# Patient Record
Sex: FEMALE | Race: WHITE | NOT HISPANIC OR LATINO | Employment: OTHER | URBAN - NONMETROPOLITAN AREA
[De-identification: names, ages, dates, MRNs, and addresses within clinical notes are randomized per-mention and may not be internally consistent; named-entity substitution may affect disease eponyms.]

---

## 2019-09-12 ENCOUNTER — CONVERSION ENCOUNTER (OUTPATIENT)
Dept: FAMILY MEDICINE CLINIC | Facility: CLINIC | Age: 64
End: 2019-09-12

## 2019-09-12 ENCOUNTER — OFFICE VISIT CONVERTED (OUTPATIENT)
Dept: FAMILY MEDICINE CLINIC | Facility: CLINIC | Age: 64
End: 2019-09-12
Attending: FAMILY MEDICINE

## 2019-09-24 ENCOUNTER — HOSPITAL ENCOUNTER (OUTPATIENT)
Dept: GENERAL RADIOLOGY | Facility: HOSPITAL | Age: 64
Discharge: HOME | End: 2019-09-24
Attending: FAMILY MEDICINE

## 2019-10-03 ENCOUNTER — CONVERSION ENCOUNTER (OUTPATIENT)
Dept: FAMILY MEDICINE CLINIC | Facility: CLINIC | Age: 64
End: 2019-10-03

## 2019-10-03 ENCOUNTER — HOSPITAL ENCOUNTER (OUTPATIENT)
Dept: GENERAL RADIOLOGY | Facility: HOSPITAL | Age: 64
Discharge: HOME | End: 2019-10-03
Attending: FAMILY MEDICINE

## 2019-10-03 ENCOUNTER — OFFICE VISIT CONVERTED (OUTPATIENT)
Dept: FAMILY MEDICINE CLINIC | Facility: CLINIC | Age: 64
End: 2019-10-03
Attending: FAMILY MEDICINE

## 2019-11-08 ENCOUNTER — OFFICE VISIT CONVERTED (OUTPATIENT)
Dept: FAMILY MEDICINE CLINIC | Facility: CLINIC | Age: 64
End: 2019-11-08
Attending: FAMILY MEDICINE

## 2019-11-11 ENCOUNTER — HOSPITAL ENCOUNTER (OUTPATIENT)
Dept: GENERAL RADIOLOGY | Facility: HOSPITAL | Age: 64
Discharge: HOME | End: 2019-11-11
Attending: FAMILY MEDICINE

## 2020-01-03 ENCOUNTER — OFFICE VISIT CONVERTED (OUTPATIENT)
Dept: FAMILY MEDICINE CLINIC | Facility: CLINIC | Age: 65
End: 2020-01-03
Attending: FAMILY MEDICINE

## 2020-01-03 ENCOUNTER — CONVERSION ENCOUNTER (OUTPATIENT)
Dept: FAMILY MEDICINE CLINIC | Facility: CLINIC | Age: 65
End: 2020-01-03

## 2020-04-27 ENCOUNTER — HOSPITAL ENCOUNTER (OUTPATIENT)
Dept: GENERAL RADIOLOGY | Facility: HOSPITAL | Age: 65
Discharge: HOME | End: 2020-04-27
Attending: FAMILY MEDICINE

## 2020-04-27 LAB
25(OH)D3 SERPL-MCNC: 13.5 NG/ML (ref 30–100)
ALBUMIN SERPL-MCNC: 4.2 G/DL (ref 3.5–5)
ALBUMIN/GLOB SERPL: 1.3 {RATIO} (ref 1.4–2.6)
ALP SERPL-CCNC: 72 U/L (ref 43–160)
ALT SERPL-CCNC: 17 U/L (ref 10–40)
ANION GAP SERPL CALC-SCNC: 22 MMOL/L (ref 8–19)
AST SERPL-CCNC: 25 U/L (ref 15–50)
BASOPHILS # BLD AUTO: 0.04 10*3/UL (ref 0–0.2)
BASOPHILS NFR BLD AUTO: 0.6 % (ref 0–3)
BILIRUB SERPL-MCNC: 0.95 MG/DL (ref 0.2–1.3)
BUN SERPL-MCNC: 19 MG/DL (ref 5–25)
BUN/CREAT SERPL: 22 {RATIO} (ref 6–20)
CALCIUM SERPL-MCNC: 9.3 MG/DL (ref 8.7–10.4)
CHLORIDE SERPL-SCNC: 104 MMOL/L (ref 99–111)
CHOLEST SERPL-MCNC: 170 MG/DL (ref 107–200)
CHOLEST/HDLC SERPL: 2.6 {RATIO} (ref 3–6)
CONV ABS IMM GRAN: 0.02 10*3/UL (ref 0–0.2)
CONV CO2: 20 MMOL/L (ref 22–32)
CONV IMMATURE GRAN: 0.3 % (ref 0–1.8)
CONV TOTAL PROTEIN: 7.4 G/DL (ref 6.3–8.2)
CREAT UR-MCNC: 0.88 MG/DL (ref 0.5–0.9)
DEPRECATED RDW RBC AUTO: 42.7 FL (ref 36.4–46.3)
EOSINOPHIL # BLD AUTO: 0.1 10*3/UL (ref 0–0.7)
EOSINOPHIL # BLD AUTO: 1.4 % (ref 0–7)
ERYTHROCYTE [DISTWIDTH] IN BLOOD BY AUTOMATED COUNT: 12.5 % (ref 11.7–14.4)
EST. AVERAGE GLUCOSE BLD GHB EST-MCNC: 114 MG/DL
GFR SERPLBLD BASED ON 1.73 SQ M-ARVRAT: >60 ML/MIN/{1.73_M2}
GLOBULIN UR ELPH-MCNC: 3.2 G/DL (ref 2–3.5)
GLUCOSE SERPL-MCNC: 104 MG/DL (ref 65–99)
HBA1C MFR BLD: 5.6 % (ref 3.5–5.7)
HCT VFR BLD AUTO: 47 % (ref 37–47)
HDLC SERPL-MCNC: 66 MG/DL (ref 40–60)
HGB BLD-MCNC: 15.5 G/DL (ref 12–16)
LDLC SERPL CALC-MCNC: 83 MG/DL (ref 70–100)
LYMPHOCYTES # BLD AUTO: 1.53 10*3/UL (ref 1–5)
LYMPHOCYTES NFR BLD AUTO: 21.9 % (ref 20–45)
MCH RBC QN AUTO: 30.3 PG (ref 27–31)
MCHC RBC AUTO-ENTMCNC: 33 G/DL (ref 33–37)
MCV RBC AUTO: 92 FL (ref 81–99)
MONOCYTES # BLD AUTO: 0.36 10*3/UL (ref 0.2–1.2)
MONOCYTES NFR BLD AUTO: 5.2 % (ref 3–10)
NEUTROPHILS # BLD AUTO: 4.93 10*3/UL (ref 2–8)
NEUTROPHILS NFR BLD AUTO: 70.6 % (ref 30–85)
NRBC CBCN: 0 % (ref 0–0.7)
OSMOLALITY SERPL CALC.SUM OF ELEC: 295 MOSM/KG (ref 273–304)
PLATELET # BLD AUTO: 201 10*3/UL (ref 130–400)
PMV BLD AUTO: 11.2 FL (ref 9.4–12.3)
POTASSIUM SERPL-SCNC: 4.6 MMOL/L (ref 3.5–5.3)
RBC # BLD AUTO: 5.11 10*6/UL (ref 4.2–5.4)
SODIUM SERPL-SCNC: 141 MMOL/L (ref 135–147)
TRIGL SERPL-MCNC: 104 MG/DL (ref 40–150)
TSH SERPL-ACNC: 1.85 M[IU]/L (ref 0.27–4.2)
VLDLC SERPL-MCNC: 21 MG/DL (ref 5–37)
WBC # BLD AUTO: 6.98 10*3/UL (ref 4.8–10.8)

## 2020-04-29 LAB
CCP IGA+IGG SERPL IA-ACNC: 10 UNITS (ref 0–19)
DSDNA AB SER-ACNC: NEGATIVE [IU]/ML
ENA AB SER IA-ACNC: NEGATIVE {RATIO}

## 2020-04-30 LAB — ENA JO1 IGG SER-ACNC: 0 AU/ML (ref 0–40)

## 2020-05-05 ENCOUNTER — TELEMEDICINE CONVERTED (OUTPATIENT)
Dept: FAMILY MEDICINE CLINIC | Facility: CLINIC | Age: 65
End: 2020-05-05
Attending: FAMILY MEDICINE

## 2021-05-13 NOTE — PROGRESS NOTES
Quick Note      Patient Name: Suyapa Barrera   Patient ID: [id number]   Sex: Female   YOB: 1955    Primary Care Provider: Robin Cavazos DO   Referring Provider: Robin Cavazos DO    Visit Date: May 5, 2020    Provider: Robin Cavazos DO   Location: [facility]   Location Address: [address] [place] [address] [address] [place] [address]  [id number]   Location Phone: [phone]          History Of Present Illness  TELEHEALTH TELEPHONE VISIT  Chief Complaint: FOLLOW UP   Suyapa Barrera is a 65 year old /White female who is presenting for evaluation via telehealth telephone visit. Verbal consent obtained before beginning visit.   Provider spent 12 minutes with the patient during telehealth visit.   The following staff were present during this visit: Robin Cavazos DO   Past Medical History/Overview of Patient Symptoms       Patient with PMH significant for anxiety depression HTN and prediabetes.  She is on aspirin Effexor lisinopril metformin simvastatin last labs were 4/2028 was 13.4 A1c 5.6 CMP cholesterol within normal limits and she had some rheumatology labs are were negative.  Otherwise doing well no complaints today needs refills    Complaining of some mild elbow pain sounds like arthritis or bursitis she stateS    **Attempted video several times           Assessment  · Osteoarthritis     715.90/M19.90  · Anxiety and depression       Anxiety disorder, unspecified     300.00/F41.9  Major depressive disorder, single episode, unspecified     300.00/F32.9  · HLD (hyperlipidemia)     272.4/E78.5  · HTN (hypertension)     401.9/I10  · Prediabetes     790.29/R73.03  · Obesity (BMI 30-39.9)     278.00/E66.9           Depression  *Controlled on meds continue with Effexor    Prediabetes  *Controlled  A1c recently 5.6  Continue with diet exercise    HTN HLD  *Continue lisinopril and simvastatin    Recheck labs annually follow-up 6 to 12 months   RX voltaren gel for elbow pain        Plan  · Medications  o Voltaren 1 % topical gel   SIG: apply 2 grams to the affected area(s) by topical route 4 times per day for 14 days   DISP: (1) 100 gm tube with 5 refills  Prescribed on 05/05/2020     o Medications have been Reconciled  · Instructions  o Plan Of Care:   o Chronic conditions reviewed and taken into consideration for today's treatment plan.  o Patient instructed to seek medical attention urgently for new or worsening symptoms.  o Call the office with any concerns or questions.  o Risks, benefits, and alternatives were discussed with the patient. The patient is aware of risks associated with:  · Disposition  o Call or Return if symptoms worsen or persist.  o Appointment Requested ([id number]) and Recall created ([id number])  Careprovider : Robin Cavazos DO ([id number])  Location : [facility]  Appointment : Follow-up / Office Visit  Date : 6 months +/- 2 days  Override : No  Comments/Instructions : f/u chronic conditions            Electronically Signed by: Robin Cavazos DO -Author on May 5, 2020 09:29:21 AM

## 2021-05-15 VITALS
HEART RATE: 85 BPM | BODY MASS INDEX: 34.85 KG/M2 | DIASTOLIC BLOOD PRESSURE: 74 MMHG | TEMPERATURE: 97.8 F | RESPIRATION RATE: 20 BRPM | WEIGHT: 204.12 LBS | SYSTOLIC BLOOD PRESSURE: 136 MMHG | OXYGEN SATURATION: 98 % | HEIGHT: 64 IN

## 2021-05-15 VITALS
BODY MASS INDEX: 34.55 KG/M2 | OXYGEN SATURATION: 99 % | DIASTOLIC BLOOD PRESSURE: 75 MMHG | WEIGHT: 202.37 LBS | HEIGHT: 64 IN | HEART RATE: 72 BPM | RESPIRATION RATE: 20 BRPM | TEMPERATURE: 97.3 F | SYSTOLIC BLOOD PRESSURE: 149 MMHG

## 2021-05-15 VITALS
BODY MASS INDEX: 34.21 KG/M2 | WEIGHT: 200.37 LBS | SYSTOLIC BLOOD PRESSURE: 140 MMHG | OXYGEN SATURATION: 96 % | DIASTOLIC BLOOD PRESSURE: 75 MMHG | HEART RATE: 91 BPM | HEIGHT: 64 IN | RESPIRATION RATE: 18 BRPM

## 2021-05-15 VITALS
SYSTOLIC BLOOD PRESSURE: 136 MMHG | HEART RATE: 71 BPM | TEMPERATURE: 97.7 F | DIASTOLIC BLOOD PRESSURE: 70 MMHG | BODY MASS INDEX: 34.51 KG/M2 | HEIGHT: 64 IN | OXYGEN SATURATION: 98 % | WEIGHT: 202.12 LBS | RESPIRATION RATE: 20 BRPM

## 2022-08-03 ENCOUNTER — TELEPHONE (OUTPATIENT)
Dept: FAMILY MEDICINE CLINIC | Facility: CLINIC | Age: 67
End: 2022-08-03

## 2022-08-03 DIAGNOSIS — U07.1 COVID-19 VIRUS DETECTED: Primary | ICD-10-CM

## 2022-08-03 PROBLEM — R73.02 IMPAIRED GLUCOSE TOLERANCE: Status: ACTIVE | Noted: 2022-08-03

## 2022-08-03 PROBLEM — G89.29 CHRONIC PAIN: Status: ACTIVE | Noted: 2022-08-03

## 2022-08-03 PROBLEM — G47.33 OSA (OBSTRUCTIVE SLEEP APNEA): Status: ACTIVE | Noted: 2022-08-03

## 2022-08-03 PROBLEM — E55.9 VITAMIN D DEFICIENCY: Status: ACTIVE | Noted: 2022-08-03

## 2022-08-03 PROBLEM — E78.5 HLD (HYPERLIPIDEMIA): Status: ACTIVE | Noted: 2022-08-03

## 2022-08-03 PROBLEM — E66.9 OBESITY (BMI 30-39.9): Status: ACTIVE | Noted: 2022-08-03

## 2022-08-03 PROBLEM — F32.A ANXIETY AND DEPRESSION: Status: ACTIVE | Noted: 2022-08-03

## 2022-08-03 PROBLEM — F41.0 PANIC ATTACKS: Status: ACTIVE | Noted: 2022-08-03

## 2022-08-03 PROBLEM — M51.369 DEGENERATIVE DISC DISEASE, LUMBAR: Status: ACTIVE | Noted: 2022-08-03

## 2022-08-03 PROBLEM — M51.36 DEGENERATIVE DISC DISEASE, LUMBAR: Status: ACTIVE | Noted: 2022-08-03

## 2022-08-03 PROBLEM — K21.9 ESOPHAGEAL REFLUX: Status: ACTIVE | Noted: 2022-08-03

## 2022-08-03 PROBLEM — I10 HTN (HYPERTENSION): Status: ACTIVE | Noted: 2022-08-03

## 2022-08-03 PROBLEM — F41.9 ANXIETY AND DEPRESSION: Status: ACTIVE | Noted: 2022-08-03

## 2022-08-03 RX ORDER — OMEPRAZOLE 40 MG/1
40 CAPSULE, DELAYED RELEASE ORAL DAILY
COMMUNITY
Start: 2022-06-09

## 2022-08-03 RX ORDER — LISINOPRIL 20 MG/1
TABLET ORAL
COMMUNITY
Start: 2022-06-09 | End: 2022-08-04

## 2022-08-03 RX ORDER — VENLAFAXINE HYDROCHLORIDE 75 MG/1
75 CAPSULE, EXTENDED RELEASE ORAL DAILY
COMMUNITY
Start: 2022-06-09

## 2022-08-03 RX ORDER — ASPIRIN 81 MG/1
TABLET, CHEWABLE ORAL
COMMUNITY
End: 2022-08-04

## 2022-08-03 RX ORDER — SIMVASTATIN 40 MG
40 TABLET ORAL NIGHTLY
COMMUNITY
Start: 2022-06-25

## 2022-08-03 NOTE — TELEPHONE ENCOUNTER
Caller: Suyapa Barrera    Relationship: Self    Best call back number: [phone]    What medication are you requesting: PAXLOVID     What are your current symptoms: COVID POSITIVE HOME TEST, EYES BURN, DRY COUGH, VOICE HOARSE    How long have you been experiencing symptoms: 08/02/2022    Have you had these symptoms before:    [] Yes  [x] No    Have you been treated for these symptoms before:   [] Yes  [x] No    If a prescription is needed, what is your preferred pharmacy and phone number: [id number] [address] AT [facility] [address]  [phone] [facility] [phone] FX

## 2022-08-03 NOTE — TELEPHONE ENCOUNTER
Talked to patient, she has not been seen in office since 2020. I advised to do a telehealth since she tested positive for covid at home and she does not want to do that and she does not want to go to urgent care or er as well. She was just asking for medication.

## 2022-08-03 NOTE — TELEPHONE ENCOUNTER
Ok I sent in med to j[facility] and messaged her on [employer] and [employer] that I had sent it.     Will see her tomorrow

## 2022-08-04 ENCOUNTER — OFFICE VISIT (OUTPATIENT)
Dept: FAMILY MEDICINE CLINIC | Facility: CLINIC | Age: 67
End: 2022-08-04

## 2022-08-04 VITALS
DIASTOLIC BLOOD PRESSURE: 73 MMHG | HEIGHT: 64 IN | SYSTOLIC BLOOD PRESSURE: 134 MMHG | OXYGEN SATURATION: 97 % | RESPIRATION RATE: 18 BRPM | TEMPERATURE: 97.8 F | WEIGHT: 198.5 LBS | HEART RATE: 85 BPM | BODY MASS INDEX: 33.89 KG/M2

## 2022-08-04 DIAGNOSIS — S93.609A COMPLETE TEAR OF LIGAMENT OF FOOT: ICD-10-CM

## 2022-08-04 DIAGNOSIS — G89.29 OTHER CHRONIC PAIN: ICD-10-CM

## 2022-08-04 DIAGNOSIS — F41.9 ANXIETY AND DEPRESSION: ICD-10-CM

## 2022-08-04 DIAGNOSIS — Z78.0 POSTMENOPAUSE: ICD-10-CM

## 2022-08-04 DIAGNOSIS — Z12.11 ENCOUNTER FOR SCREENING FOR MALIGNANT NEOPLASM OF COLON: ICD-10-CM

## 2022-08-04 DIAGNOSIS — F32.A ANXIETY AND DEPRESSION: ICD-10-CM

## 2022-08-04 DIAGNOSIS — Z12.31 ENCOUNTER FOR SCREENING MAMMOGRAM FOR MALIGNANT NEOPLASM OF BREAST: ICD-10-CM

## 2022-08-04 DIAGNOSIS — U07.1 COVID-19 VIRUS DETECTED: Primary | ICD-10-CM

## 2022-08-04 PROBLEM — M25.519 SHOULDER JOINT PAIN: Status: ACTIVE | Noted: 2022-08-04

## 2022-08-04 PROBLEM — M25.619 STIFFNESS OF SHOULDER JOINT: Status: ACTIVE | Noted: 2022-08-04

## 2022-08-04 PROBLEM — M75.20 BICIPITAL TENOSYNOVITIS: Status: ACTIVE | Noted: 2022-08-04

## 2022-08-04 PROBLEM — M71.9 DISORDER OF BURSAE OF SHOULDER REGION: Status: ACTIVE | Noted: 2022-08-04

## 2022-08-04 PROBLEM — M75.120 FULL THICKNESS ROTATOR CUFF TEAR: Status: ACTIVE | Noted: 2022-08-04

## 2022-08-04 PROCEDURE — 99213 OFFICE O/P EST LOW 20 MIN: CPT | Performed by: FAMILY MEDICINE

## 2022-08-04 RX ORDER — DICLOFENAC SODIUM 75 MG/1
TABLET, DELAYED RELEASE ORAL
COMMUNITY
End: 2022-08-04

## 2022-08-04 RX ORDER — ERGOCALCIFEROL 1.25 MG/1
CAPSULE ORAL
COMMUNITY
End: 2022-08-04

## 2022-08-04 RX ORDER — HYDROCODONE BITARTRATE AND ACETAMINOPHEN 5; 325 MG/1; MG/1
TABLET ORAL
COMMUNITY
End: 2022-08-04

## 2022-08-04 RX ORDER — TRAMADOL HYDROCHLORIDE 50 MG/1
TABLET ORAL
COMMUNITY
End: 2022-08-04

## 2022-08-04 RX ORDER — LISINOPRIL 30 MG/1
30 TABLET ORAL DAILY
COMMUNITY

## 2022-08-04 NOTE — PROGRESS NOTES
"Chief Complaint  Generalized Body Aches (Symptoms started Tuesday), Cough (At home Covid test positive), Sore Throat, and Fever    Subjective        Suyapa Barrera presents to [facility]  History of Present Illness    Patient presents with body aches sore throat fever symptoms no improvement with over-the-counter medicines    Objective   Vital Signs:  /73 (BP Location: Left arm, Patient Position: Sitting)   Pulse 85   Temp 97.8 °F (36.6 °C) (Infrared)   Resp 18   Ht 162.6 cm (64\")   Wt 90 kg (198 lb 8 oz)   SpO2 97%   BMI 34.07 kg/m²   Estimated body mass index is 34.07 kg/m² as calculated from the following:    Height as of this encounter: 162.6 cm (64\").    Weight as of this encounter: 90 kg (198 lb 8 oz).          Physical Exam  Vitals reviewed.   Constitutional:       Appearance: Normal appearance. She is well-developed.   HENT:      Head: Normocephalic and atraumatic.      Right Ear: External ear normal.      Left Ear: External ear normal.      Nose: Congestion and rhinorrhea present.   Eyes:      Conjunctiva/sclera: Conjunctivae normal.      Pupils: Pupils are equal, round, and reactive to light.   Cardiovascular:      Rate and Rhythm: Normal rate.   Pulmonary:      Effort: Pulmonary effort is normal.      Breath sounds: Normal breath sounds.   Abdominal:      General: There is no distension.   Skin:     General: Skin is warm and dry.   Neurological:      General: No focal deficit present.      Mental Status: She is alert and oriented to person, place, and time.   Psychiatric:         Mood and Affect: Mood and affect normal.         Behavior: Behavior normal.         Thought Content: Thought content normal.         Judgment: Judgment normal.        Result Review :  The following data was reviewed by: Robin Cavazos DO on 08/04/2022:                Assessment and Plan   Diagnoses and all orders for this visit:    1. COVID-19 virus detected (Primary)  -     " Discontinue: Nirmatrelvir & Ritonavir (PAXLOVID) 20 x 150 MG & 10 x 100MG tablet therapy pack tablet; Take 3 tablets by mouth 2 (Two) Times a Day for 5 days.  Dispense: 30 tablet; Refill: 0  -     Nirmatrelvir & Ritonavir (PAXLOVID) 20 x 150 MG & 10 x 100MG tablet therapy pack tablet; Take 3 tablets by mouth 2 (Two) Times a Day for 5 days.  Dispense: 30 tablet; Refill: 0  -     Lipid panel; Future  -     CBC (No Diff); Future  -     Comprehensive metabolic panel; Future    2. Postmenopause  -     DEXA Bone Density Axial; Future  -     Lipid panel; Future  -     CBC (No Diff); Future  -     Comprehensive metabolic panel; Future    3. Encounter for screening mammogram for malignant neoplasm of breast  -     Mammo Screening Digital Tomosynthesis Bilateral With CAD; Future  -     Lipid panel; Future  -     CBC (No Diff); Future  -     Comprehensive metabolic panel; Future    4. Encounter for screening for malignant neoplasm of colon  -     Ambulatory Referral For Screening Colonoscopy  -     Lipid panel; Future  -     CBC (No Diff); Future  -     Comprehensive metabolic panel; Future    5. Anxiety and depression  -     Lipid panel; Future  -     CBC (No Diff); Future  -     Comprehensive metabolic panel; Future    6. Other chronic pain  -     Lipid panel; Future  -     CBC (No Diff); Future  -     Comprehensive metabolic panel; Future    7. Complete tear of ligament of foot  -     Ambulatory Referral to Podiatry    With podiatry for foot pain labs will be done prior to next appointment medicines otherwise for chronic    Prescribe medicine to treat       Follow Up   Return if symptoms worsen or fail to improve, for Next scheduled follow up, Physical, Labs before.  Patient was given instructions and counseling regarding her condition or for health maintenance advice. Please see specific information pulled into the AVS if appropriate.

## 2022-08-23 ENCOUNTER — TRANSCRIBE ORDERS (OUTPATIENT)
Dept: ADMINISTRATIVE | Facility: HOSPITAL | Age: 67
End: 2022-08-23

## 2022-08-23 DIAGNOSIS — S93.401A SPRAIN OF RIGHT ANKLE, INITIAL ENCOUNTER: Primary | ICD-10-CM

## 2022-08-30 ENCOUNTER — HOSPITAL ENCOUNTER (OUTPATIENT)
Dept: BONE DENSITY | Facility: HOSPITAL | Age: 67
Discharge: HOME | End: 2022-08-30

## 2022-08-30 ENCOUNTER — HOSPITAL ENCOUNTER (OUTPATIENT)
Dept: MAMMOGRAPHY | Facility: HOSPITAL | Age: 67
Discharge: HOME | End: 2022-08-30

## 2022-08-30 DIAGNOSIS — Z78.0 POSTMENOPAUSE: ICD-10-CM

## 2022-08-30 DIAGNOSIS — Z12.31 ENCOUNTER FOR SCREENING MAMMOGRAM FOR MALIGNANT NEOPLASM OF BREAST: ICD-10-CM

## 2022-08-30 PROCEDURE — 77080 DXA BONE DENSITY AXIAL: CPT

## 2022-09-02 ENCOUNTER — LAB (OUTPATIENT)
Dept: LAB | Facility: HOSPITAL | Age: 67
End: 2022-09-02

## 2022-09-02 ENCOUNTER — TRANSCRIBE ORDERS (OUTPATIENT)
Dept: ADMINISTRATIVE | Facility: HOSPITAL | Age: 67
End: 2022-09-02

## 2022-09-02 DIAGNOSIS — U07.1 COVID-19 VIRUS DETECTED: ICD-10-CM

## 2022-09-02 DIAGNOSIS — R92.8 ABNORMAL MAMMOGRAM: Primary | ICD-10-CM

## 2022-09-02 DIAGNOSIS — Z12.31 ENCOUNTER FOR SCREENING MAMMOGRAM FOR MALIGNANT NEOPLASM OF BREAST: ICD-10-CM

## 2022-09-02 DIAGNOSIS — G89.29 OTHER CHRONIC PAIN: ICD-10-CM

## 2022-09-02 DIAGNOSIS — Z12.11 ENCOUNTER FOR SCREENING FOR MALIGNANT NEOPLASM OF COLON: ICD-10-CM

## 2022-09-02 DIAGNOSIS — F32.A ANXIETY AND DEPRESSION: ICD-10-CM

## 2022-09-02 DIAGNOSIS — Z78.0 POSTMENOPAUSE: ICD-10-CM

## 2022-09-02 DIAGNOSIS — F41.9 ANXIETY AND DEPRESSION: ICD-10-CM

## 2022-09-02 LAB
ALBUMIN SERPL-MCNC: 4 G/DL (ref 3.5–5.2)
ALBUMIN/GLOB SERPL: 1.6 G/DL
ALP SERPL-CCNC: 84 U/L (ref 39–117)
ALT SERPL W P-5'-P-CCNC: 12 U/L (ref 1–33)
ANION GAP SERPL CALCULATED.3IONS-SCNC: 9 MMOL/L (ref 5–15)
AST SERPL-CCNC: 21 U/L (ref 1–32)
BILIRUB SERPL-MCNC: 0.8 MG/DL (ref 0–1.2)
BUN SERPL-MCNC: 11 MG/DL (ref 8–23)
BUN/CREAT SERPL: 11.8 (ref 7–25)
CALCIUM SPEC-SCNC: 9.1 MG/DL (ref 8.6–10.5)
CHLORIDE SERPL-SCNC: 105 MMOL/L (ref 98–107)
CHOLEST SERPL-MCNC: 172 MG/DL (ref 0–200)
CO2 SERPL-SCNC: 25 MMOL/L (ref 22–29)
CREAT SERPL-MCNC: 0.93 MG/DL (ref 0.57–1)
DEPRECATED RDW RBC AUTO: 40 FL (ref 37–54)
EGFRCR SERPLBLD CKD-EPI 2021: 68.3 ML/MIN/1.73
ERYTHROCYTE [DISTWIDTH] IN BLOOD BY AUTOMATED COUNT: 12.5 % (ref 12.3–15.4)
GLOBULIN UR ELPH-MCNC: 2.5 GM/DL
GLUCOSE SERPL-MCNC: 97 MG/DL (ref 65–99)
HCT VFR BLD AUTO: 43.9 % (ref 34–46.6)
HDLC SERPL-MCNC: 54 MG/DL (ref 40–60)
HGB BLD-MCNC: 15.1 G/DL (ref 12–15.9)
LDLC SERPL CALC-MCNC: 102 MG/DL (ref 0–100)
LDLC/HDLC SERPL: 1.87 {RATIO}
MCH RBC QN AUTO: 30.6 PG (ref 26.6–33)
MCHC RBC AUTO-ENTMCNC: 34.4 G/DL (ref 31.5–35.7)
MCV RBC AUTO: 88.9 FL (ref 79–97)
PLATELET # BLD AUTO: 222 10*3/MM3 (ref 140–450)
PMV BLD AUTO: 10.3 FL (ref 6–12)
POTASSIUM SERPL-SCNC: 4.2 MMOL/L (ref 3.5–5.2)
PROT SERPL-MCNC: 6.5 G/DL (ref 6–8.5)
RBC # BLD AUTO: 4.94 10*6/MM3 (ref 3.77–5.28)
SODIUM SERPL-SCNC: 139 MMOL/L (ref 136–145)
TRIGL SERPL-MCNC: 85 MG/DL (ref 0–150)
VLDLC SERPL-MCNC: 16 MG/DL (ref 5–40)
WBC NRBC COR # BLD: 6.05 10*3/MM3 (ref 3.4–10.8)

## 2022-09-02 PROCEDURE — 85027 COMPLETE CBC AUTOMATED: CPT

## 2022-09-02 PROCEDURE — 80053 COMPREHEN METABOLIC PANEL: CPT

## 2022-09-02 PROCEDURE — 36415 COLL VENOUS BLD VENIPUNCTURE: CPT

## 2022-09-02 PROCEDURE — 80061 LIPID PANEL: CPT

## 2022-09-14 ENCOUNTER — HOSPITAL ENCOUNTER (OUTPATIENT)
Dept: MRI IMAGING | Facility: HOSPITAL | Age: 67
Discharge: HOME | End: 2022-09-14
Admitting: PODIATRIST

## 2022-09-14 DIAGNOSIS — S93.401A SPRAIN OF RIGHT ANKLE, INITIAL ENCOUNTER: ICD-10-CM

## 2022-09-14 PROCEDURE — 73721 MRI JNT OF LWR EXTRE W/O DYE: CPT

## 2022-09-15 ENCOUNTER — TELEPHONE (OUTPATIENT)
Dept: FAMILY MEDICINE CLINIC | Facility: CLINIC | Age: 67
End: 2022-09-15

## 2022-09-15 DIAGNOSIS — R92.8 ABNORMAL MAMMOGRAM: Primary | ICD-10-CM

## 2022-09-15 NOTE — TELEPHONE ENCOUNTER
SCHEDULING CALLED AND THEY NEEDED BOTH OF THERE ORDERS PUT IN AND SIGNED BY YOU PATIENT HAS AN APPOINTMENT FOR THEM TODAY PENDED ORDERS TO YOU

## 2022-09-20 ENCOUNTER — HOSPITAL ENCOUNTER (OUTPATIENT)
Dept: MAMMOGRAPHY | Facility: HOSPITAL | Age: 67
Discharge: HOME | End: 2022-09-20

## 2022-09-20 ENCOUNTER — HOSPITAL ENCOUNTER (OUTPATIENT)
Dept: ULTRASOUND IMAGING | Facility: HOSPITAL | Age: 67
Discharge: HOME | End: 2022-09-20

## 2022-09-20 DIAGNOSIS — R92.8 ABNORMAL MAMMOGRAM: ICD-10-CM

## 2022-09-20 PROCEDURE — 77065 DX MAMMO INCL CAD UNI: CPT

## 2022-09-20 PROCEDURE — 76642 ULTRASOUND BREAST LIMITED: CPT

## 2022-09-20 PROCEDURE — G0279 TOMOSYNTHESIS, MAMMO: HCPCS

## 2022-11-01 ENCOUNTER — OFFICE VISIT (OUTPATIENT)
Dept: FAMILY MEDICINE CLINIC | Facility: CLINIC | Age: 67
End: 2022-11-01

## 2022-11-01 VITALS
WEIGHT: 203.3 LBS | HEIGHT: 64 IN | RESPIRATION RATE: 16 BRPM | TEMPERATURE: 98.1 F | OXYGEN SATURATION: 100 % | BODY MASS INDEX: 34.71 KG/M2 | HEART RATE: 76 BPM | SYSTOLIC BLOOD PRESSURE: 117 MMHG | DIASTOLIC BLOOD PRESSURE: 71 MMHG

## 2022-11-01 DIAGNOSIS — E66.09 CLASS 1 OBESITY DUE TO EXCESS CALORIES WITHOUT SERIOUS COMORBIDITY WITH BODY MASS INDEX (BMI) OF 34.0 TO 34.9 IN ADULT: Chronic | ICD-10-CM

## 2022-11-01 DIAGNOSIS — E78.2 MIXED HYPERLIPIDEMIA: Chronic | ICD-10-CM

## 2022-11-01 DIAGNOSIS — I10 PRIMARY HYPERTENSION: Chronic | ICD-10-CM

## 2022-11-01 DIAGNOSIS — Z00.00 ENCOUNTER FOR SUBSEQUENT ANNUAL WELLNESS VISIT (AWV) IN MEDICARE PATIENT: Primary | ICD-10-CM

## 2022-11-01 DIAGNOSIS — G56.02 CARPAL TUNNEL SYNDROME OF LEFT WRIST: ICD-10-CM

## 2022-11-01 DIAGNOSIS — F41.9 ANXIETY AND DEPRESSION: Chronic | ICD-10-CM

## 2022-11-01 DIAGNOSIS — F32.A ANXIETY AND DEPRESSION: Chronic | ICD-10-CM

## 2022-11-01 DIAGNOSIS — G47.33 OSA (OBSTRUCTIVE SLEEP APNEA): Chronic | ICD-10-CM

## 2022-11-01 PROBLEM — E78.5 HLD (HYPERLIPIDEMIA): Chronic | Status: ACTIVE | Noted: 2022-08-03

## 2022-11-01 PROCEDURE — 1170F FXNL STATUS ASSESSED: CPT | Performed by: FAMILY MEDICINE

## 2022-11-01 PROCEDURE — 1125F AMNT PAIN NOTED PAIN PRSNT: CPT | Performed by: FAMILY MEDICINE

## 2022-11-01 PROCEDURE — 99214 OFFICE O/P EST MOD 30 MIN: CPT | Performed by: FAMILY MEDICINE

## 2022-11-01 PROCEDURE — G0402 INITIAL PREVENTIVE EXAM: HCPCS | Performed by: FAMILY MEDICINE

## 2022-11-01 PROCEDURE — 1159F MED LIST DOCD IN RCRD: CPT | Performed by: FAMILY MEDICINE

## 2022-11-01 RX ORDER — PRAZOSIN HYDROCHLORIDE 1 MG/1
1 CAPSULE ORAL NIGHTLY
Qty: 30 CAPSULE | Refills: 2 | Status: SHIPPED | OUTPATIENT
Start: 2022-11-01 | End: 2022-12-08 | Stop reason: SDUPTHER

## 2022-11-01 NOTE — PROGRESS NOTES
The ABCs of the Annual Wellness Visit  Subsequent Medicare Wellness Visit    Chief Complaint   Patient presents with   • Wrist Pain     Left wrist. Think its CTS    • Medicare Wellness-subsequent      Subjective    History of Present Illness:  Suyapa Barrera is a 67 y.o. female who presents for a Subsequent Medicare Wellness Visit.    The patient is a 67-year-old female who presents to the clinic today for left wrist pain. A sleep study is recommended as she has a history of snoring and apnea. A sleep study is likely to be ordered today. She will follow up after results and consider a sleep medicine referral if indicated. Her blood pressure is 117/71 mmHg. Her weight is 203 pounds with a BMI of 34.9 and stable. The patient takes metformin for prediabetes, lisinopril for blood pressure, simvastatin for hyperlipidemia, and Effexor for chronic anxiety and depression which is stable and controlled.    The patient reports being compliant with her recommended left wrist treatment, but her symptoms are not improving. She is wearing a wrist splint with a thumb at night and during the day. She states she is experiencing a sensation of numbness and burning along with weakness when attempting to grab an item, even a coffee cup. The patient states the area was mildly swollen at her last visit. She denies any previous injury or x-ray.    The patient requests a sleep study as she states she has sleep apnea and uses a CPAP machine, but it is not working. She notes she takes the CPAP mask off at night while sleeping. She reports she falls asleep during the day and is irritable. The patient states she has developed night terrors within the last year resulting in screaming in her sleep. Although, she is not aware this is happening unless other staying with her relay this to her. She denies feeling paralyzed while sleeping. The patient notes she would like an updated sleep study so that she may inquire about the Inspire implant. She  "denies wanting a home study. The patient states her sleep apnea is interfering with her life.      The patient states she does not want Paxlovid if she contracts COVID-19 again as it resulted in an extremely dry mouth for 5 days and it did not provide relief.     The patient states doctor Michael ordered an MRI scan which revealed no injury. He believed physical therapy would resolve her symptoms. Therefore, she recently completed 6 weeks of physical therapy for her foot, but this did not provide relief aside from mild strengthening of her ankle. She states she was told she has significant arthritis in her foot which may be a contributing factor. The patient notes she has fluctuating good and bad days with her foot, but her pain is primarily worse with ambulation. She states she will continue her physical therapy exercises at home. She confirms having \"special\" orthotics; however, they do not appear to work. The patient notes iliotibial band issues that she describes as a sensation of fluid draining down to her knee. She notes she stands against the wall and stretches her lower extremities and adds that her symptoms are improved for 2 days, but there are days when she is sitting still and experiences a burning sensation.    The following portions of the patient's history were reviewed and   updated as appropriate: allergies, current medications, past family history, past medical history, past social history, past surgical history and problem list.    Compared to one year ago, the patient feels her physical   health is the same.    Compared to one year ago, the patient feels her mental   health is the same.    Recent Hospitalizations:  She was not admitted to the hospital during the last year.       Current Medical Providers:  Patient Care Team:  Robin Cavazos DO as PCP - General (Family Medicine)    Outpatient Medications Prior to Visit   Medication Sig Dispense Refill   • lisinopril (PRINIVIL,ZESTRIL) 30 MG tablet " "Take 30 mg by mouth Daily.     • metFORMIN (GLUCOPHAGE) 500 MG tablet Take 500 mg by mouth Daily With Breakfast.     • omeprazole (priLOSEC) 40 MG capsule Take 40 mg by mouth Daily.     • simvastatin (ZOCOR) 40 MG tablet Take 40 mg by mouth Every Night.     • venlafaxine XR (EFFEXOR-XR) 75 MG 24 hr capsule Take 75 mg by mouth Daily.       No facility-administered medications prior to visit.       No opioid medication identified on active medication list. I have reviewed chart for other potential  high risk medication/s and harmful drug interactions in the elderly.          Aspirin is not on active medication list.  Aspirin use is indicated based on review of current medical condition/s. Pros and cons of this therapy have been discussed with this patient. Benefits of this medication outweigh potential harm.  Patient has been instructed to start taking this medication..    Patient Active Problem List   Diagnosis   • Anxiety and depression   • Chronic pain   • Degenerative disc disease, lumbar   • Esophageal reflux   • HLD (hyperlipidemia)   • HTN (hypertension)   • Impaired glucose tolerance   • Class 1 obesity due to excess calories without serious comorbidity with body mass index (BMI) of 34.0 to 34.9 in adult   • BURKE (obstructive sleep apnea)   • Panic attacks   • Vitamin D deficiency   • Bicipital tenosynovitis   • Full thickness rotator cuff tear   • Carpal tunnel syndrome of left wrist     Advance Care Planning  Advance Directive is not on file.  ACP discussion was declined by the patient. Patient has an advance directive (not in EMR), copy requested.          Objective    Vitals:    11/01/22 1533 11/01/22 1535   BP: 143/72 117/71   Pulse: 76    Resp: 16    Temp: 98.1 °F (36.7 °C)    SpO2: 100%    Weight: 92.2 kg (203 lb 4.8 oz)    Height: 162.6 cm (64\")    PainSc:   4      Estimated body mass index is 34.9 kg/m² as calculated from the following:    Height as of this encounter: 162.6 cm (64\").    Weight as of " this encounter: 92.2 kg (203 lb 4.8 oz).    BMI is >= 30 and <35. (Class 1 Obesity). The following options were offered after discussion;: weight loss educational material (shared in after visit summary) and exercise counseling/recommendations      Does the patient have evidence of cognitive impairment? No    Physical Exam  Vitals reviewed.   Constitutional:       Appearance: Normal appearance. She is well-developed.   HENT:      Head: Normocephalic and atraumatic.      Right Ear: External ear normal.      Left Ear: External ear normal.      Nose: Nose normal.   Eyes:      Conjunctiva/sclera: Conjunctivae normal.      Pupils: Pupils are equal, round, and reactive to light.   Cardiovascular:      Rate and Rhythm: Normal rate.   Pulmonary:      Effort: Pulmonary effort is normal.      Breath sounds: Normal breath sounds.   Abdominal:      General: There is no distension.   Skin:     General: Skin is warm and dry.   Neurological:      General: No focal deficit present.      Mental Status: She is alert and oriented to person, place, and time.   Psychiatric:         Mood and Affect: Mood and affect normal.         Behavior: Behavior normal.         Thought Content: Thought content normal.         Judgment: Judgment normal.       Lab Results   Component Value Date    TRIG 85 09/02/2022    HDL 54 09/02/2022     (H) 09/02/2022    VLDL 16 09/02/2022            HEALTH RISK ASSESSMENT    Smoking Status:  Social History     Tobacco Use   Smoking Status Never   Smokeless Tobacco Never     Alcohol Consumption:  Social History     Substance and Sexual Activity   Alcohol Use Yes   • Alcohol/week: 1.0 standard drink   • Types: 1 Glasses of wine per week     Fall Risk Screen:    STEADI Fall Risk Assessment was completed, and patient is at LOW risk for falls.Assessment completed on:11/1/2022    Depression Screening:  PHQ-2/PHQ-9 Depression Screening 11/1/2022   Little Interest or Pleasure in Doing Things 1-->several days    Feeling Down, Depressed or Hopeless 1-->several days   Trouble Falling or Staying Asleep, or Sleeping Too Much 3-->nearly every day   Feeling Tired or Having Little Energy 1-->several days   Poor Appetite or Overeating 1-->several days   Feeling Bad about Yourself - or that You are a Failure or Have Let Yourself or Your Family Down 0-->not at all   Trouble Concentrating on Things, Such as Reading the Newspaper or Watching Television 0-->not at all   Moving or Speaking So Slowly that Other People Could Have Noticed? Or the Opposite - Being So Fidgety 0-->not at all   Thoughts that You Would be Better Off Dead or of Hurting Yourself in Some Way 0-->not at all   PHQ-9: Brief Depression Severity Measure Score 7   If You Checked Off Any Problems, How Difficult Have These Problems Made It For You to Do Your Work, Take Care of Things at Home, or Get Along with Other People? not difficult at all       Health Habits and Functional and Cognitive Screening:  Functional & Cognitive Status 11/1/2022   Do you have difficulty preparing food and eating? No   Do you have difficulty bathing yourself, getting dressed or grooming yourself? No   Do you have difficulty using the toilet? No   Do you have difficulty moving around from place to place? No   Do you have trouble with steps or getting out of a bed or a chair? No   Current Diet Unhealthy Diet   Dental Exam Up to date   Eye Exam Up to date   Exercise (times per week) 0 times per week   Current Exercises Include No Regular Exercise   Do you need help using the phone?  No   Are you deaf or do you have serious difficulty hearing?  No   Do you need help with transportation? No   Do you need help shopping? No   Do you need help preparing meals?  No   Do you need help with housework?  No   Do you need help with laundry? No   Do you need help taking your medications? No   Do you need help managing money? No   Do you ever drive or ride in a car without wearing a seat belt? No   Have  you felt unusual stress, anger or loneliness in the last month? No   Who do you live with? Alone   If you need help, do you have trouble finding someone available to you? No   Have you been bothered in the last four weeks by sexual problems? No   Do you have difficulty concentrating, remembering or making decisions? No       Age-appropriate Screening Schedule:  Refer to the list below for future screening recommendations based on patient's age, sex and/or medical conditions. Orders for these recommended tests are listed in the plan section. The patient has been provided with a written plan.    Health Maintenance   Topic Date Due   • ZOSTER VACCINE (1 of 2) Never done   • INFLUENZA VACCINE  03/31/2023 (Originally 8/1/2022)   • TDAP/TD VACCINES (1 - Tdap) 08/04/2023 (Originally 3/16/1974)   • LIPID PANEL  09/02/2023   • DXA SCAN  08/30/2024   • MAMMOGRAM  09/20/2024              Assessment & Plan   CMS Preventative Services Quick Reference  Risk Factors Identified During Encounter  Recommend Adv directive, repeat colonoscopy when due, update vaccinations such as shingles    The above risks/problems have been discussed with the patient.  Follow up actions/plans if indicated are seen below in the Assessment/Plan Section.  Pertinent information has been shared with the patient in the After Visit Summary.    Diagnoses and all orders for this visit:    1. Encounter for subsequent annual wellness visit (AWV) in Medicare patient (Primary)    2. BURKE (obstructive sleep apnea)  -     Ambulatory Referral to Sleep Medicine    3. Mixed hyperlipidemia    4. Primary hypertension    5. Carpal tunnel syndrome of left wrist  -     EMG & Nerve Conduction Test; Future  -     Ambulatory Referral to Orthopedic Surgery    6. Anxiety and depression    7. Class 1 obesity due to excess calories without serious comorbidity with body mass index (BMI) of 34.0 to 34.9 in adult    Other orders  -     prazosin (MINIPRESS) 1 MG capsule; Take 1  capsule by mouth Every Night.  Dispense: 30 capsule; Refill: 2         AWV  - as above, reviewed and updated as appropriate, risk assessed, rec adv directive to be added to chart and update vaccinations    Carpal tunnel  - EMG ordered. She will be referred for orthopedic follow-up if no improvement is reported. Findings will be reviewed and possible further imaging or work-up will be obtained if necessary.    BURKE  - Sleep study is ordered. She is referred to a specialist.  - The study will hopefully be obtained as soon as possible as her quality of life is being affected by not being able to have an adequate and usable sleep machine to help with her chronic sleep apnea which is exacerbating other symptoms.     Sleep terrors  - Prazosin prescribed to use at night. She will continue to monitor her symptoms. She will follow up if no improvement or with concerns.    Chronic foot pain, plantar fasciitis, arthritis  - The patient will continue with exercises and physical therapy, follow up podiatry or specialist, further imaging if indicated    HTN  - controlled, at goal <140/90, continue medicines    Obesity, BMI 33  - manage weight, diet and exercise as appropriate to maintain and reduce to goal <30      She will follow up on above conditions in 2 months, sooner if worse or to review results.    Follow Up:   Return in about 6 months (around 5/1/2023), or if symptoms worsen or fail to improve, for Labs before, Recheck.     An After Visit Summary and PPPS were made available to the patient.  ,                Transcribed from ambient dictation for Robin Cavazos DO by Anabel Cook.  11/01/22   19:04 EDT    Patient or patient representative verbalized consent to the visit recording.  I have personally performed the services described in this document as transcribed by the above individual, and it is both accurate and complete.

## 2022-11-26 PROBLEM — M25.619 STIFFNESS OF SHOULDER JOINT: Status: RESOLVED | Noted: 2022-08-04 | Resolved: 2022-11-26

## 2022-11-26 PROBLEM — F41.9 ANXIETY AND DEPRESSION: Chronic | Status: ACTIVE | Noted: 2022-08-03

## 2022-11-26 PROBLEM — E66.811 CLASS 1 OBESITY DUE TO EXCESS CALORIES WITHOUT SERIOUS COMORBIDITY WITH BODY MASS INDEX (BMI) OF 34.0 TO 34.9 IN ADULT: Chronic | Status: ACTIVE | Noted: 2022-08-03

## 2022-11-26 PROBLEM — M71.9 DISORDER OF BURSAE OF SHOULDER REGION: Status: RESOLVED | Noted: 2022-08-04 | Resolved: 2022-11-26

## 2022-11-26 PROBLEM — M25.519 SHOULDER JOINT PAIN: Status: RESOLVED | Noted: 2022-08-04 | Resolved: 2022-11-26

## 2022-11-26 PROBLEM — E66.09 CLASS 1 OBESITY DUE TO EXCESS CALORIES WITHOUT SERIOUS COMORBIDITY WITH BODY MASS INDEX (BMI) OF 34.0 TO 34.9 IN ADULT: Chronic | Status: ACTIVE | Noted: 2022-08-03

## 2022-11-26 PROBLEM — U07.1 COVID-19 VIRUS DETECTED: Status: RESOLVED | Noted: 2022-08-04 | Resolved: 2022-11-26

## 2022-11-26 PROBLEM — G56.02 CARPAL TUNNEL SYNDROME OF LEFT WRIST: Status: ACTIVE | Noted: 2022-11-26

## 2022-11-26 PROBLEM — F32.A ANXIETY AND DEPRESSION: Chronic | Status: ACTIVE | Noted: 2022-08-03

## 2022-12-09 RX ORDER — PRAZOSIN HYDROCHLORIDE 1 MG/1
1 CAPSULE ORAL NIGHTLY
Qty: 90 CAPSULE | Refills: 1 | Status: SHIPPED | OUTPATIENT
Start: 2022-12-09

## 2023-03-07 ENCOUNTER — OFFICE VISIT (OUTPATIENT)
Dept: SLEEP MEDICINE | Facility: HOSPITAL | Age: 68
End: 2023-03-07
Payer: MEDICARE

## 2023-03-07 VITALS
HEART RATE: 84 BPM | DIASTOLIC BLOOD PRESSURE: 75 MMHG | BODY MASS INDEX: 34.91 KG/M2 | SYSTOLIC BLOOD PRESSURE: 147 MMHG | OXYGEN SATURATION: 95 % | HEIGHT: 64 IN | WEIGHT: 204.5 LBS

## 2023-03-07 DIAGNOSIS — G47.33 OSA (OBSTRUCTIVE SLEEP APNEA): Primary | ICD-10-CM

## 2023-03-07 DIAGNOSIS — G47.52 RBD (REM BEHAVIORAL DISORDER): ICD-10-CM

## 2023-03-07 DIAGNOSIS — E66.9 OBESITY (BMI 30-39.9): ICD-10-CM

## 2023-03-07 PROCEDURE — G0463 HOSPITAL OUTPT CLINIC VISIT: HCPCS

## 2023-03-07 NOTE — PROGRESS NOTES
Sleep Consultation    Patient Name: Suyapa Barrera  Age/Sex: 67 y.o. female  : 1955  MRN: [id number]    Date of Encounter Visit: 2023  Encounter Provider: Amy Armstrong MD  Referring Provider: Robin Cavazos DO  Place of Service: [facility]  Patient Care Team:  Robin Cavazso DO as PCP - General (Family Medicine)    Subjective:     Reason for Consult: BURKE    History of Present Illness:  Suyapa Barrera is a 67 y.o. female is here for evaluation of BURKE due to prior diagnosis of sleep apnea back in .  She was on CPAP that she used for up to 10 years but she always struggled with it and couldn't get it to work and she has been off treatment for couple of years  On those exceptional nights where she was able to use the CPAP, she did feel better  She is interested in alternative option   She did try the OMAD and she stopped it because she has lots of crowns and did not fit well and did not feel better on it as well   In the last few months she has been waking up screaming in her sleep.  She is completely unaware of doing this.     Patient complains of daytime fatigue and sleepiness with an Lamoure Sleepiness Scale (ESS) of 7.  Patient complains of excessive daytime fatigue, loud snoring in all position with witnessed apnea, waking up gasping for breath, waking up coughing, morning headache and sore throat.  She does have some aches and pains that affect her sleep quality as well and nocturnal reflux symptoms.  Sleep fragmentation with difficulty staying asleep and overall restless sleep  She also reported some nightmares but no complex behaviors at night.  Denies any symptoms of restless leg syndrome.   Patient denies any cataplexy, sleep paralysis or other symptoms to suggest narcolepsy.  Patient denies any parasomnias.  Denies any history of seizure disorder or recent head trauma.  Patient spends adequate amount of time in bed with no evidence of sleep restriction or  improper sleep hygiene.  She has bedtime around 11 PM, wake up time at 5:30 in the morning with reported 4 hours of sleep in between with a sleep onset of 30 minutes and with waking up feeling tired  Comorbidities include: Hypertension, anxiety and depression, arthritis, and patient is on metformin  Obesity with a BMI of 35    Review of Systems:   A twelve-system review was conducted and was negative except for the following: Anxiety, frequent heartburn, depression,.        Past Medical History:  Past Medical History:   Diagnosis Date   • Depression Sep 2019   • Diverticulosis Dec 2016   • Full thickness rotator cuff tear 8/4/2022       Past Surgical History:   Procedure Laterality Date   • BREAST SURGERY  Nov 1998    Bilateral reduction   • COLONOSCOPY  Dec 2016   • EYE SURGERY  Aug 2019    Lasik   • TUBAL ABDOMINAL LIGATION  Sep 1992       Home Medications:     Current Outpatient Medications:   •  lisinopril (PRINIVIL,ZESTRIL) 30 MG tablet, Take 1 tablet by mouth Daily., Disp: , Rfl:   •  metFORMIN (GLUCOPHAGE) 500 MG tablet, Take 1 tablet by mouth Daily With Breakfast., Disp: , Rfl:   •  omeprazole (priLOSEC) 40 MG capsule, Take 1 capsule by mouth Daily., Disp: , Rfl:   •  prazosin (MINIPRESS) 1 MG capsule, Take 1 capsule by mouth Every Night., Disp: 90 capsule, Rfl: 1  •  simvastatin (ZOCOR) 40 MG tablet, Take 1 tablet by mouth Every Night., Disp: , Rfl:   •  venlafaxine XR (EFFEXOR-XR) 75 MG 24 hr capsule, Take 1 capsule by mouth Daily., Disp: , Rfl:     Allergies:  No Known Allergies    Past Social History:  Social History     Socioeconomic History   • Marital status: Single   Tobacco Use   • Smoking status: Never   • Smokeless tobacco: Never   Vaping Use   • Vaping Use: Never used   Substance and Sexual Activity   • Alcohol use: Yes     Alcohol/week: 1.0 standard drink     Types: 1 Glasses of wine per week   • Drug use: Never   • Sexual activity: Not Currently     Partners: Female     Birth  "control/protection: Tubal ligation       Past Family History:  Family History   Problem Relation Age of Onset   • Alcohol abuse Mother            • Alcohol abuse Maternal Grandfather            • Early death Maternal Grandfather          at 49 of Heart attack   • Early death Maternal Grandmother          at 42 of heart attack   • Alcohol abuse Sister    • Drug abuse Sister    • Alcohol abuse Brother    • Drug abuse Brother        Objective:        Vital Signs:   Visit Vitals  /75   Pulse 84   Ht 162.6 cm (64\")   Wt 92.8 kg (204 lb 8 oz)   SpO2 95%   BMI 35.10 kg/m²     Wt Readings from Last 3 Encounters:   23 92.8 kg (204 lb 8 oz)   22 92.2 kg (203 lb 4.8 oz)   22 90 kg (198 lb 8 oz)     Neck Circumference: 15 inches    Physical Exam:   GEN:  No acute distress, alert, cooperative, well developed   EYES:   Sclerae clear. No icterus. PERRL. Normal EOM  ENT:   External ears/nose normal, no oral lesions, no thrush, mucous membranes moist, Septum midline. Mallampati IV Redundant membranous soft palate slightly enlarged tongue   NECK:  Supple, midline trachea, no JVD  LUNGS: Normal chest on inspection, CTAB, no wheezes. No rhonchi. No crackles. Respirations regular, even and unlabored.   CV:  Regular rhythm and rate. Normal S1/S2. No murmurs, gallops, or rubs noted.  ABD:  Soft, nontender and nondistended. Normal bowel sounds. No guarding  EXT:  Moves all extremities well. No cyanosis. No redness. No edema.   Skin: Dry, intact, no bleeding      Diagnostic Data:  No sleep study available, over 10 years old.     Assessment and Plan:       ICD-10-CM ICD-9-CM   1. BURKE (obstructive sleep apnea)  G47.33 327.23   2. RBD (REM behavioral disorder)  G47.52 327.42   3. Obesity (BMI 30-39.9)  E66.9 278.00       Recommendations:       Patient was educated in depth about BURKE and cardiovascular consequences if left untreated, including but not limited to CHF, CAD, arrhythmias, CVA, and/or " HTN. Education also provided about the diagnostic tools for BURKE, including the polysomnography and the treatment modalities, including the CPAP.     If patient has a mild obstructive sleep apnea will schedule a follow up to discuss treatment options including mandibular advancement device versus CPAP.      If patient has moderate to severe sleep apnea the recommend treatment is CPAP and will start CPAP and patient will follow up within 31-90 days after starting CPAP for compliance review.     Adherence to the CPAP is a key factor in successful treatment of BURKE and the patient was encouraged to contact us in case of problem with the CPAP or the mask that can limit the tolerance of the compliance with the therapy.  As far as the REM behavioral disorder, these are sometimes manifestation of sleep deprivation and they do improve with the treatment of the underlying condition which is the sleep apnea in her case  Patient have prior failure to tolerate the CPAP even though she had it for several years and is not opposed to the idea of trying it again but this time if she is unable to use the CPAP we will consider surgical evaluation for the hypoglossal nerve stimulator  She already tried the oral mandibular advancement device and it did not work as well  Continue to work on the weight loss     Orders Placed This Encounter   Procedures   • Polysomnography 4 or More Parameters     No orders of the defined types were placed in this encounter.     Return in about 3 months (around 6/7/2023).    Amy Armstrong MD   [facility]   03/07/23  10:53 EST    Dictated utilizing Dragon dictation

## 2023-03-20 ENCOUNTER — HOSPITAL ENCOUNTER (OUTPATIENT)
Dept: SLEEP MEDICINE | Facility: HOSPITAL | Age: 68
End: 2023-03-20
Payer: MEDICARE

## 2023-03-20 ENCOUNTER — HOSPITAL ENCOUNTER (OUTPATIENT)
Dept: SLEEP MEDICINE | Facility: HOSPITAL | Age: 68
Discharge: HOME | End: 2023-03-20
Admitting: INTERNAL MEDICINE
Payer: MEDICARE

## 2023-03-20 DIAGNOSIS — G47.52 RBD (REM BEHAVIORAL DISORDER): ICD-10-CM

## 2023-03-20 DIAGNOSIS — G47.33 OSA (OBSTRUCTIVE SLEEP APNEA): ICD-10-CM

## 2023-03-20 PROCEDURE — 95811 POLYSOM 6/>YRS CPAP 4/> PARM: CPT

## 2023-03-21 ENCOUNTER — PROCEDURE VISIT (OUTPATIENT)
Dept: NEUROLOGY | Facility: CLINIC | Age: 68
End: 2023-03-21
Payer: MEDICARE

## 2023-03-21 VITALS
BODY MASS INDEX: 35 KG/M2 | HEART RATE: 77 BPM | HEIGHT: 64 IN | DIASTOLIC BLOOD PRESSURE: 74 MMHG | SYSTOLIC BLOOD PRESSURE: 117 MMHG | WEIGHT: 205 LBS

## 2023-03-21 DIAGNOSIS — M25.532 CHRONIC PAIN OF LEFT WRIST: Primary | ICD-10-CM

## 2023-03-21 DIAGNOSIS — G89.29 CHRONIC PAIN OF LEFT WRIST: Primary | ICD-10-CM

## 2023-03-21 DIAGNOSIS — G56.02 CARPAL TUNNEL SYNDROME OF LEFT WRIST: ICD-10-CM

## 2023-03-21 DIAGNOSIS — G47.33 OSA (OBSTRUCTIVE SLEEP APNEA): Primary | ICD-10-CM

## 2023-03-21 PROCEDURE — 99202 OFFICE O/P NEW SF 15 MIN: CPT | Performed by: PSYCHIATRY & NEUROLOGY

## 2023-03-21 PROCEDURE — 95908 NRV CNDJ TST 3-4 STUDIES: CPT | Performed by: PSYCHIATRY & NEUROLOGY

## 2023-03-21 PROCEDURE — 95885 MUSC TST DONE W/NERV TST LIM: CPT | Performed by: PSYCHIATRY & NEUROLOGY

## 2023-03-21 NOTE — ASSESSMENT & PLAN NOTE
Nerve conduction study and limited EMG is normal.  There is no evidence of median neuropathy at the wrist.  Clinically her symptoms are secondary to joint disease in the left wrist.  I will refer to orthopedic surgery.

## 2023-03-21 NOTE — PROGRESS NOTES
"Chief Complaint  Numbness (NUMBNESS & TINGLING IN BUE, L>R)    Subjective          Suyapa Barrera is a 68 y.o. female who presents to [facility]  History of Present Illness  68-year-old woman evaluated for left wrist pain.  She states that this been ongoing for several months and it hurts for her to do activities such as using her left hand.  Is there all the time but it gets worse with activities.  Sometimes there is burning sensation in her left thumb area.  There is no numbness and tingling in her fingertips in the distribution of the median nerve.  Objective   Vital Signs:   /74   Pulse 77   Ht 162.6 cm (64.02\")   Wt 93 kg (205 lb)   BMI 35.17 kg/m²     Physical Exam   There is no weakness of the left upper extremity individual muscle testing.        Assessment and Plan  Diagnoses and all orders for this visit:    1. Chronic pain of left wrist (Primary)  -     Ambulatory Referral to Orthopedic Surgery    2. Carpal tunnel syndrome of left wrist  -     EMG & Nerve Conduction Test         Nerve Conduction Study:  4 nerves     EMG:  Limited    Total time spent with the patient and coordinating patient care was 20 minutes.    Follow Up  No follow-ups on file.  Patient was given instructions and counseling regarding her condition or for health maintenance advice. Please see specific information pulled into the AVS if appropriate.       "

## 2023-03-22 ENCOUNTER — TELEPHONE (OUTPATIENT)
Dept: SLEEP MEDICINE | Facility: HOSPITAL | Age: 68
End: 2023-03-22
Payer: OTHER GOVERNMENT

## 2023-04-06 ENCOUNTER — HOSPITAL ENCOUNTER (EMERGENCY)
Facility: HOSPITAL | Age: 68
Discharge: LEFT BEFORE DISCHARGE | End: 2023-04-06
Payer: MEDICARE

## 2023-04-06 ENCOUNTER — APPOINTMENT (OUTPATIENT)
Dept: GENERAL RADIOLOGY | Facility: HOSPITAL | Age: 68
End: 2023-04-06
Payer: MEDICARE

## 2023-04-06 VITALS
OXYGEN SATURATION: 98 % | SYSTOLIC BLOOD PRESSURE: 151 MMHG | HEART RATE: 88 BPM | RESPIRATION RATE: 20 BRPM | WEIGHT: 203.48 LBS | TEMPERATURE: 98 F | DIASTOLIC BLOOD PRESSURE: 69 MMHG | BODY MASS INDEX: 34.74 KG/M2 | HEIGHT: 64 IN

## 2023-04-06 LAB
ALBUMIN SERPL-MCNC: 3.9 G/DL (ref 3.5–5.2)
ALBUMIN/GLOB SERPL: 1.2 G/DL
ALP SERPL-CCNC: 90 U/L (ref 39–117)
ALT SERPL W P-5'-P-CCNC: 16 U/L (ref 1–33)
ANION GAP SERPL CALCULATED.3IONS-SCNC: 12.3 MMOL/L (ref 5–15)
AST SERPL-CCNC: 20 U/L (ref 1–32)
BASOPHILS # BLD AUTO: 0.04 10*3/MM3 (ref 0–0.2)
BASOPHILS NFR BLD AUTO: 0.5 % (ref 0–1.5)
BILIRUB SERPL-MCNC: 1.1 MG/DL (ref 0–1.2)
BUN SERPL-MCNC: 8 MG/DL (ref 8–23)
BUN/CREAT SERPL: 11.3 (ref 7–25)
CALCIUM SPEC-SCNC: 9.1 MG/DL (ref 8.6–10.5)
CHLORIDE SERPL-SCNC: 104 MMOL/L (ref 98–107)
CO2 SERPL-SCNC: 23.7 MMOL/L (ref 22–29)
CREAT SERPL-MCNC: 0.71 MG/DL (ref 0.57–1)
DEPRECATED RDW RBC AUTO: 41.7 FL (ref 37–54)
EGFRCR SERPLBLD CKD-EPI 2021: 93.9 ML/MIN/1.73
EOSINOPHIL # BLD AUTO: 0.09 10*3/MM3 (ref 0–0.4)
EOSINOPHIL NFR BLD AUTO: 1.1 % (ref 0.3–6.2)
ERYTHROCYTE [DISTWIDTH] IN BLOOD BY AUTOMATED COUNT: 13.2 % (ref 12.3–15.4)
GEN 5 2HR TROPONIN T REFLEX: <6 NG/L
GLOBULIN UR ELPH-MCNC: 3.2 GM/DL
GLUCOSE SERPL-MCNC: 132 MG/DL (ref 65–99)
HCT VFR BLD AUTO: 44.5 % (ref 34–46.6)
HGB BLD-MCNC: 15.4 G/DL (ref 12–15.9)
HOLD SPECIMEN: NORMAL
HOLD SPECIMEN: NORMAL
IMM GRANULOCYTES # BLD AUTO: 0.01 10*3/MM3 (ref 0–0.05)
IMM GRANULOCYTES NFR BLD AUTO: 0.1 % (ref 0–0.5)
LIPASE SERPL-CCNC: 26 U/L (ref 13–60)
LYMPHOCYTES # BLD AUTO: 2.21 10*3/MM3 (ref 0.7–3.1)
LYMPHOCYTES NFR BLD AUTO: 27.2 % (ref 19.6–45.3)
MAGNESIUM SERPL-MCNC: 1.7 MG/DL (ref 1.6–2.4)
MCH RBC QN AUTO: 30.4 PG (ref 26.6–33)
MCHC RBC AUTO-ENTMCNC: 34.6 G/DL (ref 31.5–35.7)
MCV RBC AUTO: 87.8 FL (ref 79–97)
MONOCYTES # BLD AUTO: 0.41 10*3/MM3 (ref 0.1–0.9)
MONOCYTES NFR BLD AUTO: 5 % (ref 5–12)
NEUTROPHILS NFR BLD AUTO: 5.37 10*3/MM3 (ref 1.7–7)
NEUTROPHILS NFR BLD AUTO: 66.1 % (ref 42.7–76)
NRBC BLD AUTO-RTO: 0 /100 WBC (ref 0–0.2)
NT-PROBNP SERPL-MCNC: 47.2 PG/ML (ref 0–900)
PLATELET # BLD AUTO: 217 10*3/MM3 (ref 140–450)
PMV BLD AUTO: 10 FL (ref 6–12)
POTASSIUM SERPL-SCNC: 3.6 MMOL/L (ref 3.5–5.2)
PROT SERPL-MCNC: 7.1 G/DL (ref 6–8.5)
QT INTERVAL: 371 MS
QT INTERVAL: 397 MS
RBC # BLD AUTO: 5.07 10*6/MM3 (ref 3.77–5.28)
SODIUM SERPL-SCNC: 140 MMOL/L (ref 136–145)
TROPONIN T DELTA: NORMAL
TROPONIN T SERPL HS-MCNC: <6 NG/L
WBC NRBC COR # BLD: 8.13 10*3/MM3 (ref 3.4–10.8)
WHOLE BLOOD HOLD COAG: NORMAL
WHOLE BLOOD HOLD SPECIMEN: NORMAL

## 2023-04-06 PROCEDURE — 99211 OFF/OP EST MAY X REQ PHY/QHP: CPT

## 2023-04-06 PROCEDURE — 83880 ASSAY OF NATRIURETIC PEPTIDE: CPT

## 2023-04-06 PROCEDURE — 84484 ASSAY OF TROPONIN QUANT: CPT

## 2023-04-06 PROCEDURE — 85025 COMPLETE CBC W/AUTO DIFF WBC: CPT

## 2023-04-06 PROCEDURE — 83690 ASSAY OF LIPASE: CPT

## 2023-04-06 PROCEDURE — 71045 X-RAY EXAM CHEST 1 VIEW: CPT

## 2023-04-06 PROCEDURE — 93005 ELECTROCARDIOGRAM TRACING: CPT

## 2023-04-06 PROCEDURE — 83735 ASSAY OF MAGNESIUM: CPT

## 2023-04-06 PROCEDURE — 93010 ELECTROCARDIOGRAM REPORT: CPT | Performed by: INTERNAL MEDICINE

## 2023-04-06 PROCEDURE — 80053 COMPREHEN METABOLIC PANEL: CPT

## 2023-04-06 RX ORDER — SODIUM CHLORIDE 0.9 % (FLUSH) 0.9 %
10 SYRINGE (ML) INJECTION AS NEEDED
Status: DISCONTINUED | OUTPATIENT
Start: 2023-04-06 | End: 2023-04-06 | Stop reason: HOSPADM

## 2023-04-06 RX ORDER — ASPIRIN 81 MG/1
324 TABLET, CHEWABLE ORAL ONCE
Status: DISCONTINUED | OUTPATIENT
Start: 2023-04-06 | End: 2023-04-06 | Stop reason: HOSPADM

## 2023-04-18 ENCOUNTER — PATIENT OUTREACH (OUTPATIENT)
Dept: CASE MANAGEMENT | Facility: OTHER | Age: 68
End: 2023-04-18
Payer: OTHER GOVERNMENT

## 2023-04-18 ENCOUNTER — TELEPHONE (OUTPATIENT)
Dept: CASE MANAGEMENT | Facility: OTHER | Age: 68
End: 2023-04-18
Payer: OTHER GOVERNMENT

## 2023-04-18 DIAGNOSIS — Z87.898 HISTORY OF PREDIABETES: Primary | ICD-10-CM

## 2023-04-18 NOTE — OUTREACH NOTE
AMBULATORY CASE MANAGEMENT NOTE    Name and Relationship of Patient/Support Person: Suyapa Barrera J - Self    Patient Outreach    Called for HRCM services and to discuss care gaps for DM but no DM diagnosis. She reports she moved from [place] and was in [place] as well and her PCP in [place] put her on Metformin BID with level at 7% but it is normal at 5.6% last labs done down there and so PCP said she could stop the Metformin but instead they decided to do once daily. She has not had recent A1c. She has a follow up with PCP 5/2/23 and will pend lab for then and see what it shows as her care gaps are all for DM needs. She is in agreement with plan.       Reena CERNA  Ambulatory Case Management    4/18/2023, 15:47 EDT

## 2023-04-18 NOTE — TELEPHONE ENCOUNTER
Pended A1c lab to be done at follow up with you on 5/2/23 to determine DM status. Reports was at one point 7% in [place] but PCP put on Metformin BID and is now taking only daily and reports last A1c was 5.6% but none done at her labs in September with us. Would like A1c done and I put diagnosis of history of pre diabetes since we do not have any abnormal labs to show dm nor did she have diagnosis for us but has care gaps for DM to close. I suppose she should do a urine microalbumin too just to be sure? Pending this aw well, if you do not want it then cancel please.

## 2023-04-19 ENCOUNTER — OFFICE VISIT (OUTPATIENT)
Dept: ORTHOPEDIC SURGERY | Facility: CLINIC | Age: 68
End: 2023-04-19
Payer: MEDICARE

## 2023-04-19 VITALS — HEIGHT: 64 IN | OXYGEN SATURATION: 96 % | BODY MASS INDEX: 35.75 KG/M2 | WEIGHT: 209.4 LBS | HEART RATE: 96 BPM

## 2023-04-19 DIAGNOSIS — M18.12 ARTHRITIS OF CARPOMETACARPAL (CMC) JOINT OF LEFT THUMB: ICD-10-CM

## 2023-04-19 DIAGNOSIS — M79.642 LEFT HAND PAIN: Primary | ICD-10-CM

## 2023-04-19 RX ADMIN — LIDOCAINE HYDROCHLORIDE 1 ML: 10 INJECTION, SOLUTION INFILTRATION; PERINEURAL at 11:32

## 2023-04-19 RX ADMIN — METHYLPREDNISOLONE ACETATE 80 MG: 80 INJECTION, SUSPENSION INTRA-ARTICULAR; INTRALESIONAL; INTRAMUSCULAR; SOFT TISSUE at 11:32

## 2023-04-19 NOTE — PROGRESS NOTES
"Chief Complaint  Initial Evaluation of the Left Hand     Subjective      Suyapa Barrera presents to [facility] for initial evaluation of the left hand.  She has had pain for 9 months and feels like her hand is on fire.  She has difficulty with FMC  and drops items frequently.  She has had no recent injury.     No Known Allergies     Social History     Socioeconomic History   • Marital status: Single   Tobacco Use   • Smoking status: Never   • Smokeless tobacco: Never   Vaping Use   • Vaping Use: Never used   Substance and Sexual Activity   • Alcohol use: Yes     Alcohol/week: 1.0 standard drink     Types: 1 Glasses of wine per week     Comment: Maybe one drink a month   • Drug use: Never   • Sexual activity: Not Currently     Partners: Male     Birth control/protection: Tubal ligation        Review of Systems     Objective   Vital Signs:   Pulse 96   Ht 162.6 cm (64\")   Wt 95 kg (209 lb 6.4 oz)   SpO2 96%   BMI 35.94 kg/m²       Physical Exam  Constitutional:       Appearance: Normal appearance. Patient is well-developed and normal weight.   HENT:      Head: Normocephalic.      Right Ear: Hearing and external ear normal.      Left Ear: Hearing and external ear normal.      Nose: Nose normal.   Eyes:      Conjunctiva/sclera: Conjunctivae normal.   Cardiovascular:      Rate and Rhythm: Normal rate.   Pulmonary:      Effort: Pulmonary effort is normal.      Breath sounds: No wheezing or rales.   Abdominal:      Palpations: Abdomen is soft.      Tenderness: There is no abdominal tenderness.   Musculoskeletal:      Cervical back: Normal range of motion.   Skin:     Findings: No rash.   Neurological:      Mental Status: Patient  is alert and oriented to person, place, and time.   Psychiatric:         Mood and Affect: Mood and affect normal.         Judgment: Judgment normal.       Ortho Exam      LEFT HAND Sensation grossly intact to light touch, median, radial and ulnar nerve. " Positive AIN, PIN and ulnar nerve motor function intact. Axillary nerve intact. Positive pulses. Positive finklesteins.  Tender at base of thumb.          Medium Joint LEFT WRIST  Date/Time: 4/19/2023 11:32 AM  Site marked: site marked  Timeout: Immediately prior to procedure a time out was called to verify the correct patient, procedure, equipment, support staff and site/side marked as required   Supporting Documentation  Indications: pain   Procedure Details  Location: wrist (LEFT) -   Preparation: Patient was prepped and draped in the usual sterile fashion  Needle size: 23 G  Medications administered: 1 mL lidocaine 1 %; 80 mg methylPREDNISolone acetate 80 MG/ML  Patient tolerance: patient tolerated the procedure well with no immediate complications              Imaging Results (Most Recent)     Procedure Component Value Units Date/Time    XR Hand 2 View Left [[id number]] Resulted: 04/19/23 1107     Updated: 04/19/23 1110           Result Review :      X-Ray Report:  Left hand  X-Ray  Indication: Evaluation of the left hand.   AP/Lateral view(s)  Findings: Moderate STT arthritis and CMC joint arthritis.   Prior studies available for comparison: no              Assessment and Plan     Diagnoses and all orders for this visit:    1. Left hand pain (Primary)  -     XR Hand 2 View Left    2. Arthritis of carpometacarpal (CMC) joint of left thumb    Other orders  -     Medium Joint LEFT WRIST        Discussed the treatment plan with the patient. I reviewed the X-rays that were obtained today with the patient. The patient issued a left hand thumb brace.  Discussed the risks and benefits of conservative measures.  The patient expressed understanding and wished to proceed with a left hand steroid injection.  She tolerated the injection well.     .    Call or return if worsening symptoms.    Follow Up     PRN    Patient was given instructions and counseling regarding her condition or for health maintenance advice. Please  see specific information pulled into the AVS if appropriate.     Scribed for Kris Marlow MD by Lalita Schneider MA.  04/19/23   11:21 EDT    I have personally performed the services described in this document as scribed by the above individual and it is both accurate and complete. Kris Marlow MD 04/21/23

## 2023-04-21 RX ORDER — METHYLPREDNISOLONE ACETATE 80 MG/ML
80 INJECTION, SUSPENSION INTRA-ARTICULAR; INTRALESIONAL; INTRAMUSCULAR; SOFT TISSUE
Status: COMPLETED | OUTPATIENT
Start: 2023-04-19 | End: 2023-04-19

## 2023-04-21 RX ORDER — LIDOCAINE HYDROCHLORIDE 10 MG/ML
1 INJECTION, SOLUTION INFILTRATION; PERINEURAL
Status: COMPLETED | OUTPATIENT
Start: 2023-04-19 | End: 2023-04-19

## 2023-05-02 ENCOUNTER — OFFICE VISIT (OUTPATIENT)
Dept: FAMILY MEDICINE CLINIC | Facility: CLINIC | Age: 68
End: 2023-05-02
Payer: MEDICARE

## 2023-05-02 VITALS
RESPIRATION RATE: 12 BRPM | BODY MASS INDEX: 34.72 KG/M2 | DIASTOLIC BLOOD PRESSURE: 80 MMHG | WEIGHT: 203.4 LBS | OXYGEN SATURATION: 96 % | HEART RATE: 79 BPM | TEMPERATURE: 98.2 F | SYSTOLIC BLOOD PRESSURE: 144 MMHG | HEIGHT: 64 IN

## 2023-05-02 DIAGNOSIS — K21.9 GASTROESOPHAGEAL REFLUX DISEASE WITHOUT ESOPHAGITIS: ICD-10-CM

## 2023-05-02 DIAGNOSIS — E11.65 TYPE 2 DIABETES MELLITUS WITH HYPERGLYCEMIA, WITHOUT LONG-TERM CURRENT USE OF INSULIN: ICD-10-CM

## 2023-05-02 DIAGNOSIS — E66.09 CLASS 1 OBESITY DUE TO EXCESS CALORIES WITHOUT SERIOUS COMORBIDITY WITH BODY MASS INDEX (BMI) OF 34.0 TO 34.9 IN ADULT: Chronic | ICD-10-CM

## 2023-05-02 DIAGNOSIS — I10 PRIMARY HYPERTENSION: ICD-10-CM

## 2023-05-02 DIAGNOSIS — F41.8 DEPRESSION WITH ANXIETY: Primary | Chronic | ICD-10-CM

## 2023-05-02 DIAGNOSIS — E78.2 MIXED HYPERLIPIDEMIA: Chronic | ICD-10-CM

## 2023-05-02 PROCEDURE — 1159F MED LIST DOCD IN RCRD: CPT | Performed by: FAMILY MEDICINE

## 2023-05-02 PROCEDURE — 3079F DIAST BP 80-89 MM HG: CPT | Performed by: FAMILY MEDICINE

## 2023-05-02 PROCEDURE — 1160F RVW MEDS BY RX/DR IN RCRD: CPT | Performed by: FAMILY MEDICINE

## 2023-05-02 PROCEDURE — 99214 OFFICE O/P EST MOD 30 MIN: CPT | Performed by: FAMILY MEDICINE

## 2023-05-02 PROCEDURE — 3077F SYST BP >= 140 MM HG: CPT | Performed by: FAMILY MEDICINE

## 2023-05-02 RX ORDER — VENLAFAXINE HYDROCHLORIDE 150 MG/1
150 CAPSULE, EXTENDED RELEASE ORAL DAILY
Qty: 90 CAPSULE | Refills: 3 | Status: SHIPPED | OUTPATIENT
Start: 2023-05-02

## 2023-05-02 RX ORDER — LISINOPRIL 20 MG/1
TABLET ORAL
COMMUNITY
Start: 2023-04-27 | End: 2023-05-02

## 2023-05-02 NOTE — PROGRESS NOTES
Chief Complaint  Depression (Medication review)    Subjective        Suyapa Barrera presents to [facility]  History of Present Illness    Presents to follow-up on depression.  She does not feel helpless hopeless or other.  She is taking prazosin for the nightmares and PTSD symptoms also takes Effexor 150 mg.    She is interested in losing weight her BMI 34.91 blood pressure remaining elevated above 140/90 more so than in clinic before.  She has diabetes and would benefit from Ozempic to manage her weight diet and reduce her overall risk  Last a1c was really good at goal <7 in 2020 so will need to recheck with any other labs along with eye and foot exams, update vaccinations.       Per last note 11/2022...     AWV  - reviewed and updated as appropriate, risk assessed, rec adv directive to be added to chart and update vaccinations     Carpal tunnel  - EMG ordered. She will be referred for orthopedic follow-up if no improvement is reported. Findings will be reviewed and possible further imaging or work-up will be obtained if necessary.     BURKE  - Sleep study is ordered. She is referred to a specialist.  - The study will hopefully be obtained as soon as possible as her quality of life is being affected by not being able to have an adequate and usable sleep machine to help with her chronic sleep apnea which is exacerbating other symptoms.      Sleep terrors  - Prazosin prescribed to use at night. She will continue to monitor her symptoms. She will follow up if no improvement or with concerns.     Chronic foot pain, plantar fasciitis, arthritis  - The patient will continue with exercises and physical therapy, follow up podiatry or specialist, further imaging if indicated     HTN  - controlled, at goal <140/90, continue medicines     Obesity, BMI 33  - manage weight, diet and exercise as appropriate to maintain and reduce to goal <30        Objective   Vital Signs:  /80   Pulse 79   " Temp 98.2 °F (36.8 °C)   Resp 12   Ht 162.6 cm (64\")   Wt 92.3 kg (203 lb 6.4 oz)   SpO2 96%   BMI 34.91 kg/m²   Estimated body mass index is 34.91 kg/m² as calculated from the following:    Height as of this encounter: 162.6 cm (64\").    Weight as of this encounter: 92.3 kg (203 lb 6.4 oz).       BMI is >= 30 and <35. (Class 1 Obesity). The following options were offered after discussion;: exercise counseling/recommendations      Physical Exam  Vitals reviewed.   Constitutional:       Appearance: Normal appearance. She is well-developed.   HENT:      Head: Normocephalic and atraumatic.      Right Ear: External ear normal.      Left Ear: External ear normal.      Nose: Nose normal.   Eyes:      Conjunctiva/sclera: Conjunctivae normal.      Pupils: Pupils are equal, round, and reactive to light.   Cardiovascular:      Rate and Rhythm: Normal rate.   Pulmonary:      Effort: Pulmonary effort is normal.      Breath sounds: Normal breath sounds.   Abdominal:      General: There is no distension.   Skin:     General: Skin is warm and dry.   Neurological:      Mental Status: She is alert and oriented to person, place, and time. Mental status is at baseline.   Psychiatric:         Mood and Affect: Mood and affect normal.         Behavior: Behavior normal.         Thought Content: Thought content normal.         Judgment: Judgment normal.        Result Review :  The following data was reviewed by: Robin Cavazos DO on 05/02/2023:  Common labs        9/2/2022    08:08 4/6/2023    14:31   Common Labs   Glucose 97   132     BUN 11   8     Creatinine 0.93   0.71     Sodium 139   140     Potassium 4.2   3.6     Chloride 105   104     Calcium 9.1   9.1     Albumin 4.00   3.9     Total Bilirubin 0.8   1.1     Alkaline Phosphatase 84   90     AST (SGOT) 21   20     ALT (SGPT) 12   16     WBC 6.05   8.13     Hemoglobin 15.1   15.4     Hematocrit 43.9   44.5     Platelets 222   217     Total Cholesterol 172      Triglycerides 85 "      HDL Cholesterol 54      LDL Cholesterol  102        Data reviewed: Radiologic studies Moderate STT arthritis and CMC joint arthritis seen on left hand XR 4/19/2023             Assessment and Plan   Diagnoses and all orders for this visit:    1. Depression with anxiety (Primary)  Assessment & Plan:  Patient's depression is recurrent and is mild without psychosis. Their depression is currently in full remission and the condition is improving with treatment. This will be reassessed in 4 weeks. F/U as described:patient will continue current medication therapy.  No SI/HI, follow up with counselor tomorrow    Orders:  -     venlafaxine XR (Effexor XR) 150 MG 24 hr capsule; Take 1 capsule by mouth Daily.  Dispense: 90 capsule; Refill: 3    2. Primary hypertension  Assessment & Plan:  Hypertension is above goal 140/90 today, keep check on at home and will change if its above range.  Continue current treatment regimen.  Blood pressure will be reassessed at the next regular appointment.      3. Mixed hyperlipidemia  Assessment & Plan:  Lipid abnormalities are improving with treatment.  Pharmacotherapy as ordered.  Lipids will be reassessed in 6 months.      4. Class 1 obesity due to excess calories without serious comorbidity with body mass index (BMI) of 34.0 to 34.9 in adult  Assessment & Plan:  Patient's (Body mass index is 34.91 kg/m².) indicates that they are morbidly/severely obese (BMI > 40 or > 35 with obesity - related health condition) with health conditions that include obstructive sleep apnea, hypertension, diabetes mellitus, dyslipidemias, GERD and osteoarthritis . Weight is unchanged. BMI  is above average; BMI management plan is completed. We discussed portion control, increasing exercise and pharmacologic options including Ozempic.       5. Gastroesophageal reflux disease without esophagitis    6. Type 2 diabetes mellitus with hyperglycemia, without long-term current use of insulin  Assessment &  Plan:  *controlled  Change to ozempic to help with weight loss  Ordered and sent to mail order, directions to titrate  Last a1c was 5.6 but stopping metformin and making diet changes        Other orders  -     Semaglutide,0.25 or 0.5MG/DOS, (Ozempic, 0.25 or 0.5 MG/DOSE,) 2 MG/1.5ML solution pen-injector; Inject 0.25 mg under the skin into the appropriate area as directed 1 (One) Time Per Week.  Dispense: 1.5 mL; Refill: 0           Follow Up   Return in about 6 weeks (around 6/13/2023), or if symptoms worsen or fail to improve, for AWV due after 11/2/2023, Recheck, BP & Log.  Patient was given instructions and counseling regarding her condition or for health maintenance advice. Please see specific information pulled into the AVS if appropriate.       Answers for HPI/ROS submitted by the patient on 5/1/2023  Please describe your symptoms.: Nauseous after eating, feeling of despair,  Have you had these symptoms before?: Yes  How long have you been having these symptoms?: Greater than 2 weeks  Please list any medications you are currently taking for this condition.: Effexor  What is the primary reason for your visit?: Other

## 2023-05-05 ENCOUNTER — PATIENT OUTREACH (OUTPATIENT)
Dept: CASE MANAGEMENT | Facility: OTHER | Age: 68
End: 2023-05-05
Payer: OTHER GOVERNMENT

## 2023-05-05 NOTE — OUTREACH NOTE
AMBULATORY CASE MANAGEMENT NOTE    Name and Relationship of Patient/Support Person: Suyapa Barrera - Self    Patient Outreach    Spoke with patient and she was unaware for labs needed for DM evaluation. She will get her urine microalbumin and A1c done prior to her June follow up. No other needs expressed.         Reena CERNA  Ambulatory Case Management    5/5/2023, 12:49 EDT

## 2023-05-15 PROBLEM — F41.8 DEPRESSION WITH ANXIETY: Chronic | Status: ACTIVE | Noted: 2022-08-03

## 2023-05-15 PROBLEM — E11.65 TYPE 2 DIABETES MELLITUS WITH HYPERGLYCEMIA, WITHOUT LONG-TERM CURRENT USE OF INSULIN: Chronic | Status: ACTIVE | Noted: 2022-08-03

## 2023-05-15 RX ORDER — SEMAGLUTIDE 1.34 MG/ML
0.25 INJECTION, SOLUTION SUBCUTANEOUS WEEKLY
Qty: 1.5 ML | Refills: 0 | Status: SHIPPED | OUTPATIENT
Start: 2023-05-15

## 2023-05-15 NOTE — ASSESSMENT & PLAN NOTE
Hypertension is above goal 140/90 today, keep check on at home and will change if its above range.  Continue current treatment regimen.  Blood pressure will be reassessed at the next regular appointment.

## 2023-05-15 NOTE — ASSESSMENT & PLAN NOTE
Patient's (Body mass index is 34.91 kg/m².) indicates that they are morbidly/severely obese (BMI > 40 or > 35 with obesity - related health condition) with health conditions that include obstructive sleep apnea, hypertension, diabetes mellitus, dyslipidemias, GERD and osteoarthritis . Weight is unchanged. BMI  is above average; BMI management plan is completed. We discussed portion control, increasing exercise and pharmacologic options including Ozempic.

## 2023-05-15 NOTE — ASSESSMENT & PLAN NOTE
Patient's depression is recurrent and is mild without psychosis. Their depression is currently in full remission and the condition is improving with treatment. This will be reassessed in 4 weeks. F/U as described:patient will continue current medication therapy.  No SI/HI, follow up with counselor tomorrow

## 2023-05-15 NOTE — ASSESSMENT & PLAN NOTE
*controlled  Change to ozempic to help with weight loss  Ordered and sent to mail order, directions to titrate  Last a1c was 5.6 but stopping metformin and making diet changes

## 2023-05-25 RX ORDER — ONDANSETRON 4 MG/1
4 TABLET, ORALLY DISINTEGRATING ORAL EVERY 8 HOURS PRN
Qty: 30 TABLET | Refills: 0 | Status: SHIPPED | OUTPATIENT
Start: 2023-05-25

## 2023-05-25 NOTE — TELEPHONE ENCOUNTER
----- Message from Suyapa Barrera sent at 5/25/2023  8:40 AM EDT -----  Regarding: Ozempic Prescription   Contact: [phone]  Dr. Cavazos,  I am on my third week of Ozempic and the nausea is taking over my life.  When you provided me with the Ozempic, you said you could prescribe Zofran, if needed.  It’s needed!  I understand there is a melt away tablet availabile.  If so, please send the prescription to [facility] in [place].  Thank you so much!  Suyapa

## 2023-08-24 ENCOUNTER — LAB (OUTPATIENT)
Dept: LAB | Facility: HOSPITAL | Age: 68
End: 2023-08-24
Payer: MEDICARE

## 2023-08-24 ENCOUNTER — OFFICE VISIT (OUTPATIENT)
Dept: FAMILY MEDICINE CLINIC | Facility: CLINIC | Age: 68
End: 2023-08-24
Payer: MEDICARE

## 2023-08-24 VITALS
OXYGEN SATURATION: 96 % | BODY MASS INDEX: 31.92 KG/M2 | SYSTOLIC BLOOD PRESSURE: 133 MMHG | DIASTOLIC BLOOD PRESSURE: 73 MMHG | TEMPERATURE: 97.8 F | WEIGHT: 187 LBS | HEIGHT: 64 IN | HEART RATE: 88 BPM

## 2023-08-24 DIAGNOSIS — K21.00 GASTROESOPHAGEAL REFLUX DISEASE WITH ESOPHAGITIS WITHOUT HEMORRHAGE: ICD-10-CM

## 2023-08-24 DIAGNOSIS — R19.5 ABNORMAL STOOL COLOR: ICD-10-CM

## 2023-08-24 DIAGNOSIS — Z23 NEED FOR VACCINATION: Primary | ICD-10-CM

## 2023-08-24 DIAGNOSIS — E66.09 CLASS 1 OBESITY DUE TO EXCESS CALORIES WITHOUT SERIOUS COMORBIDITY WITH BODY MASS INDEX (BMI) OF 34.0 TO 34.9 IN ADULT: Chronic | ICD-10-CM

## 2023-08-24 DIAGNOSIS — E11.65 TYPE 2 DIABETES MELLITUS WITH HYPERGLYCEMIA, WITHOUT LONG-TERM CURRENT USE OF INSULIN: Chronic | ICD-10-CM

## 2023-08-24 LAB
ALBUMIN SERPL-MCNC: 3.8 G/DL (ref 3.5–5.2)
ALBUMIN/GLOB SERPL: 1.3 G/DL
ALP SERPL-CCNC: 81 U/L (ref 39–117)
ALT SERPL W P-5'-P-CCNC: 19 U/L (ref 1–33)
ANION GAP SERPL CALCULATED.3IONS-SCNC: 10 MMOL/L (ref 5–15)
AST SERPL-CCNC: 27 U/L (ref 1–32)
BASOPHILS # BLD AUTO: 0.06 10*3/MM3 (ref 0–0.2)
BASOPHILS NFR BLD AUTO: 0.7 % (ref 0–1.5)
BILIRUB SERPL-MCNC: 0.9 MG/DL (ref 0–1.2)
BUN SERPL-MCNC: 8 MG/DL (ref 8–23)
BUN/CREAT SERPL: 8.3 (ref 7–25)
CALCIUM SPEC-SCNC: 9.4 MG/DL (ref 8.6–10.5)
CHLORIDE SERPL-SCNC: 106 MMOL/L (ref 98–107)
CO2 SERPL-SCNC: 24 MMOL/L (ref 22–29)
CREAT SERPL-MCNC: 0.96 MG/DL (ref 0.57–1)
DEPRECATED RDW RBC AUTO: 41.4 FL (ref 37–54)
EGFRCR SERPLBLD CKD-EPI 2021: 65.4 ML/MIN/1.73
EOSINOPHIL # BLD AUTO: 0.14 10*3/MM3 (ref 0–0.4)
EOSINOPHIL NFR BLD AUTO: 1.6 % (ref 0.3–6.2)
ERYTHROCYTE [DISTWIDTH] IN BLOOD BY AUTOMATED COUNT: 12.7 % (ref 12.3–15.4)
GLOBULIN UR ELPH-MCNC: 3 GM/DL
GLUCOSE SERPL-MCNC: 94 MG/DL (ref 65–99)
HCT VFR BLD AUTO: 45.6 % (ref 34–46.6)
HGB BLD-MCNC: 15.8 G/DL (ref 12–15.9)
IMM GRANULOCYTES # BLD AUTO: 0.01 10*3/MM3 (ref 0–0.05)
IMM GRANULOCYTES NFR BLD AUTO: 0.1 % (ref 0–0.5)
LYMPHOCYTES # BLD AUTO: 1.83 10*3/MM3 (ref 0.7–3.1)
LYMPHOCYTES NFR BLD AUTO: 20.5 % (ref 19.6–45.3)
MCH RBC QN AUTO: 31.2 PG (ref 26.6–33)
MCHC RBC AUTO-ENTMCNC: 34.6 G/DL (ref 31.5–35.7)
MCV RBC AUTO: 90.1 FL (ref 79–97)
MONOCYTES # BLD AUTO: 0.47 10*3/MM3 (ref 0.1–0.9)
MONOCYTES NFR BLD AUTO: 5.3 % (ref 5–12)
NEUTROPHILS NFR BLD AUTO: 6.41 10*3/MM3 (ref 1.7–7)
NEUTROPHILS NFR BLD AUTO: 71.8 % (ref 42.7–76)
NRBC BLD AUTO-RTO: 0 /100 WBC (ref 0–0.2)
PLATELET # BLD AUTO: 224 10*3/MM3 (ref 140–450)
PMV BLD AUTO: 10.9 FL (ref 6–12)
POTASSIUM SERPL-SCNC: 4 MMOL/L (ref 3.5–5.2)
PROT SERPL-MCNC: 6.8 G/DL (ref 6–8.5)
RBC # BLD AUTO: 5.06 10*6/MM3 (ref 3.77–5.28)
SODIUM SERPL-SCNC: 140 MMOL/L (ref 136–145)
WBC NRBC COR # BLD: 8.92 10*3/MM3 (ref 3.4–10.8)

## 2023-08-24 PROCEDURE — 85025 COMPLETE CBC W/AUTO DIFF WBC: CPT

## 2023-08-24 PROCEDURE — 36415 COLL VENOUS BLD VENIPUNCTURE: CPT

## 2023-08-24 PROCEDURE — 80053 COMPREHEN METABOLIC PANEL: CPT

## 2023-08-24 NOTE — PROGRESS NOTES
Chief Complaint   Patient presents with    Throat Spasms      Symptoms since Saturday, throat feels like it does when you vomit.     Acid reflex     Burning and pressure at night that comes up and makes her feel sick.     Vomiting    Diarrhea     Since Sunday.        Subjective          Suyapa Barrera presents to [facility]    History of Present Illness  Suyapa is here to be seen for throat spasms, acid reflux, vomiting and diarrhea. She has had these things happening since Saturday and Sunday. She feels like her throat is spasming almost like she is going to vomit but she doesn't. She states her stool has also been white in color. She has not changed her diet and has not changed any medications recently.     Past History:  Medical History: has a past medical history of Arthritis (4/2023), Depression (09/2019), Diverticulosis (12/2016), Full thickness rotator cuff tear (08/04/2022), Hyperlipidemia (October 2011), Hypertension (October 2011), and Sleep apnea (July 2012).   Surgical History: has a past surgical history that includes Breast surgery (Nov 1998); Eye surgery (Aug 2019); Tubal ligation (Sep 1992); and Colonoscopy (Dec 2016).   Family History: family history includes Alcohol abuse in her brother, maternal grandfather, mother, and sister; Drug abuse in her brother and sister; Early death in her maternal grandfather and maternal grandmother.   Social History: reports that she has never smoked. She has never been exposed to tobacco smoke. She has never used smokeless tobacco. She reports current alcohol use of about 1.0 standard drink per week. She reports that she does not use drugs.  Allergies: Azithromycin  (Not in a hospital admission)       Social History     Socioeconomic History    Marital status: Single   Tobacco Use    Smoking status: Never     Passive exposure: Never    Smokeless tobacco: Never   Vaping Use    Vaping Use: Never used   Substance and Sexual Activity  "   Alcohol use: Yes     Alcohol/week: 1.0 standard drink     Types: 1 Glasses of wine per week     Comment: Maybe one drink a month    Drug use: Never    Sexual activity: Not Currently     Partners: Male     Birth control/protection: Tubal ligation       Health Maintenance Due   Topic Date Due    HEPATITIS C SCREENING  Never done       Objective     Vital Signs:   /73 (BP Location: Left arm, Patient Position: Sitting)   Pulse 88   Temp 97.8 øF (36.6 øC) (Infrared)   Ht 162.6 cm (64\")   Wt 84.8 kg (187 lb)   SpO2 96%   BMI 32.10 kg/mý       Physical Exam  Constitutional:       Appearance: Normal appearance.   HENT:      Nose: Nose normal.      Mouth/Throat:      Mouth: Mucous membranes are moist.   Cardiovascular:      Rate and Rhythm: Normal rate and regular rhythm.      Pulses: Normal pulses.      Heart sounds: Normal heart sounds.   Pulmonary:      Effort: Pulmonary effort is normal.      Breath sounds: Normal breath sounds.   Skin:     General: Skin is warm and dry.   Neurological:      General: No focal deficit present.      Mental Status: She is alert and oriented to person, place, and time.   Psychiatric:         Mood and Affect: Mood normal.         Behavior: Behavior normal.        Review of Systems   Gastrointestinal:  Positive for nausea, vomiting and GERD.   All other systems reviewed and are negative.     Result Review :                 Assessment and Plan    Diagnoses and all orders for this visit:    1. Need for vaccination (Primary)  -     Pneumococcal Conjugate Vaccine 20-Valent (PCV20)    2. Type 2 diabetes mellitus with hyperglycemia, without long-term current use of insulin  Assessment & Plan:  Diabetes is unchanged.   Continue current treatment regimen.  Diabetes will be reassessed in 6 months.      3. Class 1 obesity due to excess calories without serious comorbidity with body mass index (BMI) of 34.0 to 34.9 in adult  Assessment & Plan:  Patient's (Body mass index is 32.1 kg/mý.) " indicates that they are obese (BMI >30) with health conditions that include diabetes mellitus . Weight is unchanged. BMI  is above average; BMI management plan is completed. We discussed portion control and increasing exercise.       4. Gastroesophageal reflux disease with esophagitis without hemorrhage  Assessment & Plan:  Continue on Prilosec at this time. Follow up with gastro for increased reflux symptoms.    Orders:  -     CBC w AUTO Differential; Future  -     Comprehensive metabolic panel; Future  -     Ambulatory Referral to Gastroenterology    5. Abnormal stool color  -     CBC w AUTO Differential; Future  -     Comprehensive metabolic panel; Future  -     Ambulatory Referral to Gastroenterology          Pt thought to be clinically stable at this time.    Follow Up   Return in about 4 weeks (around 9/21/2023), or if symptoms worsen or fail to improve.  Patient was given instructions and counseling regarding her condition or for health maintenance advice. Please see specific information pulled into the AVS if appropriate.

## 2023-08-30 NOTE — ASSESSMENT & PLAN NOTE
Patient's (Body mass index is 32.1 kg/mý.) indicates that they are obese (BMI >30) with health conditions that include diabetes mellitus . Weight is unchanged. BMI  is above average; BMI management plan is completed. We discussed portion control and increasing exercise.

## 2023-09-25 ENCOUNTER — OFFICE VISIT (OUTPATIENT)
Dept: FAMILY MEDICINE CLINIC | Facility: CLINIC | Age: 68
End: 2023-09-25

## 2023-09-25 VITALS
SYSTOLIC BLOOD PRESSURE: 118 MMHG | DIASTOLIC BLOOD PRESSURE: 70 MMHG | RESPIRATION RATE: 16 BRPM | HEIGHT: 64 IN | TEMPERATURE: 98.7 F | HEART RATE: 86 BPM | BODY MASS INDEX: 31.76 KG/M2 | OXYGEN SATURATION: 99 % | WEIGHT: 186 LBS

## 2023-09-25 DIAGNOSIS — E66.09 CLASS 1 OBESITY DUE TO EXCESS CALORIES WITHOUT SERIOUS COMORBIDITY WITH BODY MASS INDEX (BMI) OF 34.0 TO 34.9 IN ADULT: Chronic | ICD-10-CM

## 2023-09-25 DIAGNOSIS — E11.65 TYPE 2 DIABETES MELLITUS WITH HYPERGLYCEMIA, WITHOUT LONG-TERM CURRENT USE OF INSULIN: Primary | Chronic | ICD-10-CM

## 2023-09-25 DIAGNOSIS — I10 HYPERTENSION, UNSPECIFIED TYPE: Chronic | ICD-10-CM

## 2023-09-25 DIAGNOSIS — E78.2 MIXED HYPERLIPIDEMIA: Chronic | ICD-10-CM

## 2023-09-25 PROCEDURE — 3078F DIAST BP <80 MM HG: CPT | Performed by: FAMILY MEDICINE

## 2023-09-25 PROCEDURE — 99214 OFFICE O/P EST MOD 30 MIN: CPT | Performed by: FAMILY MEDICINE

## 2023-09-25 PROCEDURE — 3074F SYST BP LT 130 MM HG: CPT | Performed by: FAMILY MEDICINE

## 2023-09-25 PROCEDURE — 1159F MED LIST DOCD IN RCRD: CPT | Performed by: FAMILY MEDICINE

## 2023-09-25 PROCEDURE — 3044F HG A1C LEVEL LT 7.0%: CPT | Performed by: FAMILY MEDICINE

## 2023-09-25 PROCEDURE — 1160F RVW MEDS BY RX/DR IN RCRD: CPT | Performed by: FAMILY MEDICINE

## 2023-09-25 NOTE — PROGRESS NOTES
"Chief Complaint  Diabetes, Hypertension, Hyperlipidemia, and Obesity    Subjective        Suyapa Barrera presents to [facility]  History of Present Illness    Patient not tolerating Ozempic would like to try another medication to make her quite sick and ill the last time she used    Like to try to change to Mounjaro A1c goal less than 7 continue to manage weight diet at home, having success losing bmi goal <30    Objective   Vital Signs:  /70   Pulse 86   Temp 98.7 °F (37.1 °C)   Resp 16   Ht 162.6 cm (64\")   Wt 84.4 kg (186 lb)   SpO2 99%   BMI 31.93 kg/m²   Estimated body mass index is 31.93 kg/m² as calculated from the following:    Height as of this encounter: 162.6 cm (64\").    Weight as of this encounter: 84.4 kg (186 lb).               Physical Exam  Vitals reviewed.   Constitutional:       Appearance: Normal appearance. She is well-developed.   HENT:      Head: Normocephalic and atraumatic.      Right Ear: External ear normal.      Left Ear: External ear normal.      Nose: Nose normal.   Eyes:      Conjunctiva/sclera: Conjunctivae normal.      Pupils: Pupils are equal, round, and reactive to light.   Cardiovascular:      Rate and Rhythm: Normal rate.   Pulmonary:      Effort: Pulmonary effort is normal.      Breath sounds: Normal breath sounds.   Abdominal:      General: There is no distension.   Skin:     General: Skin is warm and dry.   Neurological:      Mental Status: She is alert and oriented to person, place, and time. Mental status is at baseline.   Psychiatric:         Mood and Affect: Mood and affect normal.         Behavior: Behavior normal.         Thought Content: Thought content normal.         Judgment: Judgment normal.      Result Review :  The following data was reviewed by: Robin Cavazos DO on 09/25/2023:  Common labs          4/6/2023    14:31 6/23/2023    12:17 6/23/2023    14:45 8/24/2023    14:32   Common Labs   Glucose 132    94    BUN 8   "  8    Creatinine 0.71    0.96    Sodium 140    140    Potassium 3.6    4.0    Chloride 104    106    Calcium 9.1    9.4    Albumin 3.9    3.8    Total Bilirubin 1.1    0.9    Alkaline Phosphatase 90    81    AST (SGOT) 20    27    ALT (SGPT) 16    19    WBC 8.13    8.92    Hemoglobin 15.4    15.8    Hematocrit 44.5    45.6    Platelets 217    224    Hemoglobin A1C  5.70      Microalbumin, Urine   1.2                    Assessment and Plan   Diagnoses and all orders for this visit:    1. Type 2 diabetes mellitus with hyperglycemia, without long-term current use of insulin (Primary)  -     Tirzepatide 2.5 MG/0.5ML solution pen-injector; Inject 0.5 mL under the skin into the appropriate area as directed 1 (One) Time Per Week.  Dispense: 2 mL; Refill: 2    2. Class 1 obesity due to excess calories without serious comorbidity with body mass index (BMI) of 34.0 to 34.9 in adult    3. Hypertension, unspecified type    4. Mixed hyperlipidemia      Patient doing well with Ozempic and has more nausea vomiting and difficulty with tolerating medication we will change to Mounjaro A1c goal less than 7 she is managing diet diabetes as tolerated otherwise needs labs rechecked every 3 to 6 months continue medicines otherwise as prescribed for chronic conditions and see back in couple months to review weight loss       Follow Up   Return in about 2 months (around 11/25/2023), or if symptoms worsen or fail to improve, for Recheck, Weight check.  Patient was given instructions and counseling regarding her condition or for health maintenance advice. Please see specific information pulled into the AVS if appropriate.

## 2023-10-18 ENCOUNTER — PATIENT MESSAGE (OUTPATIENT)
Dept: FAMILY MEDICINE CLINIC | Facility: CLINIC | Age: 68
End: 2023-10-18
Payer: OTHER GOVERNMENT

## 2023-10-18 DIAGNOSIS — E11.65 TYPE 2 DIABETES MELLITUS WITH HYPERGLYCEMIA, WITHOUT LONG-TERM CURRENT USE OF INSULIN: Chronic | ICD-10-CM

## 2023-11-21 DIAGNOSIS — R92.8 ABNORMAL MAMMOGRAM OF LEFT BREAST: Primary | ICD-10-CM

## 2023-12-11 ENCOUNTER — HOSPITAL ENCOUNTER (OUTPATIENT)
Dept: MAMMOGRAPHY | Facility: HOSPITAL | Age: 68
Discharge: HOME | End: 2023-12-11
Payer: MEDICARE

## 2023-12-11 ENCOUNTER — HOSPITAL ENCOUNTER (OUTPATIENT)
Dept: ULTRASOUND IMAGING | Facility: HOSPITAL | Age: 68
Discharge: HOME | End: 2023-12-11
Payer: MEDICARE

## 2023-12-11 DIAGNOSIS — R92.8 ABNORMAL MAMMOGRAM OF LEFT BREAST: ICD-10-CM

## 2023-12-11 PROCEDURE — 77065 DX MAMMO INCL CAD UNI: CPT

## 2023-12-11 PROCEDURE — G0279 TOMOSYNTHESIS, MAMMO: HCPCS

## 2023-12-11 PROCEDURE — 76642 ULTRASOUND BREAST LIMITED: CPT

## 2023-12-26 ENCOUNTER — TELEPHONE (OUTPATIENT)
Dept: GASTROENTEROLOGY | Facility: CLINIC | Age: 68
End: 2023-12-26
Payer: OTHER GOVERNMENT

## 2023-12-26 DIAGNOSIS — K21.9 GASTROESOPHAGEAL REFLUX DISEASE WITHOUT ESOPHAGITIS: ICD-10-CM

## 2023-12-26 RX ORDER — OMEPRAZOLE 40 MG/1
40 CAPSULE, DELAYED RELEASE ORAL DAILY
Qty: 90 CAPSULE | Refills: 3 | Status: SHIPPED | OUTPATIENT
Start: 2023-12-26

## 2023-12-26 NOTE — TELEPHONE ENCOUNTER
----- Message from Suyapa Barrera sent at 12/26/2023  8:30 AM EST -----  Regarding: Appointment canceled  Contact: [phone]  Appointment canceled for Suyapa Barrera ([id number])  Visit Type: NEW PATIENT  Date        Time      Length    Provider                  Department  2/7/2024     8:30 AM  15 mins.  Penny Liu         [facility]    Reason for Cancellation: Other

## 2024-01-09 ENCOUNTER — OFFICE VISIT (OUTPATIENT)
Dept: FAMILY MEDICINE CLINIC | Facility: CLINIC | Age: 69
End: 2024-01-09
Payer: MEDICARE

## 2024-01-09 VITALS
BODY MASS INDEX: 30.35 KG/M2 | HEIGHT: 64 IN | RESPIRATION RATE: 18 BRPM | HEART RATE: 78 BPM | SYSTOLIC BLOOD PRESSURE: 133 MMHG | DIASTOLIC BLOOD PRESSURE: 66 MMHG | TEMPERATURE: 97.8 F | WEIGHT: 177.8 LBS | OXYGEN SATURATION: 98 %

## 2024-01-09 DIAGNOSIS — Z00.00 ENCOUNTER FOR SUBSEQUENT ANNUAL WELLNESS VISIT (AWV) IN MEDICARE PATIENT: Primary | ICD-10-CM

## 2024-01-09 DIAGNOSIS — Z11.59 NEED FOR HEPATITIS C SCREENING TEST: ICD-10-CM

## 2024-01-09 DIAGNOSIS — E11.65 TYPE 2 DIABETES MELLITUS WITH HYPERGLYCEMIA, WITHOUT LONG-TERM CURRENT USE OF INSULIN: ICD-10-CM

## 2024-01-09 PROCEDURE — 1160F RVW MEDS BY RX/DR IN RCRD: CPT | Performed by: FAMILY MEDICINE

## 2024-01-09 PROCEDURE — 1159F MED LIST DOCD IN RCRD: CPT | Performed by: FAMILY MEDICINE

## 2024-01-09 PROCEDURE — 3075F SYST BP GE 130 - 139MM HG: CPT | Performed by: FAMILY MEDICINE

## 2024-01-09 PROCEDURE — 3078F DIAST BP <80 MM HG: CPT | Performed by: FAMILY MEDICINE

## 2024-01-09 PROCEDURE — G0439 PPPS, SUBSEQ VISIT: HCPCS | Performed by: FAMILY MEDICINE

## 2024-01-09 NOTE — PROGRESS NOTES
The ABCs of the Annual Wellness Visit  Subsequent Medicare Wellness Visit    Chief Complaint   Patient presents with    Medicare Wellness-subsequent     Had diarrhea and nausea last week.      Subjective    History of Present Illness:  Suyapa Barrera is a 68 y.o. female who presents for a Subsequent Medicare Wellness Visit.    The following portions of the patient's history were reviewed and   updated as appropriate: allergies, current medications, past family history, past medical history, past social history, past surgical history, and problem list.    Compared to one year ago, the patient feels her physical   health is the same.    Compared to one year ago, the patient feels her mental   health is the same.    Recent Hospitalizations:  She was not admitted to the hospital during the last year.       Current Medical Providers:  Patient Care Team:  Robin Cavazos DO as PCP - General (Family Medicine)  Amy Armstrong MD as Consulting Physician (Pulmonary Disease)    Outpatient Medications Prior to Visit   Medication Sig Dispense Refill    lisinopril (PRINIVIL,ZESTRIL) 30 MG tablet Take 1 tablet by mouth Daily. 90 tablet 3    omeprazole (priLOSEC) 40 MG capsule Take 1 capsule by mouth Daily. 90 capsule 3    simvastatin (ZOCOR) 40 MG tablet Take 1 tablet by mouth Every Night. 90 tablet 3    venlafaxine XR (Effexor XR) 150 MG 24 hr capsule Take 1 capsule by mouth Daily. 90 capsule 3    Tirzepatide 2.5 MG/0.5ML solution pen-injector Inject 0.5 mL under the skin into the appropriate area as directed 1 (One) Time Per Week. 6 mL 1     No facility-administered medications prior to visit.       No opioid medication identified on active medication list. I have reviewed chart for other potential  high risk medication/s and harmful drug interactions in the elderly.        Aspirin is not on active medication list.  Aspirin use is not indicated based on review of current medical condition/s. Risk of harm outweighs potential  "benefits.  .    Patient Active Problem List   Diagnosis    Depression with anxiety    Chronic pain of left wrist    Degenerative disc disease, lumbar    Esophageal reflux    HLD (hyperlipidemia)    HTN (hypertension)    Type 2 diabetes mellitus with hyperglycemia, without long-term current use of insulin    Class 1 obesity due to excess calories without serious comorbidity with body mass index (BMI) of 34.0 to 34.9 in adult    Obstructive sleep apnea, adult    Panic attacks    Vitamin D deficiency    Bicipital tenosynovitis    Full thickness rotator cuff tear     Advance Care Planning  Advance Directive is not on file.  ACP discussion was declined by the patient. Patient has an advance directive (not in EMR), copy requested.          Objective    Vitals:    01/09/24 0858   BP: 133/66   BP Location: Right arm   Patient Position: Sitting   Cuff Size: Adult   Pulse: 78   Resp: 18   Temp: 97.8 °F (36.6 °C)   TempSrc: Temporal   SpO2: 98%   Weight: 80.6 kg (177 lb 12.8 oz)   Height: 162.6 cm (64\")   PainSc: 0-No pain     Estimated body mass index is 30.52 kg/m² as calculated from the following:    Height as of this encounter: 162.6 cm (64\").    Weight as of this encounter: 80.6 kg (177 lb 12.8 oz).           Does the patient have evidence of cognitive impairment? No    Physical Exam  Vitals reviewed.   Constitutional:       Appearance: Normal appearance. She is well-developed.   HENT:      Head: Normocephalic and atraumatic.      Right Ear: External ear normal.      Left Ear: External ear normal.      Nose: Nose normal.   Eyes:      Conjunctiva/sclera: Conjunctivae normal.      Pupils: Pupils are equal, round, and reactive to light.   Cardiovascular:      Rate and Rhythm: Normal rate.   Pulmonary:      Effort: Pulmonary effort is normal.      Breath sounds: Normal breath sounds.   Abdominal:      General: There is no distension.   Skin:     General: Skin is warm and dry.   Neurological:      Mental Status: She is alert " and oriented to person, place, and time. Mental status is at baseline.   Psychiatric:         Mood and Affect: Mood and affect normal.         Behavior: Behavior normal.         Thought Content: Thought content normal.         Judgment: Judgment normal.                 HEALTH RISK ASSESSMENT    Smoking Status:  Social History     Tobacco Use   Smoking Status Never    Passive exposure: Never   Smokeless Tobacco Never     Alcohol Consumption:  Social History     Substance and Sexual Activity   Alcohol Use Yes    Alcohol/week: 1.0 standard drink of alcohol    Types: 1 Glasses of wine per week    Comment: Maybe one drink a month     Fall Risk Screen:    MONTSERRAT Fall Risk Assessment was completed, and patient is at LOW risk for falls.Assessment completed on:2024    Depression Screenin/9/2024     9:04 AM   PHQ-2/PHQ-9 Depression Screening   Little Interest or Pleasure in Doing Things 0-->not at all   Feeling Down, Depressed or Hopeless 0-->not at all   PHQ-9: Brief Depression Severity Measure Score 0       Health Habits and Functional and Cognitive Screenin/9/2024     9:02 AM   Functional & Cognitive Status   Do you have difficulty preparing food and eating? No   Do you have difficulty bathing yourself, getting dressed or grooming yourself? No   Do you have difficulty using the toilet? No   Do you have difficulty moving around from place to place? No   Do you have trouble with steps or getting out of a bed or a chair? No   Current Diet Well Balanced Diet   Dental Exam Up to date   Eye Exam Up to date   Exercise (times per week) 7 times per week   Current Exercises Include Yard Work;Walking;Gardening;House Cleaning   Do you need help using the phone?  No   Are you deaf or do you have serious difficulty hearing?  No   Do you need help to go to places out of walking distance? No   Do you need help shopping? No   Do you need help preparing meals?  No   Do you need help with housework?  No   Do you need  help with laundry? No   Do you need help taking your medications? No   Do you need help managing money? No   Do you ever drive or ride in a car without wearing a seat belt? No   Have you felt unusual stress, anger or loneliness in the last month? No   Who do you live with? Alone   If you need help, do you have trouble finding someone available to you? No   Have you been bothered in the last four weeks by sexual problems? No   Do you have difficulty concentrating, remembering or making decisions? No       Age-appropriate Screening Schedule:  Refer to the list below for future screening recommendations based on patient's age, sex and/or medical conditions. Orders for these recommended tests are listed in the plan section. The patient has been provided with a written plan.    Health Maintenance   Topic Date Due    COLORECTAL CANCER SCREENING  Never done    TDAP/TD VACCINES (1 - Tdap) Never done    HEPATITIS C SCREENING  Never done    LIPID PANEL  09/02/2023    HEMOGLOBIN A1C  12/23/2023    ZOSTER VACCINE (2 of 2) 01/01/2024    URINE MICROALBUMIN  06/23/2024    BMI FOLLOWUP  08/24/2024    DXA SCAN  08/30/2024    ANNUAL WELLNESS VISIT  01/09/2025    MAMMOGRAM  12/11/2025    COVID-19 Vaccine  Completed    INFLUENZA VACCINE  Completed    Pneumococcal Vaccine 65+  Completed    DIABETIC FOOT EXAM  Discontinued    DIABETIC EYE EXAM  Discontinued              Assessment & Plan   CMS Preventative Services Quick Reference  Risk Factors Identified During Encounter  reviewed  The above risks/problems have been discussed with the patient.  Follow up actions/plans if indicated are seen below in the Assessment/Plan Section.  Pertinent information has been shared with the patient in the After Visit Summary.    Diagnoses and all orders for this visit:    1. Encounter for subsequent annual wellness visit (AWV) in Medicare patient (Primary)    2. Type 2 diabetes mellitus with hyperglycemia, without long-term current use of insulin  -      Tirzepatide (MOUNJARO) 5 MG/0.5ML solution pen-injector pen; Inject 0.5 mL under the skin into the appropriate area as directed 1 (One) Time Per Week.  Dispense: 6 mL; Refill: 0  -     CBC (No Diff); Future  -     TSH+Free T4; Future  -     Comprehensive Metabolic Panel; Future  -     Lipid Panel; Future  -     Hemoglobin A1c; Future    3. Need for hepatitis C screening test  -     Hepatitis C antibody; Future      Reviewed AWV recommendations and ordered as appropriate, follow up annually for review, sooner if concerns    No depression, falls, dementia symptoms or other  Risk and home safety assessment good    Followed up on chronic conditions and ordered refills, appropriate monitoring labs additionally as needed every 6 months or sooner if indicated, controlled stable    Follow up at least every 3-6 months for chronic conditions and as scheduled with appropriate specialists as indicated otherwise        Follow Up:   Return if symptoms worsen or fail to improve, for Next scheduled follow up.     An After Visit Summary and PPPS were made available to the patient.

## 2024-01-10 ENCOUNTER — PATIENT ROUNDING (BHMG ONLY) (OUTPATIENT)
Dept: FAMILY MEDICINE CLINIC | Facility: CLINIC | Age: 69
End: 2024-01-10
Payer: OTHER GOVERNMENT

## 2024-01-10 NOTE — PROGRESS NOTES
A My-Chart message has been sent to the patient for PATIENT ROUNDING with [facility].

## 2024-01-26 ENCOUNTER — OFFICE VISIT (OUTPATIENT)
Dept: ORTHOPEDIC SURGERY | Facility: CLINIC | Age: 69
End: 2024-01-26
Payer: MEDICARE

## 2024-01-26 VITALS
DIASTOLIC BLOOD PRESSURE: 75 MMHG | HEIGHT: 64 IN | BODY MASS INDEX: 29.71 KG/M2 | OXYGEN SATURATION: 96 % | SYSTOLIC BLOOD PRESSURE: 113 MMHG | WEIGHT: 174 LBS | HEART RATE: 94 BPM

## 2024-01-26 DIAGNOSIS — G56.02 CARPAL TUNNEL SYNDROME ON LEFT: Primary | ICD-10-CM

## 2024-01-26 DIAGNOSIS — M79.671 RIGHT FOOT PAIN: ICD-10-CM

## 2024-01-26 RX ORDER — LIDOCAINE HYDROCHLORIDE 10 MG/ML
1 INJECTION, SOLUTION INFILTRATION; PERINEURAL
Status: COMPLETED | OUTPATIENT
Start: 2024-01-26 | End: 2024-01-26

## 2024-01-26 RX ORDER — TRIAMCINOLONE ACETONIDE 40 MG/ML
40 INJECTION, SUSPENSION INTRA-ARTICULAR; INTRAMUSCULAR
Status: COMPLETED | OUTPATIENT
Start: 2024-01-26 | End: 2024-01-26

## 2024-01-26 RX ADMIN — TRIAMCINOLONE ACETONIDE 40 MG: 40 INJECTION, SUSPENSION INTRA-ARTICULAR; INTRAMUSCULAR at 13:48

## 2024-01-26 RX ADMIN — LIDOCAINE HYDROCHLORIDE 1 ML: 10 INJECTION, SOLUTION INFILTRATION; PERINEURAL at 13:48

## 2024-01-26 NOTE — PROGRESS NOTES
"Chief Complaint  Follow-up of the Left Wrist     Subjective      Suyapa Barrera presents to [facility] for follow up of the left wrist. She has had carpal tunnel for awhile and injections in the past.  She has difficulty with [facility] and ; strength. Her last injection was 4/19/23.      Allergies   Allergen Reactions    Ozempic (0.25 Or 0.5 Mg-Dose) [Semaglutide(0.25 Or 0.5mg-Dos)] Shortness Of Breath and GI Intolerance    Azithromycin GI Intolerance        Social History     Socioeconomic History    Marital status: Single   Tobacco Use    Smoking status: Never     Passive exposure: Never    Smokeless tobacco: Never   Vaping Use    Vaping Use: Never used   Substance and Sexual Activity    Alcohol use: Yes     Alcohol/week: 1.0 standard drink of alcohol     Types: 1 Glasses of wine per week     Comment: Maybe one drink a month    Drug use: Never    Sexual activity: Not Currently     Partners: Male     Birth control/protection: Tubal ligation        I reviewed the patient's chief complaint, history of present illness, review of systems, past medical history, surgical history, family history, social history, medications, and allergy list.     Review of Systems     Constitutional: Denies fevers, chills, weight loss  Cardiovascular: Denies chest pain, shortness of breath  Skin: Denies rashes, acute skin changes  Neurologic: Denies headache, loss of consciousness        Vital Signs:   /75   Pulse 94   Ht 162.6 cm (64\")   Wt 78.9 kg (174 lb)   SpO2 96%   BMI 29.87 kg/m²          Physical Exam  General: Alert. No acute distress    Ortho Exam        LEFT WRIST Positive Compression testing/ Positive Tinels. NegativeFinkelsteins. Negative Perdomo's testing. Negative [facility] grind testing. Positive Phalens. Full ROM of the hand, fingers, elbow and wrist. Negative Triggering of the digit. Sensation grossly intact to light touch, median, radial and ulnar nerve. Positive AIN, PIN and ulnar nerve " motor function intact. Axillary nerve intact. Positive pulses.        Medium Joint LEFT CARPAL TUNNEL  Date/Time: 1/26/2024 1:48 PM  Consent given by: patient  Site marked: site marked  Timeout: Immediately prior to procedure a time out was called to verify the correct patient, procedure, equipment, support staff and site/side marked as required   Supporting Documentation  Indications: pain   Procedure Details  Location: wrist -   Preparation: Patient was prepped and draped in the usual sterile fashion  Needle size: 23 G  Medications administered: 1 mL lidocaine 1 %; 40 mg triamcinolone acetonide 40 MG/ML  Patient tolerance: patient tolerated the procedure well with no immediate complications            Imaging Results (Most Recent)       None             Result Review :          Assessment and Plan     Diagnoses and all orders for this visit:    1. Carpal tunnel syndrome on left (Primary)    2. Right foot pain    Other orders  -     Medium Joint Arthrocentesis  -     Medium Joint LEFT CARPAL TUNNEL        Discussed the treatment plan with the patient.     Discussed the risks and benefits of conservative measures. The patient expressed understanding and wished to proceed with a left wrist steroid injection.  She tolerated the injection well.     Dr Nunes referral for right foot pain.     Call or return if worsening symptoms.    Follow Up     PRN      Patient was given instructions and counseling regarding her condition or for health maintenance advice. Please see specific information pulled into the AVS if appropriate.     Scribed for Kris Marlow MD by Lalita Schneider MA.  01/26/24   13:42 EST      I have personally performed the services described in this document as scribed by the above individual and it is both accurate and complete. Kris Marlow MD 01/26/24

## 2024-03-15 ENCOUNTER — CLINICAL SUPPORT (OUTPATIENT)
Dept: FAMILY MEDICINE CLINIC | Facility: CLINIC | Age: 69
End: 2024-03-15
Payer: MEDICARE

## 2024-03-15 DIAGNOSIS — E11.65 TYPE 2 DIABETES MELLITUS WITH HYPERGLYCEMIA, WITHOUT LONG-TERM CURRENT USE OF INSULIN: ICD-10-CM

## 2024-03-15 DIAGNOSIS — E11.65 TYPE 2 DIABETES MELLITUS WITH HYPERGLYCEMIA, WITHOUT LONG-TERM CURRENT USE OF INSULIN: Primary | Chronic | ICD-10-CM

## 2024-03-15 LAB
EXPIRATION DATE: NORMAL
HBA1C MFR BLD: 5.3 % (ref 4.5–5.7)
Lab: NORMAL

## 2024-03-15 RX ORDER — TIRZEPATIDE 5 MG/.5ML
INJECTION, SOLUTION SUBCUTANEOUS
Qty: 6 ML | Refills: 3 | Status: SHIPPED | OUTPATIENT
Start: 2024-03-15

## 2024-04-15 DIAGNOSIS — F41.8 DEPRESSION WITH ANXIETY: Chronic | ICD-10-CM

## 2024-04-15 RX ORDER — VENLAFAXINE HYDROCHLORIDE 150 MG/1
150 CAPSULE, EXTENDED RELEASE ORAL DAILY
Qty: 90 CAPSULE | Refills: 3 | Status: SHIPPED | OUTPATIENT
Start: 2024-04-15

## 2024-05-02 DIAGNOSIS — E11.65 TYPE 2 DIABETES MELLITUS WITH HYPERGLYCEMIA, WITHOUT LONG-TERM CURRENT USE OF INSULIN: ICD-10-CM

## 2024-05-20 ENCOUNTER — OFFICE VISIT (OUTPATIENT)
Dept: PODIATRY | Facility: CLINIC | Age: 69
End: 2024-05-20
Payer: MEDICARE

## 2024-05-20 VITALS
TEMPERATURE: 98 F | OXYGEN SATURATION: 97 % | DIASTOLIC BLOOD PRESSURE: 75 MMHG | HEART RATE: 69 BPM | WEIGHT: 174 LBS | SYSTOLIC BLOOD PRESSURE: 126 MMHG | BODY MASS INDEX: 29.71 KG/M2 | HEIGHT: 64 IN

## 2024-05-20 DIAGNOSIS — M89.8X7 EXOSTOSIS OF BONE OF FOOT: ICD-10-CM

## 2024-05-20 DIAGNOSIS — M19.171 POST-TRAUMATIC OSTEOARTHRITIS OF RIGHT FOOT: Primary | ICD-10-CM

## 2024-05-21 NOTE — PROGRESS NOTES
[facility] - PODIATRY    Today's Date: 24    Patient Name: Suyapa Barrera  MRN: [id number]  CSN: [id number]  PCP: Robin Cavazos DO,  Referring Provider: Kris Marlow MD    SUBJECTIVE     Chief Complaint   Patient presents with    Right Foot - Establish Care, Pain     Lisfranc injury 3 yrs agolivbed in [facility] discussed surgery.   2yrs ago saw Dr Rodriguez advised everything looked fine recommended a brace which she could not wear.      HPI: Suyapa Barrera, a 69 y.o.female, presents to clinic.    Patient is a 69-year-old female presenting with right foot pain.  Patient states that she had a Lisfranc injury approximately 3 years ago in [place].  She followed up with an orthopedic surgeon there that told her that she likely would need surgery in the future.  She saw an outside physician approximately 2 years ago that said she did not need surgery.  She is having off-and-on pain to her foot but it is not consistent.  Patient states that the bump rubs in her shoe and causes her shortness.  She is here for further treatment.    Past Medical History:   Diagnosis Date    Arthritis 2023    Callus     Depression 2019    Diverticulosis 2016    Full thickness rotator cuff tear 2022    Hyperlipidemia 2011    Hypertension 2011    Plantar fasciitis 2017    Sleep apnea 2012    New sleep study to be performed on 3/20/2023    Type 2 diabetes mellitus with hyperglycemia, without long-term current use of insulin 2022     Past Surgical History:   Procedure Laterality Date    BREAST SURGERY  1998    Bilateral reduction    COLONOSCOPY  Dec 2016    EYE SURGERY  Aug 2019    Lasik    TUBAL ABDOMINAL LIGATION  Sep 1992     Family History   Problem Relation Age of Onset    Alcohol abuse Mother             Alcohol abuse Maternal Grandfather             Early death Maternal Grandfather          at 49 of Heart attack    Early death  Maternal Grandmother          at 42 of heart attack    Alcohol abuse Sister     Drug abuse Sister     Alcohol abuse Brother     Drug abuse Brother      Social History     Socioeconomic History    Marital status: Single   Tobacco Use    Smoking status: Never     Passive exposure: Never    Smokeless tobacco: Never   Vaping Use    Vaping status: Never Used   Substance and Sexual Activity    Alcohol use: Yes     Alcohol/week: 1.0 standard drink of alcohol     Types: 1 Glasses of wine per week     Comment: Maybe one drink a month    Drug use: Never    Sexual activity: Not Currently     Partners: Male     Birth control/protection: Tubal ligation     Allergies   Allergen Reactions    Ozempic (0.25 Or 0.5 Mg-Dose) [Semaglutide(0.25 Or 0.5mg-Dos)] Shortness Of Breath and GI Intolerance    Azithromycin GI Intolerance     Current Outpatient Medications   Medication Sig Dispense Refill    lisinopril (PRINIVIL,ZESTRIL) 30 MG tablet Take 1 tablet by mouth Daily. 90 tablet 3    omeprazole (priLOSEC) 40 MG capsule Take 1 capsule by mouth Daily. 90 capsule 3    simvastatin (ZOCOR) 40 MG tablet Take 1 tablet by mouth Every Night. 90 tablet 3    Tirzepatide (MOUNJARO) 7.5 MG/0.5ML solution pen-injector pen Inject 0.5 mL under the skin into the appropriate area as directed 1 (One) Time Per Week. 2 mL 2    venlafaxine XR (EFFEXOR-XR) 150 MG 24 hr capsule TAKE 1 CAPSULE DAILY 90 capsule 3     No current facility-administered medications for this visit.     Review of Systems   Constitutional: Negative.    HENT: Negative.     Eyes: Negative.    Respiratory: Negative.     Cardiovascular: Negative.    Gastrointestinal: Negative.    Endocrine: Negative.    Genitourinary: Negative.    Musculoskeletal:  Positive for arthralgias.   Skin: Negative.    Allergic/Immunologic: Negative.    Neurological: Negative.    Hematological: Negative.    Psychiatric/Behavioral: Negative.     All other systems reviewed and are negative.      OBJECTIVE      Vitals:    05/20/24 1510   BP: 126/75   Pulse: 69   Temp: 98 °F (36.7 °C)   SpO2: 97%       WBC   Date Value Ref Range Status   08/24/2023 8.92 3.40 - 10.80 10*3/mm3 Final     RBC   Date Value Ref Range Status   08/24/2023 5.06 3.77 - 5.28 10*6/mm3 Final     Hemoglobin   Date Value Ref Range Status   08/24/2023 15.8 12.0 - 15.9 g/dL Final     Hematocrit   Date Value Ref Range Status   08/24/2023 45.6 34.0 - 46.6 % Final     MCV   Date Value Ref Range Status   08/24/2023 90.1 79.0 - 97.0 fL Final     MCH   Date Value Ref Range Status   08/24/2023 31.2 26.6 - 33.0 pg Final     MCHC   Date Value Ref Range Status   08/24/2023 34.6 31.5 - 35.7 g/dL Final     RDW   Date Value Ref Range Status   08/24/2023 12.7 12.3 - 15.4 % Final     RDW-SD   Date Value Ref Range Status   08/24/2023 41.4 37.0 - 54.0 fl Final     MPV   Date Value Ref Range Status   08/24/2023 10.9 6.0 - 12.0 fL Final     Platelets   Date Value Ref Range Status   08/24/2023 224 140 - 450 10*3/mm3 Final     Neutrophil %   Date Value Ref Range Status   08/24/2023 71.8 42.7 - 76.0 % Final     Lymphocyte %   Date Value Ref Range Status   08/24/2023 20.5 19.6 - 45.3 % Final     Monocyte %   Date Value Ref Range Status   08/24/2023 5.3 5.0 - 12.0 % Final     Eosinophil %   Date Value Ref Range Status   08/24/2023 1.6 0.3 - 6.2 % Final     Basophil %   Date Value Ref Range Status   08/24/2023 0.7 0.0 - 1.5 % Final     Immature Grans %   Date Value Ref Range Status   08/24/2023 0.1 0.0 - 0.5 % Final     Neutrophils, Absolute   Date Value Ref Range Status   08/24/2023 6.41 1.70 - 7.00 10*3/mm3 Final     Lymphocytes, Absolute   Date Value Ref Range Status   08/24/2023 1.83 0.70 - 3.10 10*3/mm3 Final     Monocytes, Absolute   Date Value Ref Range Status   08/24/2023 0.47 0.10 - 0.90 10*3/mm3 Final     Eosinophils, Absolute   Date Value Ref Range Status   08/24/2023 0.14 0.00 - 0.40 10*3/mm3 Final     Basophils, Absolute   Date Value Ref Range Status   08/24/2023  0.06 0.00 - 0.20 10*3/mm3 Final     Immature Grans, Absolute   Date Value Ref Range Status   08/24/2023 0.01 0.00 - 0.05 10*3/mm3 Final     nRBC   Date Value Ref Range Status   08/24/2023 0.0 0.0 - 0.2 /100 WBC Final         Lab Results   Component Value Date    GLUCOSE 94 08/24/2023    BUN 8 08/24/2023    CREATININE 0.96 08/24/2023    BCR 8.3 08/24/2023    K 4.0 08/24/2023    CO2 24.0 08/24/2023    CALCIUM 9.4 08/24/2023    ALBUMIN 3.8 08/24/2023    LABIL2 1.3 (L) 04/27/2020    AST 27 08/24/2023    ALT 19 08/24/2023       Patient seen in no apparent distress.      PHYSICAL EXAM:     Foot/Ankle Exam    GENERAL  Appearance:  appears stated age  Orientation:  AAOx3  Affect:  appropriate  Gait:  unimpaired  Assistance:  independent  Right shoe gear: casual shoe  Left shoe gear: casual shoe    VASCULAR     Right Foot Vascularity   Normal vascular exam    Dorsalis pedis:  2+  Posterior tibial:  2+  Skin temperature:  warm  Edema grading:  None  CFT:  < 3 seconds  Pedal hair growth:  Present  Varicosities:  none     Left Foot Vascularity   Normal vascular exam    Dorsalis pedis:  2+  Posterior tibial:  2+  Skin temperature:  warm  Edema grading:  None  CFT:  < 3 seconds  Pedal hair growth:  Present  Varicosities:  none     NEUROLOGIC     Right Foot Neurologic   Normal sensation    Light touch sensation: normal  Vibratory sensation: normal  Hot/Cold sensation: normal  Protective Sensation using [place]-Arsenio Monofilament:   Sites intact: 10  Sites tested: 10     Left Foot Neurologic   Normal sensation    Light touch sensation: normal  Vibratory sensation: normal  Hot/Cold sensation:  normal  Protective Sensation using [place]-Arsenio Monofilament:   Sites intact: 10  Sites tested: 10    MUSCULOSKELETAL     Right Foot Musculoskeletal   Tenderness:  tarsometatarsal joints tenderness   (Exostosis dorsal foot)    MUSCLE STRENGTH     Right Foot Muscle Strength   Foot dorsiflexion:  4  Foot plantar flexion:  4  Foot  inversion:  4  Foot eversion:  4     Left Foot Muscle Strength   Foot dorsiflexion:  4  Foot plantar flexion:  4  Foot inversion:  4  Foot eversion:  4    RANGE OF MOTION     Right Foot Range of Motion   Foot and ankle ROM within normal limits    Foot eversion:  with pain  Foot inversion:  with pain     Left Foot Range of Motion   Foot and ankle ROM within normal limits      DERMATOLOGIC      Right Foot Dermatologic   Skin  Right foot skin is intact.      Left Foot Dermatologic   Skin  Left foot skin is intact.       RADIOLOGY:        No results found.    ASSESSMENT/PLAN     Diagnoses and all orders for this visit:    1. Post-traumatic osteoarthritis of right foot (Primary)    2. Exostosis of bone of foot      With posttraumatic arthritis to right tarsometatarsal joint.  Discussed treatments including orthotics shoe gear modification.  Discussed anti-inflammatory medicine, steroid injections.    Discussed with patient surgical options including tarsometatarsal joint fusion and exostectomy's.  As it does not cause patient pain consistently she would like to hold off at this time.    Patient to call the office if she would like to proceed or symptoms worsen.    Comprehensive lower extremity examination and evaluation was performed.    Discussed findings and treatment plan including risks, benefits, and treatment options with patient in detail. Patient agreed with treatment plan.    Medications and allergies reviewed.  Reviewed available lab values along with other pertinent labs.  These were discussed with the patient.    An After Visit Summary was printed and given to the patient at discharge, including (if requested) any available informative/educational handouts regarding diagnosis, treatment, or medications. All questions were answered to patient/family satisfaction. Should symptoms fail to improve or worsen they agree to call or return to clinic or to go to the Emergency Department. Discussed the importance of  following up with any needed screening tests/labs/specialist appointments and any requested follow-up recommended by me today. Importance of maintaining follow-up discussed and patient accepts that missed appointments can delay diagnosis and potentially lead to worsening of conditions.    Return if symptoms worsen or fail to improve., or sooner if acute issues arise.    This document has been electronically signed by Rudolph Juarez DPM on May 21, 2024 13:10 EDT

## 2024-06-03 ENCOUNTER — LAB (OUTPATIENT)
Dept: LAB | Facility: HOSPITAL | Age: 69
End: 2024-06-03
Payer: MEDICARE

## 2024-06-03 DIAGNOSIS — E11.65 TYPE 2 DIABETES MELLITUS WITH HYPERGLYCEMIA, WITHOUT LONG-TERM CURRENT USE OF INSULIN: ICD-10-CM

## 2024-06-03 DIAGNOSIS — I10 PRIMARY HYPERTENSION: ICD-10-CM

## 2024-06-03 DIAGNOSIS — E11.65 TYPE 2 DIABETES MELLITUS WITH HYPERGLYCEMIA, WITHOUT LONG-TERM CURRENT USE OF INSULIN: Primary | Chronic | ICD-10-CM

## 2024-06-03 DIAGNOSIS — I10 PRIMARY HYPERTENSION: Chronic | ICD-10-CM

## 2024-06-03 DIAGNOSIS — Z11.59 NEED FOR HEPATITIS C SCREENING TEST: ICD-10-CM

## 2024-06-03 LAB
ALBUMIN SERPL-MCNC: 4 G/DL (ref 3.5–5.2)
ALBUMIN UR-MCNC: 1.4 MG/DL
ALBUMIN/GLOB SERPL: 1.4 G/DL
ALP SERPL-CCNC: 77 U/L (ref 39–117)
ALT SERPL W P-5'-P-CCNC: 11 U/L (ref 1–33)
ANION GAP SERPL CALCULATED.3IONS-SCNC: 10 MMOL/L (ref 5–15)
AST SERPL-CCNC: 13 U/L (ref 1–32)
BACTERIA UR QL AUTO: ABNORMAL /HPF
BILIRUB SERPL-MCNC: 0.9 MG/DL (ref 0–1.2)
BILIRUB UR QL STRIP: NEGATIVE
BUN SERPL-MCNC: 14 MG/DL (ref 8–23)
BUN/CREAT SERPL: 20.3 (ref 7–25)
CALCIUM SPEC-SCNC: 9.2 MG/DL (ref 8.6–10.5)
CHLORIDE SERPL-SCNC: 105 MMOL/L (ref 98–107)
CHOLEST SERPL-MCNC: 186 MG/DL (ref 0–200)
CLARITY UR: ABNORMAL
CO2 SERPL-SCNC: 25 MMOL/L (ref 22–29)
COD CRY URNS QL: PRESENT /HPF
COLOR UR: ABNORMAL
CREAT SERPL-MCNC: 0.69 MG/DL (ref 0.57–1)
CREAT UR-MCNC: 271.4 MG/DL
DEPRECATED RDW RBC AUTO: 40.3 FL (ref 37–54)
EGFRCR SERPLBLD CKD-EPI 2021: 95.3 ML/MIN/1.73
ERYTHROCYTE [DISTWIDTH] IN BLOOD BY AUTOMATED COUNT: 12.2 % (ref 12.3–15.4)
GLOBULIN UR ELPH-MCNC: 2.8 GM/DL
GLUCOSE SERPL-MCNC: 93 MG/DL (ref 65–99)
GLUCOSE UR STRIP-MCNC: NEGATIVE MG/DL
HBA1C MFR BLD: 5.4 % (ref 4.8–5.6)
HCT VFR BLD AUTO: 46 % (ref 34–46.6)
HCV AB SER QL: NORMAL
HDLC SERPL-MCNC: 54 MG/DL (ref 40–60)
HGB BLD-MCNC: 15.3 G/DL (ref 12–15.9)
HGB UR QL STRIP.AUTO: NEGATIVE
HYALINE CASTS UR QL AUTO: ABNORMAL /LPF
KETONES UR QL STRIP: ABNORMAL
LDLC SERPL CALC-MCNC: 114 MG/DL (ref 0–100)
LDLC/HDLC SERPL: 2.09 {RATIO}
LEUKOCYTE ESTERASE UR QL STRIP.AUTO: ABNORMAL
MCH RBC QN AUTO: 30.1 PG (ref 26.6–33)
MCHC RBC AUTO-ENTMCNC: 33.3 G/DL (ref 31.5–35.7)
MCV RBC AUTO: 90.4 FL (ref 79–97)
MICROALBUMIN/CREAT UR: 5.2 MG/G (ref 0–29)
NITRITE UR QL STRIP: NEGATIVE
PH UR STRIP.AUTO: 6 [PH] (ref 5–8)
PLATELET # BLD AUTO: 179 10*3/MM3 (ref 140–450)
PMV BLD AUTO: 10.8 FL (ref 6–12)
POTASSIUM SERPL-SCNC: 4.3 MMOL/L (ref 3.5–5.2)
PROT SERPL-MCNC: 6.8 G/DL (ref 6–8.5)
PROT UR QL STRIP: ABNORMAL
RBC # BLD AUTO: 5.09 10*6/MM3 (ref 3.77–5.28)
RBC # UR STRIP: ABNORMAL /HPF
REF LAB TEST METHOD: ABNORMAL
SODIUM SERPL-SCNC: 140 MMOL/L (ref 136–145)
SP GR UR STRIP: >1.03 (ref 1–1.03)
SQUAMOUS #/AREA URNS HPF: ABNORMAL /HPF
T4 FREE SERPL-MCNC: 1.06 NG/DL (ref 0.92–1.68)
TRIGL SERPL-MCNC: 97 MG/DL (ref 0–150)
TSH SERPL DL<=0.05 MIU/L-ACNC: 2.27 UIU/ML (ref 0.27–4.2)
UROBILINOGEN UR QL STRIP: ABNORMAL
VLDLC SERPL-MCNC: 18 MG/DL (ref 5–40)
WBC # UR STRIP: ABNORMAL /HPF
WBC NRBC COR # BLD AUTO: 5.86 10*3/MM3 (ref 3.4–10.8)

## 2024-06-03 PROCEDURE — 82043 UR ALBUMIN QUANTITATIVE: CPT

## 2024-06-03 PROCEDURE — 84439 ASSAY OF FREE THYROXINE: CPT

## 2024-06-03 PROCEDURE — 84443 ASSAY THYROID STIM HORMONE: CPT

## 2024-06-03 PROCEDURE — 81001 URINALYSIS AUTO W/SCOPE: CPT

## 2024-06-03 PROCEDURE — 85027 COMPLETE CBC AUTOMATED: CPT

## 2024-06-03 PROCEDURE — 80053 COMPREHEN METABOLIC PANEL: CPT

## 2024-06-03 PROCEDURE — 83036 HEMOGLOBIN GLYCOSYLATED A1C: CPT

## 2024-06-03 PROCEDURE — 36415 COLL VENOUS BLD VENIPUNCTURE: CPT

## 2024-06-03 PROCEDURE — 86803 HEPATITIS C AB TEST: CPT

## 2024-06-03 PROCEDURE — 82570 ASSAY OF URINE CREATININE: CPT

## 2024-06-03 PROCEDURE — 80061 LIPID PANEL: CPT

## 2024-06-05 NOTE — PROGRESS NOTES
Labs overall are good   Continue to check at least every 6 months or so   A1c very well-controlled   Continue medicines as prescribed

## 2024-06-06 ENCOUNTER — OFFICE VISIT (OUTPATIENT)
Dept: FAMILY MEDICINE CLINIC | Facility: CLINIC | Age: 69
End: 2024-06-06
Payer: MEDICARE

## 2024-06-06 VITALS
OXYGEN SATURATION: 99 % | HEART RATE: 80 BPM | BODY MASS INDEX: 27.9 KG/M2 | DIASTOLIC BLOOD PRESSURE: 65 MMHG | RESPIRATION RATE: 16 BRPM | SYSTOLIC BLOOD PRESSURE: 117 MMHG | WEIGHT: 163.4 LBS | HEIGHT: 64 IN | TEMPERATURE: 98.2 F

## 2024-06-06 DIAGNOSIS — K21.9 GASTROESOPHAGEAL REFLUX DISEASE WITHOUT ESOPHAGITIS: ICD-10-CM

## 2024-06-06 DIAGNOSIS — E78.2 MIXED HYPERLIPIDEMIA: Chronic | ICD-10-CM

## 2024-06-06 DIAGNOSIS — I10 PRIMARY HYPERTENSION: Chronic | ICD-10-CM

## 2024-06-06 DIAGNOSIS — Z12.11 SCREENING FOR COLON CANCER: ICD-10-CM

## 2024-06-06 DIAGNOSIS — F41.8 DEPRESSION WITH ANXIETY: Chronic | ICD-10-CM

## 2024-06-06 DIAGNOSIS — E11.65 TYPE 2 DIABETES MELLITUS WITH HYPERGLYCEMIA, WITHOUT LONG-TERM CURRENT USE OF INSULIN: Primary | Chronic | ICD-10-CM

## 2024-06-06 DIAGNOSIS — E66.3 OVERWEIGHT (BMI 25.0-29.9): ICD-10-CM

## 2024-06-06 PROCEDURE — 3074F SYST BP LT 130 MM HG: CPT | Performed by: FAMILY MEDICINE

## 2024-06-06 PROCEDURE — 1125F AMNT PAIN NOTED PAIN PRSNT: CPT | Performed by: FAMILY MEDICINE

## 2024-06-06 PROCEDURE — 99214 OFFICE O/P EST MOD 30 MIN: CPT | Performed by: FAMILY MEDICINE

## 2024-06-06 PROCEDURE — 1160F RVW MEDS BY RX/DR IN RCRD: CPT | Performed by: FAMILY MEDICINE

## 2024-06-06 PROCEDURE — 3078F DIAST BP <80 MM HG: CPT | Performed by: FAMILY MEDICINE

## 2024-06-06 PROCEDURE — 3044F HG A1C LEVEL LT 7.0%: CPT | Performed by: FAMILY MEDICINE

## 2024-06-06 PROCEDURE — 1159F MED LIST DOCD IN RCRD: CPT | Performed by: FAMILY MEDICINE

## 2024-06-06 RX ORDER — LISINOPRIL 30 MG/1
30 TABLET ORAL EVERY OTHER DAY
Qty: 45 TABLET | Refills: 3 | Status: SHIPPED | OUTPATIENT
Start: 2024-06-06

## 2024-06-06 RX ORDER — VENLAFAXINE HYDROCHLORIDE 150 MG/1
150 CAPSULE, EXTENDED RELEASE ORAL DAILY
Qty: 90 CAPSULE | Refills: 3 | Status: SHIPPED | OUTPATIENT
Start: 2024-06-06

## 2024-06-06 RX ORDER — SIMVASTATIN 40 MG
40 TABLET ORAL NIGHTLY
Qty: 90 TABLET | Refills: 3 | Status: SHIPPED | OUTPATIENT
Start: 2024-06-06

## 2024-06-06 RX ORDER — OMEPRAZOLE 40 MG/1
40 CAPSULE, DELAYED RELEASE ORAL DAILY
Qty: 90 CAPSULE | Refills: 3 | Status: SHIPPED | OUTPATIENT
Start: 2024-06-06

## 2024-06-06 NOTE — PROGRESS NOTES
"Chief Complaint  Follow-up and Diabetes    Subjective          Suyapa Barrera presents to [facility]  Diabetes        Presents to follow-up on chronic conditions recently had labs overall are good A1c well-controlled was 5.4, LDL was 114 still mildly above goal but she is on the simvastatin 40 mg we will continue take lisinopril for blood pressure Effexor for anxiety and Mounjaro for diabetes    Objective   Vital Signs:   /65 (BP Location: Left arm, Patient Position: Sitting, Cuff Size: Adult)   Pulse 80   Temp 98.2 °F (36.8 °C)   Resp 16   Ht 162.6 cm (64\")   Wt 74.1 kg (163 lb 6.4 oz)   SpO2 99%   BMI 28.05 kg/m²            Physical Exam  Vitals reviewed.   Constitutional:       Appearance: Normal appearance. She is well-developed.   HENT:      Head: Normocephalic and atraumatic.      Right Ear: External ear normal.      Left Ear: External ear normal.      Nose: Nose normal.   Eyes:      Conjunctiva/sclera: Conjunctivae normal.      Pupils: Pupils are equal, round, and reactive to light.   Cardiovascular:      Rate and Rhythm: Normal rate.   Pulmonary:      Effort: Pulmonary effort is normal.      Breath sounds: Normal breath sounds.   Abdominal:      General: There is no distension.   Skin:     General: Skin is warm and dry.   Neurological:      Mental Status: She is alert and oriented to person, place, and time. Mental status is at baseline.   Psychiatric:         Mood and Affect: Mood and affect normal.         Behavior: Behavior normal.         Thought Content: Thought content normal.         Judgment: Judgment normal.          Result Review :   The following data was reviewed by: Robin Cavazos DO on 06/06/2024:  Common labs          8/24/2023    14:32 3/15/2024    09:44 6/3/2024    08:14   Common Labs   Glucose 94   93    BUN 8   14    Creatinine 0.96   0.69    Sodium 140   140    Potassium 4.0   4.3    Chloride 106   105    Calcium 9.4   9.2    Albumin 3.8   " 4.0    Total Bilirubin 0.9   0.9    Alkaline Phosphatase 81   77    AST (SGOT) 27   13    ALT (SGPT) 19   11    WBC 8.92   5.86    Hemoglobin 15.8   15.3    Hematocrit 45.6   46.0    Platelets 224   179    Total Cholesterol   186    Triglycerides   97    HDL Cholesterol   54    LDL Cholesterol    114    Hemoglobin A1C  5.3  5.40    Microalbumin, Urine   1.4                    Assessment and Plan    Diagnoses and all orders for this visit:    1. Type 2 diabetes mellitus with hyperglycemia, without long-term current use of insulin (Primary)  -     Hemoglobin A1c; Future  -     Comprehensive Metabolic Panel; Future  -     Lipid Panel; Future  -     Vitamin B12 & Folate; Future  -     Microalbumin / Creatinine Urine Ratio - Urine, Clean Catch; Future  -     CBC (No Diff); Future  -     TSH+Free T4; Future    2. Screening for colon cancer  -     Cologuard - Stool, Per Rectum; Future    3. Primary hypertension    4. Depression with anxiety    5. Gastroesophageal reflux disease without esophagitis    6. Mixed hyperlipidemia    7. Overweight (BMI 25.0-29.9)      Labs ordered for patient to be rechecked in the future    Blood pressure goal less than 140/90    Continue with weight loss doing well BMI under 30    Continue medicine as prescribed for hyperlipidemia and depression controlled    Follow-up in 6 months      Follow Up   Return in about 6 months (around 12/6/2024), or if symptoms worsen or fail to improve, for Next scheduled follow up, Recheck, Labs before.  Patient was given instructions and counseling regarding her condition or for health maintenance advice. Please see specific information pulled into the AVS if appropriate.     Transcribed from ambient dictation for Robin Cavazos DO by Robin Cavazos DO.  06/06/24   09:10 EDT

## 2024-06-20 DIAGNOSIS — K21.9 GASTROESOPHAGEAL REFLUX DISEASE WITHOUT ESOPHAGITIS: ICD-10-CM

## 2024-06-20 DIAGNOSIS — F41.8 DEPRESSION WITH ANXIETY: Chronic | ICD-10-CM

## 2024-06-20 RX ORDER — OMEPRAZOLE 40 MG/1
40 CAPSULE, DELAYED RELEASE ORAL DAILY
Qty: 90 CAPSULE | Refills: 3 | Status: SHIPPED | OUTPATIENT
Start: 2024-06-20

## 2024-06-20 RX ORDER — SIMVASTATIN 40 MG
40 TABLET ORAL NIGHTLY
Qty: 90 TABLET | Refills: 3 | Status: SHIPPED | OUTPATIENT
Start: 2024-06-20

## 2024-06-20 RX ORDER — LISINOPRIL 30 MG/1
30 TABLET ORAL EVERY OTHER DAY
Qty: 45 TABLET | Refills: 3 | Status: SHIPPED | OUTPATIENT
Start: 2024-06-20

## 2024-06-20 RX ORDER — VENLAFAXINE HYDROCHLORIDE 150 MG/1
150 CAPSULE, EXTENDED RELEASE ORAL DAILY
Qty: 90 CAPSULE | Refills: 3 | Status: SHIPPED | OUTPATIENT
Start: 2024-06-20

## 2024-06-26 ENCOUNTER — PATIENT MESSAGE (OUTPATIENT)
Dept: FAMILY MEDICINE CLINIC | Facility: CLINIC | Age: 69
End: 2024-06-26
Payer: OTHER GOVERNMENT

## 2024-06-26 DIAGNOSIS — R19.5 POSITIVE COLORECTAL CANCER SCREENING USING COLOGUARD TEST: Primary | ICD-10-CM

## 2024-06-26 NOTE — TELEPHONE ENCOUNTER
From: Suyapa Barrera  To: Robin Cavazos  Sent: 6/26/2024 11:39 AM EDT  Subject: Abnormal Results from Cologuard    Dr. Cavazos,  Since I had abnormal results from Cologuard, I will need a referral for a colonoscopy. Oh yay!   Thank you,  Suyapa Barrera

## 2024-07-17 ENCOUNTER — PREP FOR SURGERY (OUTPATIENT)
Dept: OTHER | Facility: HOSPITAL | Age: 69
End: 2024-07-17
Payer: OTHER GOVERNMENT

## 2024-07-17 ENCOUNTER — CLINICAL SUPPORT (OUTPATIENT)
Dept: GASTROENTEROLOGY | Facility: CLINIC | Age: 69
End: 2024-07-17
Payer: MEDICARE

## 2024-07-17 DIAGNOSIS — R19.5 POSITIVE COLORECTAL CANCER SCREENING USING COLOGUARD TEST: Primary | ICD-10-CM

## 2024-07-17 RX ORDER — POLYETHYLENE GLYCOL 3350, SODIUM SULFATE, POTASSIUM CHLORIDE, MAGNESIUM SULFATE, AND SODIUM CHLORIDE FOR ORAL SOLUTION 178.7-7.3G
1 KIT ORAL TAKE AS DIRECTED
Qty: 1 EACH | Refills: 0 | Status: SHIPPED | OUTPATIENT
Start: 2024-07-17

## 2024-07-17 NOTE — PROGRESS NOTES
Suyapa Barrrea  1955  69 y.o.    Reason for call: Positive Cologuard, last scope 2016 in [place]  Prep prescribed: Suflave  Prep instructions reviewed with patient and sent to patient via regular mail to the home address on file  Is the patient currently on any injectable or oral medications for weight loss or diabetes? Yes  Clearance needed? No  If yes, what clearance is needed? N/A  Clearance has been requested from   The patient has been scheduled for: Colonoscopy  Family history of colon cancer? No  If yes, indicate relative:   Tentative Procedure Date: 24  Family History   Problem Relation Age of Onset    Alcohol abuse Mother             Alcohol abuse Sister     Drug abuse Sister     Alcohol abuse Brother     Drug abuse Brother     Early death Maternal Grandmother          at 42 of heart attack    Alcohol abuse Maternal Grandfather             Early death Maternal Grandfather          at 49 of Heart attack    Colon cancer Neg Hx      Past Medical History:   Diagnosis Date    Arthritis 2023    Callus     Depression 2019    Diverticulosis 2016    Full thickness rotator cuff tear 2022    Hyperlipidemia 2011    Hypertension 2011    Plantar fasciitis 2017    Sleep apnea 2012    New sleep study to be performed on 3/20/2023    Type 2 diabetes mellitus with hyperglycemia, without long-term current use of insulin 2022     Allergies   Allergen Reactions    Ozempic (0.25 Or 0.5 Mg-Dose) [Semaglutide(0.25 Or 0.5mg-Dos)] Shortness Of Breath and GI Intolerance    Azithromycin GI Intolerance     Past Surgical History:   Procedure Laterality Date    BREAST SURGERY  1998    Bilateral reduction    COLONOSCOPY  Dec 2016    EYE SURGERY  Aug 2019    Lasik    TUBAL ABDOMINAL LIGATION  Sep 1992     Social History     Socioeconomic History    Marital status: Single   Tobacco Use    Smoking status: Never     Passive exposure: Never    Smokeless tobacco:  Never   Vaping Use    Vaping status: Never Used   Substance and Sexual Activity    Alcohol use: Yes     Alcohol/week: 1.0 standard drink of alcohol     Types: 1 Glasses of wine per week     Comment: Maybe one drink a month    Drug use: Never    Sexual activity: Not Currently     Partners: Male     Birth control/protection: Tubal ligation       Current Outpatient Medications:     lisinopril (PRINIVIL,ZESTRIL) 30 MG tablet, Take 1 tablet by mouth Every Other Day., Disp: 45 tablet, Rfl: 3    omeprazole (priLOSEC) 40 MG capsule, Take 1 capsule by mouth Daily., Disp: 90 capsule, Rfl: 3    simvastatin (ZOCOR) 40 MG tablet, Take 1 tablet by mouth Every Night., Disp: 90 tablet, Rfl: 3    Tirzepatide (MOUNJARO) 7.5 MG/0.5ML solution pen-injector pen, Inject 0.5 mL under the skin into the appropriate area as directed 1 (One) Time Per Week., Disp: 2 mL, Rfl: 2    venlafaxine XR (EFFEXOR-XR) 150 MG 24 hr capsule, Take 1 capsule by mouth Daily., Disp: 90 capsule, Rfl: 3

## 2024-07-22 ENCOUNTER — TELEPHONE (OUTPATIENT)
Dept: GASTROENTEROLOGY | Facility: CLINIC | Age: 69
End: 2024-07-22
Payer: OTHER GOVERNMENT

## 2024-07-23 NOTE — TELEPHONE ENCOUNTER
Caller: TATI KATHLEEN    Relationship to patient: SELF    Best call back number: [phone]    Chief complaint: NEEDS TO R/S COLONOSCOPY     Type of visit: COLONOSCOPY     Requested date: SOME TIME IN SEPTEMBER    If rescheduling, when is the original appointment: 8.28    Additional notes:  
SPOKE WITH PATIENT AND SHE WAS IN A MEETING, SHE WILL CALL BACK LATER TODAY    
Suyapa Barrera  1955    Patient requested to Reschedule their Colonoscopy. I have offered to reschedule this patient and patient has AGREED.     Patient has been rescheduled to 09.03.24.    Reason for cancelling/rescheduling: NONE GIVEN    This procedure was ordered by KIMBERLY Horne for an important reason. We want to inform you that there are risks associated with not proceeding with the procedure at this time such as a delay in diagnosis, risk of incurable disease, or cancer.    Patient plans to call us back to reschedule: N/A    Updated clearance needed?: NO    Is the patient currently on any injectable medications for weight loss or diabetes? NO    Patient verbalized understanding for all of the above information.   
Family

## 2024-08-26 NOTE — PRE-PROCEDURE INSTRUCTIONS
"Instructed on date and arrival time of 0730 Instructed that arrival time is not their procedure time but allows time to prepare for procedure.  Come to entrance \"C\". Must have  over age 18 to drive home.  May have two visitors; however, children under 12 must stay in waiting room.  Discussed clear liquid diet (no red or purple), bowel prep, and NPO.  May take medications as usual except for blood thinners, diabetic medications, and weight loss medications.  Verbalized understanding of instructions given.  Instructed to call for questions or concerns.  Hold Mounjaro for one week prior to procedure.  "

## 2024-08-30 ENCOUNTER — ANESTHESIA EVENT (OUTPATIENT)
Dept: GASTROENTEROLOGY | Facility: HOSPITAL | Age: 69
End: 2024-08-30
Payer: MEDICARE

## 2024-08-30 NOTE — ANESTHESIA PREPROCEDURE EVALUATION
Anesthesia Evaluation     Patient summary reviewed and Nursing notes reviewed   NPO Solid Status: > 8 hours  NPO Liquid Status: > 4 hours           Airway   Mallampati: II  TM distance: >3 FB  Neck ROM: full  No difficulty expected  Dental - normal exam     Pulmonary - normal exam    breath sounds clear to auscultation  (+) ,sleep apnea (no longer requires cpap)  Cardiovascular - normal exam  Exercise tolerance: good (4-7 METS)    ECG reviewed  Rhythm: regular  Rate: normal    (+) hypertension well controlled, hyperlipidemia      Neuro/Psych  (+) psychiatric history Depression and Anxiety  GI/Hepatic/Renal/Endo    (+) obesity, GERD well controlled, diabetes mellitus type 2 well controlled    Musculoskeletal     Abdominal    Substance History      OB/GYN          Other   arthritis,     ROS/Med Hx Other: Last dose Mounjaro- 08/21 per phone conversation    + cologuard    EKG 04/06/23: HR 81,   Sinus rhythm  Borderline T abnormalities, anterior leads                      Anesthesia Plan    ASA 3     general   total IV anesthesia  (Total IV Anesthesia    Patient understands anesthesia not responsible for dental damage.  )  intravenous induction     Anesthetic plan, risks, benefits, and alternatives have been provided, discussed and informed consent has been obtained with: patient.  Pre-procedure education provided  Plan discussed with CRNA.      CODE STATUS:

## 2024-09-03 ENCOUNTER — HOSPITAL ENCOUNTER (OUTPATIENT)
Facility: HOSPITAL | Age: 69
Setting detail: HOSPITAL OUTPATIENT SURGERY
Discharge: HOME | End: 2024-09-03
Attending: INTERNAL MEDICINE | Admitting: INTERNAL MEDICINE
Payer: MEDICARE

## 2024-09-03 ENCOUNTER — ANESTHESIA (OUTPATIENT)
Dept: GASTROENTEROLOGY | Facility: HOSPITAL | Age: 69
End: 2024-09-03
Payer: MEDICARE

## 2024-09-03 VITALS
HEART RATE: 66 BPM | OXYGEN SATURATION: 100 % | RESPIRATION RATE: 13 BRPM | WEIGHT: 158.73 LBS | SYSTOLIC BLOOD PRESSURE: 129 MMHG | DIASTOLIC BLOOD PRESSURE: 74 MMHG | TEMPERATURE: 97.1 F | BODY MASS INDEX: 27.25 KG/M2

## 2024-09-03 DIAGNOSIS — R19.5 POSITIVE COLORECTAL CANCER SCREENING USING COLOGUARD TEST: ICD-10-CM

## 2024-09-03 LAB — GLUCOSE BLDC GLUCOMTR-MCNC: 93 MG/DL (ref 70–99)

## 2024-09-03 PROCEDURE — 25810000003 LACTATED RINGERS PER 1000 ML

## 2024-09-03 PROCEDURE — 82948 REAGENT STRIP/BLOOD GLUCOSE: CPT

## 2024-09-03 PROCEDURE — 45385 COLONOSCOPY W/LESION REMOVAL: CPT | Performed by: INTERNAL MEDICINE

## 2024-09-03 PROCEDURE — 88305 TISSUE EXAM BY PATHOLOGIST: CPT | Performed by: INTERNAL MEDICINE

## 2024-09-03 PROCEDURE — 25010000002 PROPOFOL 10 MG/ML EMULSION

## 2024-09-03 PROCEDURE — 45390 COLONOSCOPY W/RESECTION: CPT | Performed by: INTERNAL MEDICINE

## 2024-09-03 DEVICE — DEV CLIP HEMO RESOLUTION360/ULTR 235CM 17MM STRL: Type: IMPLANTABLE DEVICE | Site: TRANSVERSE COLON | Status: FUNCTIONAL

## 2024-09-03 RX ORDER — SODIUM CHLORIDE, SODIUM LACTATE, POTASSIUM CHLORIDE, CALCIUM CHLORIDE 600; 310; 30; 20 MG/100ML; MG/100ML; MG/100ML; MG/100ML
30 INJECTION, SOLUTION INTRAVENOUS CONTINUOUS
Status: DISCONTINUED | OUTPATIENT
Start: 2024-09-03 | End: 2024-09-03 | Stop reason: HOSPADM

## 2024-09-03 RX ORDER — PROPOFOL 10 MG/ML
VIAL (ML) INTRAVENOUS AS NEEDED
Status: DISCONTINUED | OUTPATIENT
Start: 2024-09-03 | End: 2024-09-03 | Stop reason: SURG

## 2024-09-03 RX ORDER — LIDOCAINE HYDROCHLORIDE 20 MG/ML
INJECTION, SOLUTION EPIDURAL; INFILTRATION; INTRACAUDAL; PERINEURAL AS NEEDED
Status: DISCONTINUED | OUTPATIENT
Start: 2024-09-03 | End: 2024-09-03 | Stop reason: SURG

## 2024-09-03 RX ADMIN — SODIUM CHLORIDE, POTASSIUM CHLORIDE, SODIUM LACTATE AND CALCIUM CHLORIDE: 600; 310; 30; 20 INJECTION, SOLUTION INTRAVENOUS at 08:49

## 2024-09-03 RX ADMIN — LIDOCAINE HYDROCHLORIDE 100 MG: 20 INJECTION, SOLUTION INTRAVENOUS at 08:25

## 2024-09-03 RX ADMIN — SODIUM CHLORIDE, POTASSIUM CHLORIDE, SODIUM LACTATE AND CALCIUM CHLORIDE 30 ML/HR: 600; 310; 30; 20 INJECTION, SOLUTION INTRAVENOUS at 08:04

## 2024-09-03 RX ADMIN — PROPOFOL 80 MG: 10 INJECTION, EMULSION INTRAVENOUS at 08:25

## 2024-09-03 RX ADMIN — PROPOFOL 250 MCG/KG/MIN: 10 INJECTION, EMULSION INTRAVENOUS at 08:26

## 2024-09-03 NOTE — H&P
Pre Procedure History & Physical    Chief Complaint:   +cologuard    Subjective     HPI:   As above    Past Medical History:   Past Medical History:   Diagnosis Date    Arthritis 2023    Callus     Depression 2019    Diverticulosis 2016    Full thickness rotator cuff tear 2022    Hyperlipidemia 2011    Hypertension 2011    Plantar fasciitis 2017    Sleep apnea 2012    New sleep study to be performed on 3/20/2023    Type 2 diabetes mellitus with hyperglycemia, without long-term current use of insulin 2022       Past Surgical History:  Past Surgical History:   Procedure Laterality Date    ABDOMINOPLASTY      tummy tuck    BREAST SURGERY  1998    Bilateral reduction    COLONOSCOPY  Dec 2016    EYE SURGERY  Aug 2019    Lasik    TUBAL ABDOMINAL LIGATION  Sep 1992       Family History:  Family History   Problem Relation Age of Onset    Alcohol abuse Mother             Alcohol abuse Sister     Drug abuse Sister     Alcohol abuse Brother     Drug abuse Brother     Early death Maternal Grandmother          at 42 of heart attack    Alcohol abuse Maternal Grandfather             Early death Maternal Grandfather          at 49 of Heart attack    Colon cancer Neg Hx        Social History:   reports that she has never smoked. She has never been exposed to tobacco smoke. She has never used smokeless tobacco. She reports current alcohol use of about 1.0 standard drink of alcohol per week. She reports that she does not use drugs.    Medications:   Medications Prior to Admission   Medication Sig Dispense Refill Last Dose    lisinopril (PRINIVIL,ZESTRIL) 30 MG tablet Take 1 tablet by mouth Every Other Day. 45 tablet 3 2024    omeprazole (priLOSEC) 40 MG capsule Take 1 capsule by mouth Daily. 90 capsule 3 2024    simvastatin (ZOCOR) 40 MG tablet Take 1 tablet by mouth Every Night. 90 tablet 3 2024    venlafaxine XR (EFFEXOR-XR) 150 MG 24 hr capsule Take  1 capsule by mouth Daily. 90 capsule 3 9/2/2024    Tirzepatide (MOUNJARO) 7.5 MG/0.5ML solution pen-injector pen Inject 0.5 mL under the skin into the appropriate area as directed 1 (One) Time Per Week. 2 mL 2 8/20/2024       Allergies:  Ozempic (0.25 or 0.5 mg-dose) [semaglutide(0.25 or 0.5mg-dos)] and Azithromycin        Objective     Blood pressure 154/77, pulse 76, temperature 98 °F (36.7 °C), temperature source Temporal, resp. rate 16, weight 72 kg (158 lb 11.7 oz), SpO2 98%, not currently breastfeeding.    Physical Exam   Constitutional: Pt is oriented to person, place, and time and well-developed, well-nourished, and in no distress.   Mouth/Throat: Oropharynx is clear and moist.   Neck: Normal range of motion.   Cardiovascular: Normal rate, regular rhythm and normal heart sounds.    Pulmonary/Chest: Effort normal and breath sounds normal.   Abdominal: Soft. Nontender  Skin: Skin is warm and dry.   Psychiatric: Mood, memory, affect and judgment normal.     Assessment & Plan     Diagnosis:  +cologuard    Anticipated Surgical Procedure:  colonoscopy    The risks, benefits, and alternatives of this procedure have been discussed with the patient or the responsible party- the patient understands and agrees to proceed.

## 2024-09-03 NOTE — ANESTHESIA POSTPROCEDURE EVALUATION
Patient: Suyapa Barrera    Procedure Summary       Date: 09/03/24 Room / Location: [facility] ENDOSCOPY 2 / [facility] END[facility]COPY    Anesthesia Start: 0822 Anesthesia Stop: 0857    Procedure: COLONOSCOPY WITH COLD AND HOT SNARE POLYPECTOMIES, ELEVIEW INJECTION, CLIP APPLICATION X 1 Diagnosis:       Positive colorectal cancer screening using Cologuard test      (Positive colorectal cancer screening using Cologuard test [R19.5])    Surgeons: Fer Dale MD Provider: Lucy Ball CRNA    Anesthesia Type: general ASA Status: 3            Anesthesia Type: general    Vitals  Vitals Value Taken Time   /74 09/03/24 0914   Temp 36.2 °C (97.1 °F) 09/03/24 0854   Pulse 66 09/03/24 0914   Resp 13 09/03/24 0914   SpO2 100 % 09/03/24 0914           Post Anesthesia Care and Evaluation    Post-procedure mental status: acceptable.  Pain management: satisfactory to patient    Airway patency: patent  Anesthetic complications: No anesthetic complications    Cardiovascular status: acceptable  Respiratory status: acceptable  Hydration status: acceptable    Comments: Per chart review

## 2024-09-04 LAB
CYTO UR: NORMAL
LAB AP CASE REPORT: NORMAL
LAB AP CLINICAL INFORMATION: NORMAL
PATH REPORT.FINAL DX SPEC: NORMAL
PATH REPORT.GROSS SPEC: NORMAL

## 2024-09-11 ENCOUNTER — OFFICE VISIT (OUTPATIENT)
Dept: FAMILY MEDICINE CLINIC | Facility: CLINIC | Age: 69
End: 2024-09-11
Payer: MEDICARE

## 2024-09-11 VITALS
RESPIRATION RATE: 16 BRPM | SYSTOLIC BLOOD PRESSURE: 118 MMHG | TEMPERATURE: 98 F | WEIGHT: 162 LBS | HEART RATE: 80 BPM | DIASTOLIC BLOOD PRESSURE: 69 MMHG | OXYGEN SATURATION: 98 % | BODY MASS INDEX: 27.66 KG/M2 | HEIGHT: 64 IN

## 2024-09-11 DIAGNOSIS — F41.8 DEPRESSION WITH ANXIETY: Chronic | ICD-10-CM

## 2024-09-11 DIAGNOSIS — I10 HYPERTENSION, UNSPECIFIED TYPE: Chronic | ICD-10-CM

## 2024-09-11 DIAGNOSIS — R35.0 URINARY FREQUENCY: Primary | ICD-10-CM

## 2024-09-11 DIAGNOSIS — E11.65 TYPE 2 DIABETES MELLITUS WITH HYPERGLYCEMIA, WITHOUT LONG-TERM CURRENT USE OF INSULIN: Chronic | ICD-10-CM

## 2024-09-11 DIAGNOSIS — E66.3 OVERWEIGHT (BMI 25.0-29.9): ICD-10-CM

## 2024-09-11 DIAGNOSIS — R31.9 URINARY TRACT INFECTION WITH HEMATURIA, SITE UNSPECIFIED: ICD-10-CM

## 2024-09-11 DIAGNOSIS — N39.0 URINARY TRACT INFECTION WITH HEMATURIA, SITE UNSPECIFIED: ICD-10-CM

## 2024-09-11 PROBLEM — E66.811 CLASS 1 OBESITY DUE TO EXCESS CALORIES WITHOUT SERIOUS COMORBIDITY WITH BODY MASS INDEX (BMI) OF 34.0 TO 34.9 IN ADULT: Chronic | Status: RESOLVED | Noted: 2022-08-03 | Resolved: 2024-09-11

## 2024-09-11 PROBLEM — E66.09 CLASS 1 OBESITY DUE TO EXCESS CALORIES WITHOUT SERIOUS COMORBIDITY WITH BODY MASS INDEX (BMI) OF 34.0 TO 34.9 IN ADULT: Chronic | Status: RESOLVED | Noted: 2022-08-03 | Resolved: 2024-09-11

## 2024-09-11 LAB
BILIRUB BLD-MCNC: ABNORMAL MG/DL
CLARITY, POC: CLEAR
COLOR UR: ABNORMAL
EXPIRATION DATE: ABNORMAL
GLUCOSE UR STRIP-MCNC: ABNORMAL MG/DL
KETONES UR QL: ABNORMAL
LEUKOCYTE EST, POC: ABNORMAL
Lab: ABNORMAL
NITRITE UR-MCNC: POSITIVE MG/ML
PH UR: 5.5 [PH] (ref 5–8)
PROT UR STRIP-MCNC: ABNORMAL MG/DL
RBC # UR STRIP: ABNORMAL /UL
SP GR UR: 1.02 (ref 1–1.03)
UROBILINOGEN UR QL: ABNORMAL

## 2024-09-11 PROCEDURE — 3044F HG A1C LEVEL LT 7.0%: CPT | Performed by: NURSE PRACTITIONER

## 2024-09-11 PROCEDURE — 3074F SYST BP LT 130 MM HG: CPT | Performed by: NURSE PRACTITIONER

## 2024-09-11 PROCEDURE — 1160F RVW MEDS BY RX/DR IN RCRD: CPT | Performed by: NURSE PRACTITIONER

## 2024-09-11 PROCEDURE — 1159F MED LIST DOCD IN RCRD: CPT | Performed by: NURSE PRACTITIONER

## 2024-09-11 PROCEDURE — 87086 URINE CULTURE/COLONY COUNT: CPT | Performed by: NURSE PRACTITIONER

## 2024-09-11 PROCEDURE — 81003 URINALYSIS AUTO W/O SCOPE: CPT | Performed by: NURSE PRACTITIONER

## 2024-09-11 PROCEDURE — 99214 OFFICE O/P EST MOD 30 MIN: CPT | Performed by: NURSE PRACTITIONER

## 2024-09-11 PROCEDURE — 3078F DIAST BP <80 MM HG: CPT | Performed by: NURSE PRACTITIONER

## 2024-09-11 PROCEDURE — 1125F AMNT PAIN NOTED PAIN PRSNT: CPT | Performed by: NURSE PRACTITIONER

## 2024-09-11 RX ORDER — NITROFURANTOIN 25; 75 MG/1; MG/1
100 CAPSULE ORAL 2 TIMES DAILY
Qty: 14 CAPSULE | Refills: 0 | Status: SHIPPED | OUTPATIENT
Start: 2024-09-11 | End: 2024-09-18

## 2024-09-11 NOTE — ASSESSMENT & PLAN NOTE
Patient's (Body mass index is 27.81 kg/m².) indicates that they are overweight with health conditions that include hypertension, diabetes mellitus, and GERD . Weight is unchanged. BMI is above average; BMI management plan is completed. We discussed portion control and increasing exercise.

## 2024-09-11 NOTE — PATIENT INSTRUCTIONS
Urinary Tract Infection, Adult  A urinary tract infection (UTI) is an infection of any part of the urinary tract. The urinary tract includes:  The kidneys.  The ureters.  The bladder.  The urethra.  These organs make, store, and get rid of pee (urine) in the body.  What are the causes?  This infection is caused by germs (bacteria) in your genital area. These germs grow and cause swelling (inflammation) of your urinary tract.  What increases the risk?  The following factors may make you more likely to develop this condition:  Using a small, thin tube (catheter) to drain pee.  Not being able to control when you pee or poop (incontinence).  Being female. If you are female, these things can increase the risk:  Using these methods to prevent pregnancy:  A medicine that kills sperm (spermicide).  A device that blocks sperm (diaphragm).  Having low levels of a female hormone (estrogen).  Being pregnant.  You are more likely to develop this condition if:  You have genes that add to your risk.  You are sexually active.  You take antibiotic medicines.  You have trouble peeing because of:  A prostate that is bigger than normal, if you are male.  A blockage in the part of your body that drains pee from the bladder.  A kidney stone.  A nerve condition that affects your bladder.  Not getting enough to drink.  Not peeing often enough.  You have other conditions, such as:  Diabetes.  A weak disease-fighting system (immune system).  Sickle cell disease.  Gout.  Injury of the spine.  What are the signs or symptoms?  Symptoms of this condition include:  Needing to pee right away.  Peeing small amounts often.  Pain or burning when peeing.  Blood in the pee.  Pee that smells bad or not like normal.  Trouble peeing.  Pee that is cloudy.  Fluid coming from the vagina, if you are female.  Pain in the belly or lower back.  Other symptoms include:  Vomiting.  Not feeling hungry.  Feeling mixed up (confused). This may be the first symptom in  older adults.  Being tired and grouchy (irritable).  A fever.  Watery poop (diarrhea).  How is this treated?  Taking antibiotic medicine.  Taking other medicines.  Drinking enough water.  In some cases, you may need to see a specialist.  Follow these instructions at home:    Medicines  Take over-the-counter and prescription medicines only as told by your doctor.  If you were prescribed an antibiotic medicine, take it as told by your doctor. Do not stop taking it even if you start to feel better.  General instructions  Make sure you:  Pee until your bladder is empty.  Do not hold pee for a long time.  Empty your bladder after sex.  Wipe from front to back after peeing or pooping if you are a female. Use each tissue one time when you wipe.  Drink enough fluid to keep your pee pale yellow.  Keep all follow-up visits.  Contact a doctor if:  You do not get better after 1-2 days.  Your symptoms go away and then come back.  Get help right away if:  You have very bad back pain.  You have very bad pain in your lower belly.  You have a fever.  You have chills.  You feeling like you will vomit or you vomit.  Summary  A urinary tract infection (UTI) is an infection of any part of the urinary tract.  This condition is caused by germs in your genital area.  There are many risk factors for a UTI.  Treatment includes antibiotic medicines.  Drink enough fluid to keep your pee pale yellow.  This information is not intended to replace advice given to you by your health care provider. Make sure you discuss any questions you have with your health care provider.  Document Revised: 07/25/2021 Document Reviewed: 07/30/2021  Elsevier Patient Education © 2024 [employer]

## 2024-09-11 NOTE — PROGRESS NOTES
"Chief Complaint  Urinary Frequency (X4 days)    Subjective        Suyapa Barrera presents to [facility]  History of Present Illness  Pt presents c/o increased urgency/frequency, mild dysuria x 2-3 days. Denies fever, chills, abd pain, n/v/d, flank pain, SOB, chest pain, dizziness or other. Taking Azo to treat.     Reviewed all recent labs and medications.  Urinary Frequency   Associated symptoms include frequency and urgency.   Dysuria   This is a new problem. The current episode started in the past 7 days. The problem occurs rarely. The problem has been unchanged. The quality of the pain is described as aching. The pain is at a severity of 1/10. There has been no fever. The fever has been present for Less than 1 day. She is not sexually active. There is no history of pyelonephritis. Associated symptoms include frequency and urgency.       Objective   Vital Signs:  /69 (BP Location: Left arm, Patient Position: Sitting, Cuff Size: Adult)   Pulse 80   Temp 98 °F (36.7 °C)   Resp 16   Ht 162.6 cm (64\")   Wt 73.5 kg (162 lb)   SpO2 98%   BMI 27.81 kg/m²   Estimated body mass index is 27.81 kg/m² as calculated from the following:    Height as of this encounter: 162.6 cm (64\").    Weight as of this encounter: 73.5 kg (162 lb).          Physical Exam  Vitals reviewed.   Constitutional:       General: She is not in acute distress.     Appearance: Normal appearance.   HENT:      Head: Normocephalic.      Right Ear: Tympanic membrane normal.      Left Ear: Tympanic membrane normal.      Nose: Nose normal.      Mouth/Throat:      Pharynx: Oropharynx is clear. No posterior oropharyngeal erythema.   Eyes:      General: No scleral icterus.     Extraocular Movements: Extraocular movements intact.      Conjunctiva/sclera: Conjunctivae normal.      Pupils: Pupils are equal, round, and reactive to light.   Cardiovascular:      Rate and Rhythm: Normal rate and regular rhythm.      " Pulses: Normal pulses.      Heart sounds: Normal heart sounds.   Pulmonary:      Effort: Pulmonary effort is normal.      Breath sounds: Normal breath sounds.   Abdominal:      General: Bowel sounds are normal.      Palpations: Abdomen is soft.   Musculoskeletal:         General: Normal range of motion.      Cervical back: Neck supple.   Skin:     General: Skin is warm and dry.   Neurological:      Mental Status: She is alert and oriented to person, place, and time.   Psychiatric:         Mood and Affect: Mood normal.         Behavior: Behavior normal.         Thought Content: Thought content normal.         Judgment: Judgment normal.        Result Review :    Common labs          3/15/2024    09:44 6/3/2024    08:14   Common Labs   Glucose  93    BUN  14    Creatinine  0.69    Sodium  140    Potassium  4.3    Chloride  105    Calcium  9.2    Albumin  4.0    Total Bilirubin  0.9    Alkaline Phosphatase  77    AST (SGOT)  13    ALT (SGPT)  11    WBC  5.86    Hemoglobin  15.3    Hematocrit  46.0    Platelets  179    Total Cholesterol  186    Triglycerides  97    HDL Cholesterol  54    LDL Cholesterol   114    Hemoglobin A1C 5.3  5.40    Microalbumin, Urine  1.4      Data reviewed : Consultant notes gastro/colonoscopy           Assessment and Plan   Diagnoses and all orders for this visit:    1. Urinary frequency (Primary)  -     POCT urinalysis dipstick, automated    2. Urinary tract infection with hematuria, site unspecified  Comments:  macrobid 100mg PO bid x 7 days   urine culture sent   increase rest/clear fluids   proceed to ED if s/s worsen or become severe  Orders:  -     Urine Culture - Urine, Urine, Clean Catch; Future    3. Type 2 diabetes mellitus with hyperglycemia, without long-term current use of insulin  Assessment & Plan:  Controlled  Reviewed labs and medications   Refill sent   Continue medications as prescribed   T2DM diet  Reg exercise/activity   Reg DM foot and eye exams disc        4. Overweight  (BMI 25.0-29.9)  Assessment & Plan:  Patient's (Body mass index is 27.81 kg/m².) indicates that they are overweight with health conditions that include hypertension, diabetes mellitus, and GERD . Weight is unchanged. BMI is above average; BMI management plan is completed. We discussed portion control and increasing exercise.       5. Hypertension, unspecified type  Assessment & Plan:  Hypertension is improving with treatment.  Continue current treatment regimen.  Dietary sodium restriction.  Weight loss.  Regular aerobic exercise.  Blood pressure will be reassessed at the next regular appointment.        6. Depression with anxiety  Assessment & Plan:  Patient's depression is recurrent and is mild without psychosis. Their depression is currently active and the condition is improving with treatment. This will be reassessed at the next regular appointment. F/U as described:patient will continue current medication therapy.        Other orders  -     nitrofurantoin, macrocrystal-monohydrate, (Macrobid) 100 MG capsule; Take 1 capsule by mouth 2 (Two) Times a Day for 7 days.  Dispense: 14 capsule; Refill: 0             Follow Up   Return if symptoms worsen or fail to improve.  Patient was given instructions and counseling regarding her condition or for health maintenance advice. Please see specific information pulled into the AVS if appropriate.             Answers submitted by the patient for this visit:  Primary Reason for Visit (Submitted on 9/10/2024)  What is the primary reason for your visit?: Painful Urination

## 2024-09-13 LAB — BACTERIA SPEC AEROBE CULT: NO GROWTH

## 2024-09-18 ENCOUNTER — PATIENT MESSAGE (OUTPATIENT)
Dept: FAMILY MEDICINE CLINIC | Facility: CLINIC | Age: 69
End: 2024-09-18
Payer: OTHER GOVERNMENT

## 2024-09-18 DIAGNOSIS — G89.29 CHRONIC PAIN OF LEFT WRIST: Primary | ICD-10-CM

## 2024-09-18 DIAGNOSIS — R29.898 WEAKNESS OF WRIST: ICD-10-CM

## 2024-09-18 DIAGNOSIS — G56.01 CARPAL TUNNEL SYNDROME OF RIGHT WRIST: ICD-10-CM

## 2024-09-18 DIAGNOSIS — M25.532 CHRONIC PAIN OF LEFT WRIST: Primary | ICD-10-CM

## 2024-10-28 ENCOUNTER — TRANSCRIBE ORDERS (OUTPATIENT)
Dept: FAMILY MEDICINE CLINIC | Facility: CLINIC | Age: 69
End: 2024-10-28
Payer: OTHER GOVERNMENT

## 2024-10-28 ENCOUNTER — TELEPHONE (OUTPATIENT)
Dept: FAMILY MEDICINE CLINIC | Facility: CLINIC | Age: 69
End: 2024-10-28
Payer: OTHER GOVERNMENT

## 2024-10-28 DIAGNOSIS — Z12.31 SCREENING MAMMOGRAM FOR BREAST CANCER: Primary | ICD-10-CM

## 2024-10-28 NOTE — TELEPHONE ENCOUNTER
Please confirm she wants to change to lower dose doesn't come in lower mL    So does she want 2.5mg or 5mg, she is currently prescribed 7.5mg    Just making sure before I send

## 2024-10-28 NOTE — TELEPHONE ENCOUNTER
Patient Comment: 0.5 ML bothers my stomach. Please request .25 ML, three month supply, via [facility]. Thank you.

## 2024-10-28 NOTE — TELEPHONE ENCOUNTER
Pharmacy Name: [facility] - [facility] [address] [phone] [address] [place] [phone]      Reference Number (if applicable): [id number]    Pharmacy representative name: BRIAN    Pharmacy representative phone number: [phone] [place] [phone]    What medication are you calling in regards to: DANIELLE    What question does the pharmacy have: PREVIOUS SCRIPT WAS TO INJECT 5MG WEEKLY AND THIS ONE IS FOR 2.5MG PLEASE CLARIFY IF CURRENT SCRIPT IS VALID AND IF SO WHY.    PLEASE CALL PHARMACY TO ADVISE.     Who is the provider that prescribed the medication: DR. VILLATORO

## 2024-10-28 NOTE — TELEPHONE ENCOUNTER
Spoke with pharmacy and notified that pt requested the 2.5 script due to extreme nausea and stomach issues on the 5 mg.

## 2024-11-21 ENCOUNTER — LAB (OUTPATIENT)
Dept: LAB | Facility: HOSPITAL | Age: 69
End: 2024-11-21
Payer: MEDICARE

## 2024-11-21 DIAGNOSIS — E11.65 TYPE 2 DIABETES MELLITUS WITH HYPERGLYCEMIA, WITHOUT LONG-TERM CURRENT USE OF INSULIN: Chronic | ICD-10-CM

## 2024-11-21 LAB
ALBUMIN SERPL-MCNC: 3.8 G/DL (ref 3.5–5.2)
ALBUMIN UR-MCNC: <1.2 MG/DL
ALBUMIN/GLOB SERPL: 1.4 G/DL
ALP SERPL-CCNC: 86 U/L (ref 39–117)
ALT SERPL W P-5'-P-CCNC: 10 U/L (ref 1–33)
ANION GAP SERPL CALCULATED.3IONS-SCNC: 9.4 MMOL/L (ref 5–15)
AST SERPL-CCNC: 19 U/L (ref 1–32)
BILIRUB SERPL-MCNC: 0.7 MG/DL (ref 0–1.2)
BUN SERPL-MCNC: 17 MG/DL (ref 8–23)
BUN/CREAT SERPL: 20.2 (ref 7–25)
CALCIUM SPEC-SCNC: 9.2 MG/DL (ref 8.6–10.5)
CHLORIDE SERPL-SCNC: 105 MMOL/L (ref 98–107)
CHOLEST SERPL-MCNC: 180 MG/DL (ref 0–200)
CO2 SERPL-SCNC: 26.6 MMOL/L (ref 22–29)
CREAT SERPL-MCNC: 0.84 MG/DL (ref 0.57–1)
CREAT UR-MCNC: 175.3 MG/DL
DEPRECATED RDW RBC AUTO: 41.3 FL (ref 37–54)
EGFRCR SERPLBLD CKD-EPI 2021: 76.3 ML/MIN/1.73
ERYTHROCYTE [DISTWIDTH] IN BLOOD BY AUTOMATED COUNT: 12.5 % (ref 12.3–15.4)
FOLATE SERPL-MCNC: 5.49 NG/ML (ref 4.78–24.2)
GLOBULIN UR ELPH-MCNC: 2.8 GM/DL
GLUCOSE SERPL-MCNC: 87 MG/DL (ref 65–99)
HBA1C MFR BLD: 5.5 % (ref 4.8–5.6)
HCT VFR BLD AUTO: 44.2 % (ref 34–46.6)
HDLC SERPL-MCNC: 47 MG/DL (ref 40–60)
HGB BLD-MCNC: 14.1 G/DL (ref 12–15.9)
LDLC SERPL CALC-MCNC: 112 MG/DL (ref 0–100)
LDLC/HDLC SERPL: 2.34 {RATIO}
MCH RBC QN AUTO: 29.2 PG (ref 26.6–33)
MCHC RBC AUTO-ENTMCNC: 31.9 G/DL (ref 31.5–35.7)
MCV RBC AUTO: 91.5 FL (ref 79–97)
MICROALBUMIN/CREAT UR: NORMAL MG/G{CREAT}
PLATELET # BLD AUTO: 203 10*3/MM3 (ref 140–450)
PMV BLD AUTO: 10.7 FL (ref 6–12)
POTASSIUM SERPL-SCNC: 4.4 MMOL/L (ref 3.5–5.2)
PROT SERPL-MCNC: 6.6 G/DL (ref 6–8.5)
RBC # BLD AUTO: 4.83 10*6/MM3 (ref 3.77–5.28)
SODIUM SERPL-SCNC: 141 MMOL/L (ref 136–145)
T4 FREE SERPL-MCNC: 0.99 NG/DL (ref 0.92–1.68)
TRIGL SERPL-MCNC: 115 MG/DL (ref 0–150)
TSH SERPL DL<=0.05 MIU/L-ACNC: 2.13 UIU/ML (ref 0.27–4.2)
VIT B12 BLD-MCNC: 311 PG/ML (ref 211–946)
VLDLC SERPL-MCNC: 21 MG/DL (ref 5–40)
WBC NRBC COR # BLD AUTO: 7.21 10*3/MM3 (ref 3.4–10.8)

## 2024-11-21 PROCEDURE — 80053 COMPREHEN METABOLIC PANEL: CPT

## 2024-11-21 PROCEDURE — 82570 ASSAY OF URINE CREATININE: CPT

## 2024-11-21 PROCEDURE — 36415 COLL VENOUS BLD VENIPUNCTURE: CPT

## 2024-11-21 PROCEDURE — 80061 LIPID PANEL: CPT

## 2024-11-21 PROCEDURE — 82043 UR ALBUMIN QUANTITATIVE: CPT

## 2024-11-21 PROCEDURE — 84439 ASSAY OF FREE THYROXINE: CPT

## 2024-11-21 PROCEDURE — 82746 ASSAY OF FOLIC ACID SERUM: CPT

## 2024-11-21 PROCEDURE — 82607 VITAMIN B-12: CPT

## 2024-11-21 PROCEDURE — 83036 HEMOGLOBIN GLYCOSYLATED A1C: CPT

## 2024-11-21 PROCEDURE — 84443 ASSAY THYROID STIM HORMONE: CPT

## 2024-11-21 PROCEDURE — 85027 COMPLETE CBC AUTOMATED: CPT

## 2024-12-12 ENCOUNTER — OFFICE VISIT (OUTPATIENT)
Dept: FAMILY MEDICINE CLINIC | Facility: CLINIC | Age: 69
End: 2024-12-12
Payer: MEDICARE

## 2024-12-12 VITALS
OXYGEN SATURATION: 97 % | BODY MASS INDEX: 28.27 KG/M2 | DIASTOLIC BLOOD PRESSURE: 76 MMHG | RESPIRATION RATE: 18 BRPM | WEIGHT: 165.6 LBS | TEMPERATURE: 97.7 F | SYSTOLIC BLOOD PRESSURE: 137 MMHG | HEIGHT: 64 IN | HEART RATE: 76 BPM

## 2024-12-12 DIAGNOSIS — I10 PRIMARY HYPERTENSION: Chronic | ICD-10-CM

## 2024-12-12 DIAGNOSIS — M54.2 CERVICALGIA: Chronic | ICD-10-CM

## 2024-12-12 DIAGNOSIS — N30.00 ACUTE CYSTITIS WITHOUT HEMATURIA: ICD-10-CM

## 2024-12-12 DIAGNOSIS — E66.3 OVERWEIGHT (BMI 25.0-29.9): Chronic | ICD-10-CM

## 2024-12-12 DIAGNOSIS — E78.2 MIXED HYPERLIPIDEMIA: Chronic | ICD-10-CM

## 2024-12-12 DIAGNOSIS — E11.65 TYPE 2 DIABETES MELLITUS WITH HYPERGLYCEMIA, WITHOUT LONG-TERM CURRENT USE OF INSULIN: Chronic | ICD-10-CM

## 2024-12-12 DIAGNOSIS — F43.10 PTSD (POST-TRAUMATIC STRESS DISORDER): Primary | Chronic | ICD-10-CM

## 2024-12-12 PROCEDURE — 3078F DIAST BP <80 MM HG: CPT | Performed by: FAMILY MEDICINE

## 2024-12-12 PROCEDURE — 3044F HG A1C LEVEL LT 7.0%: CPT | Performed by: FAMILY MEDICINE

## 2024-12-12 PROCEDURE — 1125F AMNT PAIN NOTED PAIN PRSNT: CPT | Performed by: FAMILY MEDICINE

## 2024-12-12 PROCEDURE — 1159F MED LIST DOCD IN RCRD: CPT | Performed by: FAMILY MEDICINE

## 2024-12-12 PROCEDURE — 1160F RVW MEDS BY RX/DR IN RCRD: CPT | Performed by: FAMILY MEDICINE

## 2024-12-12 PROCEDURE — G2211 COMPLEX E/M VISIT ADD ON: HCPCS | Performed by: FAMILY MEDICINE

## 2024-12-12 PROCEDURE — 99214 OFFICE O/P EST MOD 30 MIN: CPT | Performed by: FAMILY MEDICINE

## 2024-12-12 PROCEDURE — 3075F SYST BP GE 130 - 139MM HG: CPT | Performed by: FAMILY MEDICINE

## 2024-12-12 RX ORDER — NITROFURANTOIN 25; 75 MG/1; MG/1
100 CAPSULE ORAL 2 TIMES DAILY
Qty: 14 CAPSULE | Refills: 0 | Status: SHIPPED | OUTPATIENT
Start: 2024-12-12 | End: 2024-12-19

## 2024-12-12 RX ORDER — PRAZOSIN HYDROCHLORIDE 2 MG/1
CAPSULE ORAL
Qty: 25 CAPSULE | Refills: 0 | Status: SHIPPED | OUTPATIENT
Start: 2024-12-12 | End: 2024-12-26 | Stop reason: SDUPTHER

## 2024-12-12 NOTE — PROGRESS NOTES
"Chief Complaint  Diabetes and Follow-up (Pt states no concerns at this time. )    Subjective          Suyapa Barrera presents to [facility]  Diabetes            Objective   Vital Signs:   /76 (BP Location: Left arm, Patient Position: Sitting, Cuff Size: Adult)   Pulse 76   Temp 97.7 °F (36.5 °C)   Resp 18   Ht 162.6 cm (64\")   Wt 75.1 kg (165 lb 9.6 oz)   SpO2 97%   BMI 28.43 kg/m²            Physical Exam  Vitals reviewed.   Constitutional:       Appearance: Normal appearance. She is well-developed.   HENT:      Head: Normocephalic and atraumatic.      Right Ear: External ear normal.      Left Ear: External ear normal.      Nose: Nose normal.   Eyes:      Conjunctiva/sclera: Conjunctivae normal.      Pupils: Pupils are equal, round, and reactive to light.   Cardiovascular:      Rate and Rhythm: Normal rate.   Pulmonary:      Effort: Pulmonary effort is normal.      Breath sounds: Normal breath sounds.   Abdominal:      General: There is no distension.   Skin:     General: Skin is warm and dry.   Neurological:      Mental Status: She is alert and oriented to person, place, and time. Mental status is at baseline.   Psychiatric:         Mood and Affect: Mood and affect normal.         Behavior: Behavior normal.         Thought Content: Thought content normal.         Judgment: Judgment normal.          Result Review :   The following data was reviewed by: Robin Cavazos DO on 12/12/2024:  Common labs          3/15/2024    09:44 6/3/2024    08:14 11/21/2024    07:55   Common Labs   Glucose  93  87    BUN  14  17    Creatinine  0.69  0.84    Sodium  140  141    Potassium  4.3  4.4    Chloride  105  105    Calcium  9.2  9.2    Albumin  4.0  3.8    Total Bilirubin  0.9  0.7    Alkaline Phosphatase  77  86    AST (SGOT)  13  19    ALT (SGPT)  11  10    WBC  5.86  7.21    Hemoglobin  15.3  14.1    Hematocrit  46.0  44.2    Platelets  179  203    Total Cholesterol  186  180  "   Triglycerides  97  115    HDL Cholesterol  54  47    LDL Cholesterol   114  112    Hemoglobin A1C 5.3  5.40  5.50    Microalbumin, Urine  1.4  <1.2                    Assessment and Plan    Diagnoses and all orders for this visit:    1. PTSD (post-traumatic stress disorder) (Primary)  -     prazosin (MINIPRESS) 2 MG capsule; Take 1 capsule by mouth Every Night for 5 days, THEN 2 capsules Every Night for 10 days.  Dispense: 25 capsule; Refill: 0    2. Cervicalgia    3. Overweight (BMI 25.0-29.9)    4. Type 2 diabetes mellitus with hyperglycemia, without long-term current use of insulin    5. Primary hypertension    6. Mixed hyperlipidemia    7. Acute cystitis without hematuria    Other orders  -     nitrofurantoin, macrocrystal-monohydrate, (Macrobid) 100 MG capsule; Take 1 capsule by mouth 2 (Two) Times a Day for 7 days.  Dispense: 14 capsule; Refill: 0      Renew prazosin for PTSD, can increase over time to help with sleep and nightmares if occurring     Discussed exercises and stretches to use for neck pain, consider x-ray further evaluation if worse no better    Continue medicine Mounjaro for weight management and diabetes goal BMI under 28 goal A1c less than 7 continue to monitor follow-up at least every 3 to 6 months    Macrobid prescribed for UTI symptoms has been sporadic and intermittent    Continue lisinopril for blood pressure at goal less than 140/90    Continue cholesterol medicine as prescribed recheck lipid panel every 6 months      Follow Up   Return in 6 months (on 6/12/2025), or if symptoms worsen or fail to improve, for Next scheduled follow up, Recheck, Labs before.  Patient was given instructions and counseling regarding her condition or for health maintenance advice. Please see specific information pulled into the AVS if appropriate.     Transcribed from ambient dictation for Robin Cavazos DO by Robin Cavazos DO.  12/12/24   08:44 EST

## 2024-12-13 ENCOUNTER — PATIENT ROUNDING (BHMG ONLY) (OUTPATIENT)
Dept: FAMILY MEDICINE CLINIC | Facility: CLINIC | Age: 69
End: 2024-12-13
Payer: OTHER GOVERNMENT

## 2024-12-23 PROBLEM — N30.00 ACUTE CYSTITIS WITHOUT HEMATURIA: Status: ACTIVE | Noted: 2024-12-23

## 2024-12-24 ENCOUNTER — HOSPITAL ENCOUNTER (OUTPATIENT)
Dept: MAMMOGRAPHY | Facility: HOSPITAL | Age: 69
Discharge: HOME | End: 2024-12-24
Admitting: FAMILY MEDICINE
Payer: MEDICARE

## 2024-12-24 DIAGNOSIS — Z12.31 SCREENING MAMMOGRAM FOR BREAST CANCER: ICD-10-CM

## 2024-12-24 PROCEDURE — 77063 BREAST TOMOSYNTHESIS BI: CPT

## 2024-12-24 PROCEDURE — 77067 SCR MAMMO BI INCL CAD: CPT

## 2024-12-26 ENCOUNTER — PATIENT MESSAGE (OUTPATIENT)
Dept: FAMILY MEDICINE CLINIC | Facility: CLINIC | Age: 69
End: 2024-12-26
Payer: OTHER GOVERNMENT

## 2024-12-26 DIAGNOSIS — F43.10 PTSD (POST-TRAUMATIC STRESS DISORDER): Chronic | ICD-10-CM

## 2024-12-26 RX ORDER — PRAZOSIN HYDROCHLORIDE 2 MG/1
4 CAPSULE ORAL NIGHTLY
Qty: 180 CAPSULE | Refills: 3 | Status: SHIPPED | OUTPATIENT
Start: 2024-12-26

## 2025-01-07 ENCOUNTER — FLU SHOT (OUTPATIENT)
Dept: FAMILY MEDICINE CLINIC | Facility: CLINIC | Age: 70
End: 2025-01-07
Payer: MEDICARE

## 2025-01-07 DIAGNOSIS — Z23 NEED FOR INFLUENZA VACCINATION: Primary | ICD-10-CM

## 2025-02-12 ENCOUNTER — PATIENT MESSAGE (OUTPATIENT)
Dept: FAMILY MEDICINE CLINIC | Facility: CLINIC | Age: 70
End: 2025-02-12
Payer: OTHER GOVERNMENT

## 2025-02-12 DIAGNOSIS — K21.9 GASTROESOPHAGEAL REFLUX DISEASE WITHOUT ESOPHAGITIS: ICD-10-CM

## 2025-02-13 RX ORDER — OMEPRAZOLE 40 MG/1
40 CAPSULE, DELAYED RELEASE ORAL DAILY
Qty: 90 CAPSULE | Refills: 3 | Status: SHIPPED | OUTPATIENT
Start: 2025-02-13

## 2025-02-13 RX ORDER — SIMVASTATIN 40 MG
40 TABLET ORAL NIGHTLY
Qty: 90 TABLET | Refills: 3 | Status: SHIPPED | OUTPATIENT
Start: 2025-02-13

## 2025-03-31 ENCOUNTER — TRANSCRIBE ORDERS (OUTPATIENT)
Dept: PHYSICAL THERAPY | Facility: CLINIC | Age: 70
End: 2025-03-31
Payer: MEDICARE

## 2025-03-31 DIAGNOSIS — M18.9 OSTEOARTHRITIS OF CARPOMETACARPAL (CMC) JOINT OF THUMB, UNSPECIFIED LATERALITY, UNSPECIFIED OSTEOARTHRITIS TYPE: Primary | ICD-10-CM

## 2025-05-01 ENCOUNTER — OFFICE VISIT (OUTPATIENT)
Dept: FAMILY MEDICINE CLINIC | Facility: CLINIC | Age: 70
End: 2025-05-01
Payer: MEDICARE

## 2025-05-01 ENCOUNTER — ANCILLARY ORDERS (OUTPATIENT)
Dept: GENERAL RADIOLOGY | Facility: HOSPITAL | Age: 70
End: 2025-05-01
Payer: MEDICARE

## 2025-05-01 ENCOUNTER — HOSPITAL ENCOUNTER (OUTPATIENT)
Dept: GENERAL RADIOLOGY | Facility: HOSPITAL | Age: 70
Discharge: HOME | End: 2025-05-01
Payer: MEDICARE

## 2025-05-01 VITALS
OXYGEN SATURATION: 100 % | TEMPERATURE: 98.6 F | WEIGHT: 175 LBS | BODY MASS INDEX: 29.88 KG/M2 | HEIGHT: 64 IN | HEART RATE: 76 BPM | DIASTOLIC BLOOD PRESSURE: 74 MMHG | SYSTOLIC BLOOD PRESSURE: 128 MMHG

## 2025-05-01 DIAGNOSIS — M43.6 TORTICOLLIS: ICD-10-CM

## 2025-05-01 DIAGNOSIS — R91.1 LUNG NODULE SEEN ON IMAGING STUDY: ICD-10-CM

## 2025-05-01 DIAGNOSIS — M54.50 LOW BACK PAIN, NON-SPECIFIC: ICD-10-CM

## 2025-05-01 DIAGNOSIS — M54.9 CHRONIC MIDLINE BACK PAIN, UNSPECIFIED BACK LOCATION: ICD-10-CM

## 2025-05-01 DIAGNOSIS — M54.9 CHRONIC MIDLINE BACK PAIN, UNSPECIFIED BACK LOCATION: Primary | ICD-10-CM

## 2025-05-01 DIAGNOSIS — G89.29 CHRONIC MIDLINE BACK PAIN, UNSPECIFIED BACK LOCATION: ICD-10-CM

## 2025-05-01 DIAGNOSIS — M54.50 LOW BACK PAIN, NON-SPECIFIC: Primary | ICD-10-CM

## 2025-05-01 DIAGNOSIS — G89.29 CHRONIC MIDLINE BACK PAIN, UNSPECIFIED BACK LOCATION: Primary | ICD-10-CM

## 2025-05-01 PROCEDURE — 72070 X-RAY EXAM THORAC SPINE 2VWS: CPT

## 2025-05-01 PROCEDURE — 3078F DIAST BP <80 MM HG: CPT

## 2025-05-01 PROCEDURE — 72100 X-RAY EXAM L-S SPINE 2/3 VWS: CPT

## 2025-05-01 PROCEDURE — 72040 X-RAY EXAM NECK SPINE 2-3 VW: CPT

## 2025-05-01 PROCEDURE — 1170F FXNL STATUS ASSESSED: CPT

## 2025-05-01 PROCEDURE — 3074F SYST BP LT 130 MM HG: CPT

## 2025-05-01 PROCEDURE — 99213 OFFICE O/P EST LOW 20 MIN: CPT

## 2025-05-01 PROCEDURE — 1125F AMNT PAIN NOTED PAIN PRSNT: CPT

## 2025-05-01 RX ORDER — HYDROCODONE BITARTRATE AND ACETAMINOPHEN 5; 325 MG/1; MG/1
1 TABLET ORAL NIGHTLY
COMMUNITY
Start: 2025-04-28

## 2025-05-01 RX ORDER — NAPROXEN 500 MG/1
500 TABLET ORAL 2 TIMES DAILY WITH MEALS
COMMUNITY
Start: 2025-04-28

## 2025-05-01 RX ORDER — CEPHALEXIN 500 MG/1
500 CAPSULE ORAL 2 TIMES DAILY
COMMUNITY
Start: 2025-04-28

## 2025-05-01 NOTE — PROGRESS NOTES
Chief Complaint     Back Pain (Fell 4yrs ago onto concrete ), Leg Pain (Whenever standing legs will be tingling or burning, sometimes needs to manually move leg on left to get moving ), and Torticollis (Wakes up that way every morning )    Patient or patient representative verbalized consent for the use of Ambient Listening during the visit with  KIMBERLY Arevalo for chart documentation. 5/2/2025  16:16 EDT    History of Present Illness     Suyapa Barrera is a 70 y.o. female who presents to [facility] .    History of Present Illness  The patient presents for evaluation of back pain.    Persistent back pain has been experienced, progressively worsening to the point of significant discomfort. Approximately 4 to 5 years ago, a fall occurred on a concrete surface in [place]. Radiographic imaging at that time revealed no fractures, but the back has not been the same since. A massage therapist recently suggested the possibility of scoliosis or a similar condition. Over the past 3 months, a stiff neck has developed, which was reported to Dr. Cavazos during the last visit in 01/2025. Despite attempts to alleviate the stiffness through changes in pillows and other measures, the symptom persists daily, with a pain intensity rating of 6 to 7 out of 10. Intermittent numbness in the legs and severe lower back pain that can induce tears are also experienced. Occasionally, difficulty moving one of the legs necessitates manual assistance. Physical therapy is considered, but a definitive diagnosis is sought first. An upcoming appointment is scheduled for 06/2025. Access to pain medication and muscle relaxants is currently available.         History      Past Medical History:   Diagnosis Date    Arthritis 4/2023    Callus     Depression 09/2019    Diverticulosis 12/2016    Full thickness rotator cuff tear 08/04/2022    Hyperlipidemia October 2011    Hypertension October 2011    Plantar  fasciitis 2017    Sleep apnea 2012    New sleep study to be performed on 3/20/2023    Type 2 diabetes mellitus with hyperglycemia, without long-term current use of insulin 2022       Past Surgical History:   Procedure Laterality Date    ABDOMINOPLASTY      tummy tuck    BREAST SURGERY  1998    Bilateral reduction    COLONOSCOPY  Dec 2016    COLONOSCOPY N/A 2024    Procedure: COLONOSCOPY WITH COLD AND HOT SNARE POLYPECTOMIES, ELEVIEW INJECTION, CLIP APPLICATION X 1;  Surgeon: Fer Dale MD;  Location: [facility];  Service: Gastroenterology;  Laterality: N/A;  COLON POLYPS    EYE SURGERY  Aug 2019    Lasik    HAND SURGERY Left     thumb joint removal    TUBAL ABDOMINAL LIGATION  Sep 1992       Family History   Problem Relation Age of Onset    Alcohol abuse Mother             Alcohol abuse Sister     Drug abuse Sister     Alcohol abuse Brother     Drug abuse Brother     Early death Maternal Grandmother          at 42 of heart attack    Alcohol abuse Maternal Grandfather             Early death Maternal Grandfather          at 49 of Heart attack    Colon cancer Neg Hx         Current Medications        Current Outpatient Medications:     cephalexin (KEFLEX) 500 MG capsule, Take 1 capsule by mouth 2 (Two) Times a Day., Disp: , Rfl:     HYDROcodone-acetaminophen (NORCO) 5-325 MG per tablet, Take 1 tablet by mouth Every Night., Disp: , Rfl:     lisinopril (PRINIVIL,ZESTRIL) 30 MG tablet, Take 1 tablet by mouth Every Other Day., Disp: 45 tablet, Rfl: 3    naproxen (NAPROSYN) 500 MG tablet, Take 1 tablet by mouth 2 (Two) Times a Day With Meals., Disp: , Rfl:     omeprazole (priLOSEC) 40 MG capsule, Take 1 capsule by mouth Daily., Disp: 90 capsule, Rfl: 3    prazosin (MINIPRESS) 2 MG capsule, Take 2 capsules by mouth Every Night., Disp: 180 capsule, Rfl: 3    simvastatin (ZOCOR) 40 MG tablet, Take 1 tablet by mouth Every Night., Disp: 90 tablet, Rfl: 3     "venlafaxine XR (EFFEXOR-XR) 150 MG 24 hr capsule, Take 1 capsule by mouth Daily., Disp: 90 capsule, Rfl: 3     Allergies     Allergies   Allergen Reactions    Ozempic (0.25 Or 0.5 Mg-Dose) [Semaglutide(0.25 Or 0.5mg-Dos)] Shortness Of Breath and GI Intolerance    Azithromycin GI Intolerance       Social History       Social History     Social History Narrative    Not on file       Immunizations     Immunization:  Immunization History   Administered Date(s) Administered    ABRYSVO (RSV, 60+ or pregnant women 32-36 wks) 11/06/2023    COVID-19 (MODERNA) 12YRS+ (SPIKEVAX) 11/06/2023    COVID-19 (UNSPECIFIED) 03/31/2021    Fluad Quad 65+ 11/06/2023    Fluzone High-Dose 65+YRS 01/07/2025    Pneumococcal Conjugate 20-Valent (PCV20) 08/24/2023    Shingrix 11/06/2023, 05/06/2024          Objective     Objective     Vital Signs:   /74 (BP Location: Left arm, Patient Position: Sitting, Cuff Size: Adult)   Pulse 76   Temp 98.6 °F (37 °C) (Temporal)   Ht 162.6 cm (64\")   Wt 79.4 kg (175 lb)   SpO2 100%   BMI 30.04 kg/m²       Physical Exam  Constitutional:       Appearance: Normal appearance.   HENT:      Nose: Nose normal.      Mouth/Throat:      Mouth: Mucous membranes are moist.   Cardiovascular:      Rate and Rhythm: Normal rate and regular rhythm.      Pulses: Normal pulses.      Heart sounds: Normal heart sounds.   Pulmonary:      Effort: Pulmonary effort is normal.      Breath sounds: Normal breath sounds.   Skin:     General: Skin is warm and dry.   Neurological:      General: No focal deficit present.      Mental Status: She is alert and oriented to person, place, and time.   Psychiatric:         Mood and Affect: Mood normal.         Behavior: Behavior normal.         Physical Exam        Results    The following data was reviewed by: KIMBERLY Arevalo on 05/01/2025:          Results         Assessment and Plan        Assessment and Plan    Diagnoses and all orders for this visit:    1. Chronic midline " back pain, unspecified back location (Primary)  -     Cancel: XR Spine Thoracolumbar 2 View; Future  -     XR spine thoracic 2 vw; Future    2. Torticollis  -     XR Spine Cervical 2 or 3 View; Future        Assessment & Plan  1. Back pain.  - Reports ongoing back pain that has been progressively worsening, with a history of a fall 4-5 years ago.  - Experiences numbness in legs and severe lower back pain, sometimes causing immobility in the leg.  - Comprehensive spinal x-rays, extending from the cervical to the lumbar region, have been ordered today. Results are expected within the next few days.  - Depending on x-ray findings, physical therapy may be recommended before considering an MRI. Advised to continue current pain management regimen, including muscle relaxers, if needed.        Follow Up        Follow Up   No follow-ups on file.  Patient was given instructions and counseling regarding her condition or for health maintenance advice. Please see specific information pulled into the AVS if appropriate.    Answers submitted by the patient for this visit:  Back Pain Questionnaire (Submitted on 4/26/2025)  Chief Complaint: Back pain  Chronicity: chronic  Onset: more than 1 month ago  Frequency: constantly  Progression since onset: worsening  Pain location: lumbar spine  Pain quality: aching, burning, shooting  Radiates to: left thigh, left anterolateral lower leg, left foot  Pain - numeric: 8/10  Pain is: the same all the time  Aggravated by: bending, position, lying down, sitting, standing, twisting  Stiffness is present: all day  abdominal pain: No  bladder incontinence: No  bowel incontinence: No  chest pain: No  dysuria: No  fever: No  leg pain: Yes  numbness: Yes  paresthesias: Yes  pelvic pain: No  perianal numbness: No  tingling: Yes  weakness: Yes  weight loss: No  Risk factors: poor posture  Additional Information: Pain seems to radiate up to left shoulder and left side of neck.

## 2025-05-20 ENCOUNTER — HOSPITAL ENCOUNTER (OUTPATIENT)
Dept: CT IMAGING | Facility: HOSPITAL | Age: 70
Discharge: HOME | End: 2025-05-20
Payer: MEDICARE

## 2025-05-20 DIAGNOSIS — R91.1 LUNG NODULE SEEN ON IMAGING STUDY: ICD-10-CM

## 2025-05-20 DIAGNOSIS — F43.10 PTSD (POST-TRAUMATIC STRESS DISORDER): Primary | ICD-10-CM

## 2025-05-20 LAB
CREAT BLDA-MCNC: 0.8 MG/DL (ref 0.6–1.3)
EGFRCR SERPLBLD CKD-EPI 2021: 80.4 ML/MIN/1.73

## 2025-05-20 PROCEDURE — 71260 CT THORAX DX C+: CPT

## 2025-05-20 PROCEDURE — 25510000001 IOPAMIDOL PER 1 ML

## 2025-05-20 PROCEDURE — 82565 ASSAY OF CREATININE: CPT

## 2025-05-20 RX ORDER — PRAZOSIN HYDROCHLORIDE 1 MG/1
1 CAPSULE ORAL NIGHTLY
Qty: 30 CAPSULE | Refills: 2 | Status: SHIPPED | OUTPATIENT
Start: 2025-05-20

## 2025-05-20 RX ORDER — IOPAMIDOL 755 MG/ML
100 INJECTION, SOLUTION INTRAVASCULAR
Status: COMPLETED | OUTPATIENT
Start: 2025-05-20 | End: 2025-05-20

## 2025-05-20 RX ADMIN — IOPAMIDOL 100 ML: 755 INJECTION, SOLUTION INTRAVENOUS at 11:38

## 2025-05-21 ENCOUNTER — TREATMENT (OUTPATIENT)
Dept: PHYSICAL THERAPY | Facility: CLINIC | Age: 70
End: 2025-05-21
Payer: MEDICARE

## 2025-05-21 DIAGNOSIS — M79.642 PAIN OF LEFT HAND: ICD-10-CM

## 2025-05-21 DIAGNOSIS — M18.12 ARTHRITIS OF CARPOMETACARPAL (CMC) JOINT OF LEFT THUMB: ICD-10-CM

## 2025-05-21 DIAGNOSIS — G56.02 CARPAL TUNNEL SYNDROME OF LEFT WRIST: Primary | ICD-10-CM

## 2025-05-21 PROCEDURE — 97166 OT EVAL MOD COMPLEX 45 MIN: CPT | Performed by: OCCUPATIONAL THERAPIST

## 2025-05-21 PROCEDURE — 97112 NEUROMUSCULAR REEDUCATION: CPT | Performed by: OCCUPATIONAL THERAPIST

## 2025-05-21 PROCEDURE — 97110 THERAPEUTIC EXERCISES: CPT | Performed by: OCCUPATIONAL THERAPIST

## 2025-05-21 NOTE — PROGRESS NOTES
Outpatient Occupational Therapy Ortho Initial Evaluation  [address] [place] [address]    Patient: Suyapa Barrera   : 1955  Referring practitioner: Madan Sandhu MD  Date of Initial Visit: 2025  Today's Date: 2025.   Patient seen for 1 sessions  Diagnosis/ICD-10 Code:      ICD-10-CM ICD-9-CM   1. Carpal tunnel syndrome of left wrist  G56.02 354.0   2. Arthritis of carpometacarpal (CMC) joint of left thumb  M18.12 716.94   3. Pain of left hand  M79.642 729.5                Subjective Questionnaire: QuickDASH: 35      Subjective Evaluation    History of Present Illness  Date of surgery: 2025  Mechanism of injury: Has had progressively worsening pain in L thumb over last several years.  25 L CMC arthroplasty, and CTR.       Patient Occupation: retired - sewing Quality of life: excellent    Pain  Current pain ratin  Location: L thumb CMC  Quality: dull ache  Relieving factors: ice (ice)  Aggravating factors: lifting and repetitive movement (sewing)  Progression: improved    Social Support  Lives in: one-story house  Lives with: spouse    Hand dominance: right    Treatments  Previous treatment: immobilization and injection treatment  Patient Goals  Patient goals for therapy: decreased edema, decreased pain, increased motion, increased strength, independence with ADLs/IADLs and return to sport/leisure activities  Patient goal: get back to sewing, cooking/cleaning, take care of dog         Past Medical hx: no significant medical hx    Objective          Active Range of Motion     Right Wrist   Wrist flexion: 55 degrees   Wrist extension: 45 degrees   Radial deviation: 20 degrees   Ulnar deviation: 25 degrees     Additional Active Range of Motion Details  0-35   0-45    ABD 45  Ext 45    Full fist        Strength/Myotome Testing     Right Wrist/Hand      (2nd hand position)   Right  strength (2nd hand position) 50 lbs    Swelling     Left Wrist/Hand   Circumference  wrist: 17 cm          Assessment & Plan       Assessment  Impairments: abnormal coordination, abnormal muscle firing, abnormal or restricted ROM, activity intolerance, impaired physical strength, lacks appropriate home exercise program, pain with function, safety issue and weight-bearing intolerance   Functional limitations: carrying objects, lifting, pulling, pushing, reaching behind back, reaching overhead and unable to perform repetitive tasks   Assessment details: Pt presents with L hand stiffness, swelling, hypersensitivity, and decreased functional strength.  Pt has reduced I with functional use of lifting and gripping.   Prognosis: good    Goals  Plan Goals: 1. The patient complains of pain in the L hand                  LTG 1: 12 weeks:  The patient will report a pain rating of 0/10 or better in order to improve sleep quality and tolerance to performance of activities of daily living.                                  STATUS:  New                  STG 1a: 4weeks:  The patient will report a pain rating of 3/10 or better.                                   STATUS:  New  2. The patient has limited ROM of the L thumb                   LTG 2: 12 weeks:  The patient will demonstrate 90 degrees of ROSS to allow the patient to sew.                                  STATUS:  New                   STG 2a: 4 weeks:  The patient will demonstrate 80 degrees of ROSS.                                  STATUS:  New                             3. The patient has limited strength of the L UE.                  LTG 3: 12 weeks:  The patient will demonstrate #40 in order to return to cooking and cleaning tasks.                                  STATUS:  New                  STG 3a: 4 weeks:  The patient will demonstrate tolerance to light strengthening without adverse reaction.                                  STATUS:  New  4. Carrying, Moving, and Handling Objects Functional Limitation                                    LTG 4: 12  weeks:  The patient will demonstrate 0% limitation by achieving a score of 11 on the Quick DASH.                                  STATUS:  New                  STG 4a: 4 weeks:  The patient will demonstrate 20-39% limitation by achieving a score of 20 on the Quick DASH.                                    STATUS:  New                    TREATMENT: Orthotic fabrication/fitting/management and training, Manual therapy, therapeutic exercise, home exercise instruction, and modalities as needed to include: electrical stimulation, ultrasound, moist heat, paraffin, fluidotherapy, dry needling, and ice.       Plan  Planned modality interventions: TENS, thermotherapy (hydrocollator packs), thermotherapy (paraffin bath), ultrasound and electrical stimulation/Russian stimulation  Other planned modality interventions: fluidotherapy, dry needling  Planned therapy interventions: manual therapy, ADL retraining, motor coordination training, neuromuscular re-education, soft tissue mobilization, fine motor coordination training, body mechanics training, balance/weight-bearing training, functional ROM exercises, flexibility, spinal/joint mobilization, strengthening, stretching, therapeutic activities, IADL retraining, joint mobilization and home exercise program  Frequency: 2x week  Duration in weeks: 12  Treatment plan discussed with: patient        Patient is indicated for skilled occupational therapy services.    History # of Personal Factors and/or Comorbidities: MODERATE (1-2)  Examination of Body System(s): # of elements: MODERATE (3)  Clinical Presentation: EVOLVING  Clinical Decision Making: MODERATE     Evaluation:  Low Complexity:    0     mins  96870;  Mod Complexity:    20     mins  70100;  High Complexity:    0     mins  57486;    Timed:  Manual Therapy:    10     mins  46296;  Therapeutic Exercise:    10     mins  96164;  Therapeutic Activity:    0     mins  51568;     Neuromuscular Liu:    10    mins  46910;     Ultrasound:     0     mins  84608;    Electrical Stimulation:    0     mins  29256;    Untimed:  Electrical Stimulation:    0     mins  80173 ( );  Fluidotherapy:        0    mins  02473  Dry Needlin    mins      Timed Treatment:   30   mins   Total Treatment:     50   mins      OT SIGNATURE: JAYLON Bennett, OTR/L, CHT     Electronically signed    [place] LICENSE: [id number]   DATE TREATMENT INITIATED: 2025    Initial Certification  Certification Period: 2025 thru 2025  I certify that the therapy services are furnished while this patient is under my care.  The services outlined above are required by this patient, and will be reviewed every 90 days.     Signature:______________________________________________ PHYSICIAN:  Madan Sandhu MD   NPI: [id number]                                      DATE:    Please sign and return via fax to [phone]   Thank you, [facility] Occupational Therapy.

## 2025-05-28 ENCOUNTER — TREATMENT (OUTPATIENT)
Dept: PHYSICAL THERAPY | Facility: CLINIC | Age: 70
End: 2025-05-28
Payer: MEDICARE

## 2025-05-28 DIAGNOSIS — M79.642 PAIN OF LEFT HAND: ICD-10-CM

## 2025-05-28 DIAGNOSIS — M18.12 ARTHRITIS OF CARPOMETACARPAL (CMC) JOINT OF LEFT THUMB: ICD-10-CM

## 2025-05-28 DIAGNOSIS — G56.02 CARPAL TUNNEL SYNDROME OF LEFT WRIST: Primary | ICD-10-CM

## 2025-05-28 PROCEDURE — 97112 NEUROMUSCULAR REEDUCATION: CPT | Performed by: OCCUPATIONAL THERAPIST

## 2025-05-28 PROCEDURE — 97530 THERAPEUTIC ACTIVITIES: CPT | Performed by: OCCUPATIONAL THERAPIST

## 2025-05-28 PROCEDURE — 97110 THERAPEUTIC EXERCISES: CPT | Performed by: OCCUPATIONAL THERAPIST

## 2025-05-28 NOTE — PROGRESS NOTES
Occupational Therapy Daily Treatment Note   [address] [place] [address]    Patient: Suyapa Barrera   : 1955  Referring practitioner: Madan Sandhu MD  Date of Initial Visit: Type: THERAPY  Noted: 2025  Today's Date: 2025.   Patient seen for 2 sessions    ICD-10-CM ICD-9-CM   1. Carpal tunnel syndrome of left wrist  G56.02 354.0   2. Arthritis of carpometacarpal (CMC) joint of left thumb  M18.12 716.94   3. Pain of left hand  M79.642 729.5          Suyapa Barrera reports the brace has been rubbing me.  6/10 pain aching all over.       Functional status protecting secondary to orthopedic precautions, but able to take shower I'ly.     Objective   See Exercise, Manual, and Modality Logs for complete treatment.     Progressed functional movement and endurance.  Pt fatigues with active grasping patterns.  Added light fine motor tasks with good tolerance this date.     Assessment/Plan  Progressing functional movement.  Pt progressing with endurance but still fatigues.     Cont per POC           Timed:  Manual Therapy:    0     mins  98084;  Therapeutic Exercise:    20     mins  06285;  Therapeutic Activity:    10     mins  94530;     Neuromuscular Liu:    10    mins  35754;    Ultrasound:     0     mins  24023;    Electrical Stimulation:    0     mins  14123;    Untimed:  Electrical Stimulation:    0     mins  70694 ( );  Fluidotherapy:        0    mins  36139  Dry Needlin    mins  24206    Timed Treatment:   40   mins   Total Treatment:     40   mins    OT SIGNATURE: JAYLON Bennett, OTR/L, CHT     Electronically signed    [place] LICENSE: [id number]

## 2025-05-29 DIAGNOSIS — E04.1 THYROID NODULE: Primary | ICD-10-CM

## 2025-06-02 ENCOUNTER — TREATMENT (OUTPATIENT)
Dept: PHYSICAL THERAPY | Facility: CLINIC | Age: 70
End: 2025-06-02
Payer: MEDICARE

## 2025-06-02 DIAGNOSIS — M79.642 PAIN OF LEFT HAND: ICD-10-CM

## 2025-06-02 DIAGNOSIS — M18.12 ARTHRITIS OF CARPOMETACARPAL (CMC) JOINT OF LEFT THUMB: ICD-10-CM

## 2025-06-02 DIAGNOSIS — G56.02 CARPAL TUNNEL SYNDROME OF LEFT WRIST: Primary | ICD-10-CM

## 2025-06-02 PROCEDURE — 97112 NEUROMUSCULAR REEDUCATION: CPT | Performed by: OCCUPATIONAL THERAPIST

## 2025-06-02 PROCEDURE — 97110 THERAPEUTIC EXERCISES: CPT | Performed by: OCCUPATIONAL THERAPIST

## 2025-06-02 NOTE — PROGRESS NOTES
Occupational Therapy Daily Treatment Note   [address] [place] [address]    Patient: Suyapa Barrera   : 1955  Referring practitioner: Madan Sandhu MD  Date of Initial Visit: Type: THERAPY  Noted: 2025  Today's Date: 2025.   Patient seen for 3 sessions    ICD-10-CM ICD-9-CM   1. Carpal tunnel syndrome of left wrist  G56.02 354.0   2. Arthritis of carpometacarpal (CMC) joint of left thumb  M18.12 716.94   3. Pain of left hand  M79.642 729.5          Suyapa Barrera reports it itches a lot.  Pain overall 3-4/10       Functional status I can button my pants.    Objective   See Exercise, Manual, and Modality Logs for complete treatment.   Pt progressing well with AROM in L thumb.  Pain is improving.  Scar adhesions  doing well with scar massage.     Assessment/Plan  Cont skilled OT for AROM, PROM, scar mgmt, pain mgmt, and functional re-ed       Cont per POC           Timed:  Manual Therapy:    10     mins  38646;  Therapeutic Exercise:    10     mins  26242;  Therapeutic Activity:      0     mins  25721;     Neuromuscular Liu:    10    mins  15162;    Ultrasound:     0     mins  64642;    Electrical Stimulation:    0     mins  47311;    Untimed:  Electrical Stimulation:    0     mins  12737 ( );  Fluidotherapy:        0    mins  32284  Dry Needlin    mins      Timed Treatment:   30   mins   Total Treatment:     30   mins    OT SIGNATURE: JAYLON Bennett, OTR/L, CHT     Electronically signed    [place] LICENSE: [id number]

## 2025-06-04 ENCOUNTER — TREATMENT (OUTPATIENT)
Dept: PHYSICAL THERAPY | Facility: CLINIC | Age: 70
End: 2025-06-04
Payer: MEDICARE

## 2025-06-04 DIAGNOSIS — M79.642 PAIN OF LEFT HAND: ICD-10-CM

## 2025-06-04 DIAGNOSIS — M18.12 ARTHRITIS OF CARPOMETACARPAL (CMC) JOINT OF LEFT THUMB: ICD-10-CM

## 2025-06-04 DIAGNOSIS — G56.02 CARPAL TUNNEL SYNDROME OF LEFT WRIST: Primary | ICD-10-CM

## 2025-06-04 PROCEDURE — 97110 THERAPEUTIC EXERCISES: CPT | Performed by: OCCUPATIONAL THERAPIST

## 2025-06-04 PROCEDURE — 97112 NEUROMUSCULAR REEDUCATION: CPT | Performed by: OCCUPATIONAL THERAPIST

## 2025-06-04 PROCEDURE — 97530 THERAPEUTIC ACTIVITIES: CPT | Performed by: OCCUPATIONAL THERAPIST

## 2025-06-04 NOTE — PROGRESS NOTES
Occupational Therapy Daily Treatment Note   [address] [place] [address]    Patient: Suyapa Barrera   : 1955  Referring practitioner: Madan Sandhu MD  Date of Initial Visit: Type: THERAPY  Noted: 2025  Today's Date: 2025.   Patient seen for 4 sessions    ICD-10-CM ICD-9-CM   1. Carpal tunnel syndrome of left wrist  G56.02 354.0   2. Arthritis of carpometacarpal (CMC) joint of left thumb  M18.12 716.94   3. Pain of left hand  M79.642 729.5          Suyapa Barrera reports having some pillar pain today.       Functional status I have been wearing the brace and being careful with my hand.     Objective   See Exercise, Manual, and Modality Logs for complete treatment.   Progressing with AROM and PROM.  Scar massage and desensitization helped increase mobility and decrease pain.    Assessment/Plan    Cont per POC           Timed:  Manual Therapy:    10     mins  55385;  Therapeutic Exercise:    10     mins  27279;  Therapeutic Activity:    10     mins  58953;     Neuromuscular Liu:    10    mins  18369;    Ultrasound:     0     mins  91717;    Electrical Stimulation:    0     mins  94903;    Untimed:  Electrical Stimulation:    0     mins  82563 ( );  Fluidotherapy:        0    mins  61901  Dry Needlin    mins      Timed Treatment:   40   mins   Total Treatment:     40   mins    OT SIGNATURE: JAYLON Bennett, OTR/L, CHT     Electronically signed    [place] LICENSE: [id number]

## 2025-06-05 ENCOUNTER — LAB (OUTPATIENT)
Dept: LAB | Facility: HOSPITAL | Age: 70
End: 2025-06-05
Payer: MEDICARE

## 2025-06-05 DIAGNOSIS — E11.65 TYPE 2 DIABETES MELLITUS WITH HYPERGLYCEMIA, WITHOUT LONG-TERM CURRENT USE OF INSULIN: Chronic | ICD-10-CM

## 2025-06-05 DIAGNOSIS — E55.9 VITAMIN D DEFICIENCY: ICD-10-CM

## 2025-06-05 DIAGNOSIS — I10 HYPERTENSION, UNSPECIFIED TYPE: Chronic | ICD-10-CM

## 2025-06-05 DIAGNOSIS — F41.8 DEPRESSION WITH ANXIETY: Chronic | ICD-10-CM

## 2025-06-05 DIAGNOSIS — E78.2 MIXED HYPERLIPIDEMIA: Chronic | ICD-10-CM

## 2025-06-05 DIAGNOSIS — E78.2 MIXED HYPERLIPIDEMIA: Primary | Chronic | ICD-10-CM

## 2025-06-05 LAB
ALBUMIN SERPL-MCNC: 3.8 G/DL (ref 3.5–5.2)
ALBUMIN UR-MCNC: <1.2 MG/DL
ALBUMIN/GLOB SERPL: 1.4 G/DL
ALP SERPL-CCNC: 86 U/L (ref 39–117)
ALT SERPL W P-5'-P-CCNC: 16 U/L (ref 1–33)
ANION GAP SERPL CALCULATED.3IONS-SCNC: 9.8 MMOL/L (ref 5–15)
AST SERPL-CCNC: 25 U/L (ref 1–32)
BILIRUB SERPL-MCNC: 0.6 MG/DL (ref 0–1.2)
BUN SERPL-MCNC: 17 MG/DL (ref 8–23)
BUN/CREAT SERPL: 18.5 (ref 7–25)
CALCIUM SPEC-SCNC: 9.3 MG/DL (ref 8.6–10.5)
CHLORIDE SERPL-SCNC: 108 MMOL/L (ref 98–107)
CHOLEST SERPL-MCNC: 185 MG/DL (ref 0–200)
CO2 SERPL-SCNC: 23.2 MMOL/L (ref 22–29)
CREAT SERPL-MCNC: 0.92 MG/DL (ref 0.57–1)
CREAT UR-MCNC: 127 MG/DL
DEPRECATED RDW RBC AUTO: 40.8 FL (ref 37–54)
EGFRCR SERPLBLD CKD-EPI 2021: 68 ML/MIN/1.73
ERYTHROCYTE [DISTWIDTH] IN BLOOD BY AUTOMATED COUNT: 12.4 % (ref 12.3–15.4)
FOLATE SERPL-MCNC: 4.97 NG/ML (ref 4.78–24.2)
GLOBULIN UR ELPH-MCNC: 2.8 GM/DL
GLUCOSE SERPL-MCNC: 97 MG/DL (ref 65–99)
HBA1C MFR BLD: 5.4 % (ref 4.8–5.6)
HCT VFR BLD AUTO: 43.6 % (ref 34–46.6)
HDLC SERPL-MCNC: 72 MG/DL (ref 40–60)
HGB BLD-MCNC: 14.7 G/DL (ref 12–15.9)
LDLC SERPL CALC-MCNC: 102 MG/DL (ref 0–100)
LDLC/HDLC SERPL: 1.41 {RATIO}
MCH RBC QN AUTO: 30.6 PG (ref 26.6–33)
MCHC RBC AUTO-ENTMCNC: 33.7 G/DL (ref 31.5–35.7)
MCV RBC AUTO: 90.8 FL (ref 79–97)
MICROALBUMIN/CREAT UR: NORMAL MG/G{CREAT}
PLATELET # BLD AUTO: 202 10*3/MM3 (ref 140–450)
PMV BLD AUTO: 10.4 FL (ref 6–12)
POTASSIUM SERPL-SCNC: 4.1 MMOL/L (ref 3.5–5.2)
PROT SERPL-MCNC: 6.6 G/DL (ref 6–8.5)
RBC # BLD AUTO: 4.8 10*6/MM3 (ref 3.77–5.28)
SODIUM SERPL-SCNC: 141 MMOL/L (ref 136–145)
T4 FREE SERPL-MCNC: 0.98 NG/DL (ref 0.92–1.68)
TRIGL SERPL-MCNC: 56 MG/DL (ref 0–150)
TSH SERPL DL<=0.05 MIU/L-ACNC: 1.77 UIU/ML (ref 0.27–4.2)
VIT B12 BLD-MCNC: 212 PG/ML (ref 211–946)
VLDLC SERPL-MCNC: 11 MG/DL (ref 5–40)
WBC NRBC COR # BLD AUTO: 6.87 10*3/MM3 (ref 3.4–10.8)

## 2025-06-05 PROCEDURE — 84439 ASSAY OF FREE THYROXINE: CPT

## 2025-06-05 PROCEDURE — 36415 COLL VENOUS BLD VENIPUNCTURE: CPT

## 2025-06-05 PROCEDURE — 82570 ASSAY OF URINE CREATININE: CPT

## 2025-06-05 PROCEDURE — 84443 ASSAY THYROID STIM HORMONE: CPT

## 2025-06-05 PROCEDURE — 82607 VITAMIN B-12: CPT

## 2025-06-05 PROCEDURE — 82746 ASSAY OF FOLIC ACID SERUM: CPT

## 2025-06-05 PROCEDURE — 80053 COMPREHEN METABOLIC PANEL: CPT

## 2025-06-05 PROCEDURE — 82043 UR ALBUMIN QUANTITATIVE: CPT

## 2025-06-05 PROCEDURE — 80061 LIPID PANEL: CPT

## 2025-06-05 PROCEDURE — 83036 HEMOGLOBIN GLYCOSYLATED A1C: CPT

## 2025-06-05 PROCEDURE — 85027 COMPLETE CBC AUTOMATED: CPT

## 2025-06-05 RX ORDER — VENLAFAXINE HYDROCHLORIDE 150 MG/1
150 CAPSULE, EXTENDED RELEASE ORAL DAILY
Qty: 90 CAPSULE | Refills: 3 | Status: SHIPPED | OUTPATIENT
Start: 2025-06-05

## 2025-06-09 ENCOUNTER — TREATMENT (OUTPATIENT)
Dept: PHYSICAL THERAPY | Facility: CLINIC | Age: 70
End: 2025-06-09
Payer: MEDICARE

## 2025-06-09 DIAGNOSIS — G56.02 CARPAL TUNNEL SYNDROME OF LEFT WRIST: Primary | ICD-10-CM

## 2025-06-09 DIAGNOSIS — M79.642 PAIN OF LEFT HAND: ICD-10-CM

## 2025-06-09 DIAGNOSIS — M18.12 ARTHRITIS OF CARPOMETACARPAL (CMC) JOINT OF LEFT THUMB: ICD-10-CM

## 2025-06-09 PROCEDURE — 97112 NEUROMUSCULAR REEDUCATION: CPT | Performed by: OCCUPATIONAL THERAPIST

## 2025-06-09 PROCEDURE — 97110 THERAPEUTIC EXERCISES: CPT | Performed by: OCCUPATIONAL THERAPIST

## 2025-06-09 NOTE — PROGRESS NOTES
Occupational Therapy Daily Treatment Note   [address] [place] [address]    Patient: Suyapa Barrera   : 1955  Referring practitioner: Madan Sandhu MD  Date of Initial Visit: Type: THERAPY  Noted: 2025  Today's Date: 2025.   Patient seen for 5 sessions    ICD-10-CM ICD-9-CM   1. Carpal tunnel syndrome of left wrist  G56.02 354.0   2. Pain of left hand  M79.642 729.5   3. Arthritis of carpometacarpal (CMC) joint of left thumb  M18.12 716.94          Suyapa Barrera reports  I am doing well.  No real pain.  I am ready to get out of the splint.     Functional status progressing as tolerated, limited with splint    Objective   See Exercise, Manual, and Modality Logs for complete treatment.   Th ABD 50  Th Ext 50  Th   0-30  0-40    Wrist ext 60  Wrist flexion 85      Assessment/Plan    Pt is doing excellent.  Progressing as tolerated per protocol.  Pt having no pain and improving functional I daily.     Cont per POC           Timed:  Manual Therapy:    5     mins  87380;  Therapeutic Exercise:    20     mins  60708;  Therapeutic Activity:    5     mins  30979;     Neuromuscular Liu:    10    mins  99037;    Ultrasound:     0     mins  70550;    Electrical Stimulation:    0     mins  26632;    Untimed:  Electrical Stimulation:    0     mins  92462 ( );  Fluidotherapy:        0    mins  52202  Dry Needlin    mins  97844    Timed Treatment:   30   mins   Total Treatment:     40   mins    OT SIGNATURE: JAYLON Bennett, OTR/L, CHT     Electronically signed    [place] LICENSE: [id number]

## 2025-06-09 NOTE — LETTER
Progress Note  6/9/2025  Madan Sandhu MD    Re: Suyapa Barrera  ________________________________________________________________    Mrs. Suyapa Barrera, has attended 5 OT sessions.  Treatment has consisted of: AROM, PROM, scar mgmt, pain mgmt, and functional re-ed     S: Mrs. Suyapa Barrera states: I am doing well.  No real pain.  I am ready to get out of the splint.     O:   Th ABD 50  Th Ext 50  Th   0-30  0-40    Wrist ext 60  Wrist flexion 85     A: Pt is doing excellent.  Progressing as tolerated per protocol.  Pt having no pain and improving functional I daily.     P: Please advise after your exam.    Thank you for this referral.

## 2025-06-10 ENCOUNTER — HOSPITAL ENCOUNTER (OUTPATIENT)
Dept: ULTRASOUND IMAGING | Facility: HOSPITAL | Age: 70
Discharge: HOME | End: 2025-06-10
Payer: MEDICARE

## 2025-06-10 PROCEDURE — 76536 US EXAM OF HEAD AND NECK: CPT

## 2025-06-11 DIAGNOSIS — E04.1 NODULE OF LEFT LOBE OF THYROID GLAND: Primary | ICD-10-CM

## 2025-06-12 ENCOUNTER — OFFICE VISIT (OUTPATIENT)
Dept: FAMILY MEDICINE CLINIC | Facility: CLINIC | Age: 70
End: 2025-06-12
Payer: MEDICARE

## 2025-06-12 VITALS
WEIGHT: 178 LBS | DIASTOLIC BLOOD PRESSURE: 70 MMHG | RESPIRATION RATE: 16 BRPM | TEMPERATURE: 97.6 F | HEART RATE: 79 BPM | HEIGHT: 64 IN | BODY MASS INDEX: 30.39 KG/M2 | OXYGEN SATURATION: 97 % | SYSTOLIC BLOOD PRESSURE: 128 MMHG

## 2025-06-12 DIAGNOSIS — R11.0 NAUSEA: ICD-10-CM

## 2025-06-12 DIAGNOSIS — Z78.0 POSTMENOPAUSAL: ICD-10-CM

## 2025-06-12 DIAGNOSIS — E78.2 MIXED HYPERLIPIDEMIA: Primary | Chronic | ICD-10-CM

## 2025-06-12 DIAGNOSIS — F41.8 DEPRESSION WITH ANXIETY: Chronic | ICD-10-CM

## 2025-06-12 DIAGNOSIS — I10 PRIMARY HYPERTENSION: Chronic | ICD-10-CM

## 2025-06-12 PROCEDURE — 1159F MED LIST DOCD IN RCRD: CPT | Performed by: FAMILY MEDICINE

## 2025-06-12 PROCEDURE — 1160F RVW MEDS BY RX/DR IN RCRD: CPT | Performed by: FAMILY MEDICINE

## 2025-06-12 PROCEDURE — 3044F HG A1C LEVEL LT 7.0%: CPT | Performed by: FAMILY MEDICINE

## 2025-06-12 PROCEDURE — 3078F DIAST BP <80 MM HG: CPT | Performed by: FAMILY MEDICINE

## 2025-06-12 PROCEDURE — 3074F SYST BP LT 130 MM HG: CPT | Performed by: FAMILY MEDICINE

## 2025-06-12 PROCEDURE — 1126F AMNT PAIN NOTED NONE PRSNT: CPT | Performed by: FAMILY MEDICINE

## 2025-06-12 PROCEDURE — 99214 OFFICE O/P EST MOD 30 MIN: CPT | Performed by: FAMILY MEDICINE

## 2025-06-12 RX ORDER — ONDANSETRON 8 MG/1
8 TABLET, ORALLY DISINTEGRATING ORAL EVERY 8 HOURS PRN
Qty: 30 TABLET | Refills: 2 | Status: SHIPPED | OUTPATIENT
Start: 2025-06-12

## 2025-06-12 RX ORDER — VENLAFAXINE HYDROCHLORIDE 150 MG/1
300 CAPSULE, EXTENDED RELEASE ORAL DAILY
Qty: 180 CAPSULE | Refills: 3 | Status: SHIPPED | OUTPATIENT
Start: 2025-06-12

## 2025-06-12 RX ORDER — TIRZEPATIDE 2.5 MG/.5ML
INJECTION, SOLUTION SUBCUTANEOUS
COMMUNITY
Start: 2025-06-09

## 2025-06-17 ENCOUNTER — TREATMENT (OUTPATIENT)
Dept: PHYSICAL THERAPY | Facility: CLINIC | Age: 70
End: 2025-06-17
Payer: MEDICARE

## 2025-06-17 DIAGNOSIS — M79.642 PAIN OF LEFT HAND: ICD-10-CM

## 2025-06-17 DIAGNOSIS — G56.02 CARPAL TUNNEL SYNDROME OF LEFT WRIST: Primary | ICD-10-CM

## 2025-06-17 DIAGNOSIS — M18.12 ARTHRITIS OF CARPOMETACARPAL (CMC) JOINT OF LEFT THUMB: ICD-10-CM

## 2025-06-17 PROCEDURE — 97110 THERAPEUTIC EXERCISES: CPT | Performed by: OCCUPATIONAL THERAPIST

## 2025-06-17 PROCEDURE — 97112 NEUROMUSCULAR REEDUCATION: CPT | Performed by: OCCUPATIONAL THERAPIST

## 2025-06-17 PROCEDURE — 97140 MANUAL THERAPY 1/> REGIONS: CPT | Performed by: OCCUPATIONAL THERAPIST

## 2025-06-17 NOTE — PROGRESS NOTES
"Occupational Therapy Daily Treatment Note  [address] [place]  [address]      Patient: Suyapa Barrera   : 1955  Referring practitioner: Madan Sandhu MD  Date of Initial Visit: Type: THERAPY  Noted: 2025  Today's Date: 2025  Patient seen for 6 sessions           Subjective Evaluation    History of Present Illness    Subjective comment: States that she is not having any pain.  Saw MD and she returned with MD instruction to begin strengthening.  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  Still not lifting heavy items or performing strenuous tasks.  She is driving longer distances now.  Still wearing brace at night..Pain  Current pain ratin           Objective   See Exercise, Manual, and Modality Logs for complete treatment.       Assessment/Plan    Visit Diagnoses:    ICD-10-CM ICD-9-CM   1. Carpal tunnel syndrome of left wrist  G56.02 354.0   2. Pain of left hand  M79.642 729.5   3. Arthritis of carpometacarpal (CMC) joint of left thumb  M18.12 716.94       Progress strengthening /stabilization /functional activity           Timed:  Manual Therapy:                 10     mins  65888  Therapeutic Exercise:      10     mins  24695     Neuromuscular reeducation       10     mins 24124  Therapeutic Activity              10     mins  07150  Ultrasound:                  0     mins  17627     Untimed:  Electrical Stimulation:    0     mins  77893 ( );  Fluidotherapy      0     mins  41894    Timed Treatment:   40   mins   Total Treatment:     40   mins    Mary Lou Ghotra OT, CHT  Occupational Therapist    Electronically signed      [place] license #: [id number]  "

## 2025-06-17 NOTE — PROGRESS NOTES
"Chief Complaint  Rash (On lower leg x1 week NO itching or pain. ) and Nausea (Associated with Mounjaro. )    Subjective          Suyapa Barrera presents to [facility]  Rash  Nausea  Symptoms include nausea and rash.        History of Present Illness  The patient presents for evaluation of a rash, nausea, vertigo, and PTSD.    She reports the development of a rash on her leg, which initially presented as a bump before evolving into its current state over a period of 2 days. The rash is neither itchy nor oozing. This is her first experience with such a rash, and it has not recurred. She has refrained from scratching the affected area. She inquired about the availability of calamine lotion.    She has requested a prescription for Zofran, expressing a preference for the dissolvable form due to its faster onset of action. She has discontinued the use of Mounjaro due to associated nausea, which has resulted in weight gain. She did not have access to Zofran at the time of discontinuation but is now seeking to resume its use. She has also requested a refill of her prescription through [facility].    She experienced a severe episode of vertigo lasting 2 days, which she attributes to an increase in her Minipress dosage from 1 to 2 tablets. She describes the vertigo as so intense that she felt as though she was \"bouncing off the walls\" and had to sit down. This was a new symptom for her. Despite a subsequent reduction in her Minipress dosage, she continues to experience nightly disturbances, including fighting with her covers and screaming. She is seeking alternative treatment options.    She underwent hand surgery on 04/28/2025 and had her brace removed yesterday. She is still in physical therapy.    She had a chest x-ray, then another one, followed by a CT scan. An ultrasound of her thyroid was performed on Tuesday, and now a biopsy is recommended.    PAST SURGICAL HISTORY:  Hand " "surgery on 04/28/2025.      Objective   Vital Signs:   /70 (BP Location: Left arm, Patient Position: Sitting, Cuff Size: Adult)   Pulse 79   Temp 97.6 °F (36.4 °C)   Resp 16   Ht 162.6 cm (64\")   Wt 80.7 kg (178 lb)   SpO2 97%   BMI 30.55 kg/m²       Physical Exam  Vitals reviewed.   Constitutional:       Appearance: Normal appearance. She is well-developed.   HENT:      Head: Normocephalic and atraumatic.      Right Ear: External ear normal.      Left Ear: External ear normal.      Nose: Nose normal.   Eyes:      Conjunctiva/sclera: Conjunctivae normal.      Pupils: Pupils are equal, round, and reactive to light.   Cardiovascular:      Rate and Rhythm: Normal rate.   Pulmonary:      Effort: Pulmonary effort is normal.      Breath sounds: Normal breath sounds.   Abdominal:      General: There is no distension.   Skin:     General: Skin is warm and dry.      Findings: Rash present.   Neurological:      Mental Status: She is alert and oriented to person, place, and time. Mental status is at baseline.   Psychiatric:         Mood and Affect: Mood and affect normal.         Behavior: Behavior normal.         Thought Content: Thought content normal.         Judgment: Judgment normal.          Result Review :   The following data was reviewed by: Robin Cavazos DO on 06/12/2025:  Common labs          11/21/2024    07:55 5/20/2025    11:31 6/5/2025    07:48   Common Labs   Glucose 87   97    BUN 17   17.0    Creatinine 0.84  0.80  0.92    Sodium 141   141    Potassium 4.4   4.1    Chloride 105   108    Calcium 9.2   9.3    Albumin 3.8   3.8    Total Bilirubin 0.7   0.6    Alkaline Phosphatase 86   86    AST (SGOT) 19   25    ALT (SGPT) 10   16    WBC 7.21   6.87    Hemoglobin 14.1   14.7    Hematocrit 44.2   43.6    Platelets 203   202    Total Cholesterol 180   185    Triglycerides 115   56    HDL Cholesterol 47   72    LDL Cholesterol  112   102    Hemoglobin A1C 5.50   5.40    Microalbumin, Urine <1.2   <1.2  "          Results  Labs   - Chloride: 108 mmol/L   - Kidney function: Normal   - Liver enzymes: Normal   - Blood sugar: Normal   - Thyroid function: Normal   - B12: Low   - Cholesterol: 102 mg/dL                 Assessment and Plan    Diagnoses and all orders for this visit:    1. Mixed hyperlipidemia (Primary)    2. Depression with anxiety  -     venlafaxine XR (EFFEXOR-XR) 150 MG 24 hr capsule; Take 2 capsules by mouth Daily.  Dispense: 180 capsule; Refill: 3    3. Primary hypertension    4. Postmenopausal  -     DEXA Bone Density Axial; Future    5. Nausea  -     ondansetron ODT (ZOFRAN-ODT) 8 MG disintegrating tablet; Place 1 tablet on the tongue Every 8 (Eight) Hours As Needed for Nausea or Vomiting.  Dispense: 30 tablet; Refill: 2        Assessment & Plan  1. Rash.  - The rash appears to be resolving spontaneously. It started as a bump and evolved over two days without itching or oozing.  - No signs of infection or worsening symptoms.  - Patient is advised to monitor the rash and report any recurrence or enlargement.  - No specific treatment prescribed at this time.    2. Nausea.  - Patient requests Zofran for nausea management.  - Prescription for dissolvable Zofran will be sent to pharmacy.  - Patient stopped Mounjaro due to nausea.  - Zofran prescription will be refilled to manage symptoms.    3. Vertigo.  - Patient experienced severe vertigo for two days, possibly related to Minipress.  - Vertigo may be exacerbated by blood pressure medications.  - Alternative medication will be considered to manage vertigo symptoms.  - Patient advised to monitor symptoms and report any changes.    4. Post-traumatic stress disorder (PTSD).  - Patient reports nightmares and symptoms consistent with PTSD.  - Current medication (Minipress) may not be effective in managing PTSD symptoms.  - Alternative medication will be considered to manage PTSD symptoms.  - Patient advised to report any changes or worsening of symptoms.    5.  Post-surgical care.  - Patient had hand surgery on 04/28/2025 and recently had the brace removed.  - Currently undergoing physical therapy at [facility].  - No complications reported post-surgery.  - Continued physical therapy recommended for recovery.          Follow Up   Return in about 6 weeks (around 7/24/2025), or if symptoms worsen or fail to improve, for Next scheduled follow up, Recheck, AWV, Labs before.    Patient was given instructions and counseling regarding her condition or for health maintenance advice. Please see specific information pulled into the AVS if appropriate.       Transcribed from ambient dictation for Robin Cavazos DO by Robin Cavazos DO.  06/16/25   23:43 EDT    Patient or patient representative verbalized consent for the use of Ambient Listening during the visit with  Robin Cavazos DO for chart documentation. 6/16/2025  23:44 EDT

## 2025-06-19 ENCOUNTER — TREATMENT (OUTPATIENT)
Dept: PHYSICAL THERAPY | Facility: CLINIC | Age: 70
End: 2025-06-19
Payer: MEDICARE

## 2025-06-19 DIAGNOSIS — M18.12 ARTHRITIS OF CARPOMETACARPAL (CMC) JOINT OF LEFT THUMB: ICD-10-CM

## 2025-06-19 DIAGNOSIS — G56.02 CARPAL TUNNEL SYNDROME OF LEFT WRIST: Primary | ICD-10-CM

## 2025-06-19 DIAGNOSIS — M79.642 PAIN OF LEFT HAND: ICD-10-CM

## 2025-06-19 NOTE — PROGRESS NOTES
Occupational Therapy Daily Treatment Note  [address] [place] [address]  [address]      Patient: Suyapa Barrera   : 1955  Referring practitioner: Madan Sandhu MD  Date of Initial Visit: Type: THERAPY  Noted: 2025  Today's Date: 2025  Patient seen for 7 sessions           Subjective Evaluation    History of Present Illness    Subjective comment: Reports that she is doing better.  Her pain is intermittent.  Functional status:  Not performing any heavy lifting or strenuous activities.  Opening a door can be difficult.Pain  Current pain ratin  Location: L thumb/wrist             Objective   See Exercise, Manual, and Modality Logs for complete treatment.       Assessment & Plan       Assessment  Assessment details: Good tolerance to exercises.  Function improving, pain decreased.  Continue with plan of care.        Visit Diagnoses:    ICD-10-CM ICD-9-CM   1. Carpal tunnel syndrome of left wrist  G56.02 354.0   2. Pain of left hand  M79.642 729.5   3. Arthritis of carpometacarpal (CMC) joint of left thumb  M18.12 716.94       Progress strengthening /stabilization /functional activity           Timed:  Manual Therapy:                 8     mins  84670  Therapeutic Exercise:      10     mins  97512     Neuromuscular reeducation       10     mins 72704  Therapeutic Activity              10     mins  28050  Ultrasound:                  0     mins  33003     Untimed:  Electrical Stimulation:    0     mins  47396 ( );  Fluidotherapy      0     mins  79094    Timed Treatment:   38   mins   Total Treatment:     38   mins    Mary Lou Ghotra OT, CHT  Occupational Therapist    Electronically signed      [place] license #: [id number]

## 2025-06-20 ENCOUNTER — HOSPITAL ENCOUNTER (OUTPATIENT)
Dept: ULTRASOUND IMAGING | Facility: HOSPITAL | Age: 70
Discharge: HOME | End: 2025-06-20
Payer: MEDICARE

## 2025-06-20 DIAGNOSIS — E04.1 NODULE OF LEFT LOBE OF THYROID GLAND: ICD-10-CM

## 2025-06-20 PROCEDURE — 25010000002 LIDOCAINE 1 % SOLUTION

## 2025-06-20 PROCEDURE — 76942 ECHO GUIDE FOR BIOPSY: CPT

## 2025-06-20 RX ORDER — LIDOCAINE HYDROCHLORIDE 10 MG/ML
INJECTION, SOLUTION INFILTRATION; PERINEURAL
Status: COMPLETED
Start: 2025-06-20 | End: 2025-06-20

## 2025-06-20 RX ADMIN — LIDOCAINE HYDROCHLORIDE: 10 INJECTION, SOLUTION INFILTRATION; PERINEURAL at 10:05

## 2025-06-24 ENCOUNTER — TREATMENT (OUTPATIENT)
Dept: PHYSICAL THERAPY | Facility: CLINIC | Age: 70
End: 2025-06-24
Payer: MEDICARE

## 2025-06-24 DIAGNOSIS — M18.12 ARTHRITIS OF CARPOMETACARPAL (CMC) JOINT OF LEFT THUMB: ICD-10-CM

## 2025-06-24 DIAGNOSIS — M79.642 PAIN OF LEFT HAND: ICD-10-CM

## 2025-06-24 DIAGNOSIS — G56.02 CARPAL TUNNEL SYNDROME OF LEFT WRIST: Primary | ICD-10-CM

## 2025-06-24 PROCEDURE — 97530 THERAPEUTIC ACTIVITIES: CPT | Performed by: OCCUPATIONAL THERAPIST

## 2025-06-24 PROCEDURE — 97112 NEUROMUSCULAR REEDUCATION: CPT | Performed by: OCCUPATIONAL THERAPIST

## 2025-06-24 PROCEDURE — 97110 THERAPEUTIC EXERCISES: CPT | Performed by: OCCUPATIONAL THERAPIST

## 2025-06-24 NOTE — PROGRESS NOTES
Re-Assessment / Re-Certification  Occupational Therapy  [address] [place] [id number]      Patient: Suyapa Barrera   : 1955  Diagnosis/ICD-10 Code:  Carpal tunnel syndrome of left wrist [G56.02]  Referring practitioner: Madan Sandhu MD  Date of Initial Visit: Type: THERAPY  Noted: 2025  Today's Date: 2025.   Patient seen for 8 sessions      Subjective:   Suyapa Barrera reports: I am doing what I can until it hurts.   Subjective Questionnaire: QuickDASH: 18  Clinical Progress: improved  Home Program Compliance: Yes  Treatment has included: therapeutic exercise, neuromuscular re-education, manual therapy, and therapeutic activity    Subjective   Objective   Active Range of Motion      Right Wrist   Wrist flexion: 90 degrees   Wrist extension: 60 degrees   Radial deviation: 20 degrees   Ulnar deviation: 25 degrees      Additional Active Range of Motion Details  0-35   0-45     ABD 55  Ext 55     Full fist           Strength/Myotome Testing      Right Wrist/Hand       (2nd hand position)   Right  strength (2nd hand position) 50 lbs     Swelling      Left Wrist/Hand   Circumference wrist: 17 cm      Assessment/Plan    Visit Diagnoses:    ICD-10-CM ICD-9-CM   1. Carpal tunnel syndrome of left wrist  G56.02 354.0   2. Pain of left hand  M79.642 729.5   3. Arthritis of carpometacarpal (CMC) joint of left thumb  M18.12 716.94       Progress toward previous goals: Partially Met    Plan Goals: 1. The patient complains of pain in the L hand                  LTG 1: 12 weeks:  The patient will report a pain rating of 0/10 or better in order to improve sleep quality and tolerance to performance of activities of daily living.                                  STATUS:  MET                  STG 1a: 4weeks:  The patient will report a pain rating of 3/10 or better.                                   STATUS:  MET  2. The patient has limited ROM of the L thumb                   LTG 2: 12 weeks:   The patient will demonstrate 90 degrees of ROSS to allow the patient to sew.                                  STATUS:  NOT MET                  STG 2a: 4 weeks:  The patient will demonstrate 80 degrees of ROSS.                                  STATUS:  MET                           3. The patient has limited strength of the L UE.                  LTG 3: 12 weeks:  The patient will demonstrate #40 in order to return to cooking and cleaning tasks.                                  STATUS:  NOT MET                  STG 3a: 4 weeks:  The patient will demonstrate tolerance to light strengthening without adverse reaction.                                  STATUS:  MET  4. Carrying, Moving, and Handling Objects Functional Limitation                                    LTG 4: 12 weeks:  The patient will demonstrate 0% limitation by achieving a score of 11 on the Quick DASH.                                  STATUS:  NOT MET                  STG 4a: 4 weeks:  The patient will demonstrate 20-39% limitation by achieving a score of 20 on the Quick DASH.                                    STATUS:  MET      Recommendations: Continue as planned  Timeframe: 1 month  Prognosis to achieve goals: good      OT SIGNATURE: JAYLON Bennett, OTR/L, CHT     Electronically signed    [place] LICENSE: [id number]   DATE TREATMENT INITIATED: 6/24/2025      90 Day Recertification  Certification Period: 6/24/2025 thru 9/21/2025  I certify that the therapy services are furnished while this patient is under my care.  The services outlined above are required by this patient, and will be reviewed every 90 days.      Based upon review of the patient's progress and continued therapy plan, it is my medical opinion that Suyapa Barrera should continue occupational therapy treatment at [facility] PHYSICAL THERAPY  [address] [place] [address] [phone].    Signature: __________________________________  PHYSICIAN: Madan Sandhu  MD JOSEPH  NPI: [id number]                                      DATE:      Timed:  Manual Therapy:    0     mins  59595;  Therapeutic Exercise:    20     mins  12769;  Therapeutic Activity:    10     mins  27639;     Neuromuscular Liu:    10    mins  26695;    Ultrasound:     0     mins  55309;    Electrical Stimulation:    0     mins  03147;    Untimed:  Electrical Stimulation:    0     mins  21159 ( );  Fluidotherapy:        0    mins  51381  Dry Needlin    mins      Timed Treatment:   40   mins   Total Treatment:     40   mins

## 2025-06-26 ENCOUNTER — APPOINTMENT (OUTPATIENT)
Dept: GENERAL RADIOLOGY | Facility: HOSPITAL | Age: 70
End: 2025-06-26
Payer: MEDICARE

## 2025-06-26 ENCOUNTER — TREATMENT (OUTPATIENT)
Dept: PHYSICAL THERAPY | Facility: CLINIC | Age: 70
End: 2025-06-26
Payer: MEDICARE

## 2025-06-26 ENCOUNTER — HOSPITAL ENCOUNTER (INPATIENT)
Facility: HOSPITAL | Age: 70
LOS: 2 days | Discharge: HOME | End: 2025-06-28
Attending: EMERGENCY MEDICINE | Admitting: HOSPITALIST
Payer: MEDICARE

## 2025-06-26 ENCOUNTER — APPOINTMENT (OUTPATIENT)
Dept: CT IMAGING | Facility: HOSPITAL | Age: 70
End: 2025-06-26
Payer: MEDICARE

## 2025-06-26 DIAGNOSIS — R26.2 DIFFICULTY IN WALKING: ICD-10-CM

## 2025-06-26 DIAGNOSIS — G56.02 CARPAL TUNNEL SYNDROME OF LEFT WRIST: Primary | ICD-10-CM

## 2025-06-26 DIAGNOSIS — E11.65 UNCONTROLLED TYPE 2 DIABETES MELLITUS WITH HYPERGLYCEMIA: Primary | ICD-10-CM

## 2025-06-26 DIAGNOSIS — M18.12 ARTHRITIS OF CARPOMETACARPAL (CMC) JOINT OF LEFT THUMB: ICD-10-CM

## 2025-06-26 DIAGNOSIS — E11.10 DIABETIC KETOACIDOSIS WITHOUT COMA ASSOCIATED WITH TYPE 2 DIABETES MELLITUS: ICD-10-CM

## 2025-06-26 DIAGNOSIS — M79.642 PAIN OF LEFT HAND: ICD-10-CM

## 2025-06-26 DIAGNOSIS — K52.9 COLITIS: ICD-10-CM

## 2025-06-26 LAB
ACETONE BLD QL: NEGATIVE
ALBUMIN SERPL-MCNC: 4 G/DL (ref 3.5–5.2)
ALBUMIN/GLOB SERPL: 1.3 G/DL
ALP SERPL-CCNC: 95 U/L (ref 39–117)
ALT SERPL W P-5'-P-CCNC: 14 U/L (ref 1–33)
ANION GAP SERPL CALCULATED.3IONS-SCNC: 15.3 MMOL/L (ref 5–15)
ANION GAP SERPL CALCULATED.3IONS-SCNC: 24.1 MMOL/L (ref 5–15)
AST SERPL-CCNC: 32 U/L (ref 1–32)
ATMOSPHERIC PRESS: 743.5 MMHG
BASE EXCESS BLDV CALC-SCNC: -13.6 MMOL/L (ref -2–2)
BASOPHILS # BLD AUTO: 0.1 10*3/MM3 (ref 0–0.2)
BASOPHILS NFR BLD AUTO: 0.6 % (ref 0–1.5)
BILIRUB SERPL-MCNC: 1.4 MG/DL (ref 0–1.2)
BUN SERPL-MCNC: 23.1 MG/DL (ref 8–23)
BUN SERPL-MCNC: 25.1 MG/DL (ref 8–23)
BUN/CREAT SERPL: 15.2 (ref 7–25)
BUN/CREAT SERPL: 18.3 (ref 7–25)
CALCIUM SPEC-SCNC: 8.3 MG/DL (ref 8.6–10.5)
CALCIUM SPEC-SCNC: 9 MG/DL (ref 8.6–10.5)
CHLORIDE SERPL-SCNC: 101 MMOL/L (ref 98–107)
CHLORIDE SERPL-SCNC: 107 MMOL/L (ref 98–107)
CO2 SERPL-SCNC: 11.9 MMOL/L (ref 22–29)
CO2 SERPL-SCNC: 16.7 MMOL/L (ref 22–29)
CREAT SERPL-MCNC: 1.37 MG/DL (ref 0.57–1)
CREAT SERPL-MCNC: 1.52 MG/DL (ref 0.57–1)
D-LACTATE SERPL-SCNC: 3.1 MMOL/L (ref 0.5–2)
D-LACTATE SERPL-SCNC: 3.4 MMOL/L (ref 0.5–2)
D-LACTATE SERPL-SCNC: 5.8 MMOL/L (ref 0.5–2)
D-LACTATE SERPL-SCNC: 9.3 MMOL/L (ref 0.5–2)
DEPRECATED RDW RBC AUTO: 45.1 FL (ref 37–54)
EGFRCR SERPLBLD CKD-EPI 2021: 37.2 ML/MIN/1.73
EGFRCR SERPLBLD CKD-EPI 2021: 42.1 ML/MIN/1.73
EOSINOPHIL # BLD AUTO: 0.1 10*3/MM3 (ref 0–0.4)
EOSINOPHIL NFR BLD AUTO: 0.6 % (ref 0.3–6.2)
ERYTHROCYTE [DISTWIDTH] IN BLOOD BY AUTOMATED COUNT: 13.1 % (ref 12.3–15.4)
GEN 5 1HR TROPONIN T REFLEX: 71 NG/L
GLOBULIN UR ELPH-MCNC: 3 GM/DL
GLUCOSE BLDC GLUCOMTR-MCNC: 149 MG/DL (ref 70–99)
GLUCOSE SERPL-MCNC: 167 MG/DL (ref 65–99)
GLUCOSE SERPL-MCNC: 310 MG/DL (ref 65–99)
HBA1C MFR BLD: 5.6 % (ref 4.8–5.6)
HCO3 BLDV-SCNC: 14.4 MMOL/L (ref 22–26)
HCT VFR BLD AUTO: 53.8 % (ref 34–46.6)
HGB BLD-MCNC: 17.5 G/DL (ref 12–15.9)
HGB BLDA-MCNC: 15.1 G/DL (ref 12–18)
HOLD SPECIMEN: NORMAL
HOLD SPECIMEN: NORMAL
IMM GRANULOCYTES # BLD AUTO: 0.13 10*3/MM3 (ref 0–0.05)
IMM GRANULOCYTES NFR BLD AUTO: 0.8 % (ref 0–0.5)
LIPASE SERPL-CCNC: 65 U/L (ref 13–60)
LYMPHOCYTES # BLD AUTO: 4.09 10*3/MM3 (ref 0.7–3.1)
LYMPHOCYTES NFR BLD AUTO: 24.7 % (ref 19.6–45.3)
MAGNESIUM SERPL-MCNC: 1.8 MG/DL (ref 1.6–2.4)
MAGNESIUM SERPL-MCNC: 2.3 MG/DL (ref 1.6–2.4)
MCH RBC QN AUTO: 30.6 PG (ref 26.6–33)
MCHC RBC AUTO-ENTMCNC: 32.5 G/DL (ref 31.5–35.7)
MCV RBC AUTO: 94.1 FL (ref 79–97)
MODALITY: ABNORMAL
MONOCYTES # BLD AUTO: 0.43 10*3/MM3 (ref 0.1–0.9)
MONOCYTES NFR BLD AUTO: 2.6 % (ref 5–12)
NEUTROPHILS NFR BLD AUTO: 11.71 10*3/MM3 (ref 1.7–7)
NEUTROPHILS NFR BLD AUTO: 70.7 % (ref 42.7–76)
NOTIFIED WHO: ABNORMAL
NRBC BLD AUTO-RTO: 0 /100 WBC (ref 0–0.2)
OSMOLALITY SERPL: 306 MOSM/KG (ref 280–301)
PCO2 BLDV: 39.9 MM HG (ref 41–51)
PH BLDV: 7.17 PH UNITS (ref 7.31–7.41)
PHOSPHATE SERPL-MCNC: 2.8 MG/DL (ref 2.5–4.5)
PHOSPHATE SERPL-MCNC: 4.3 MG/DL (ref 2.5–4.5)
PLATELET # BLD AUTO: 283 10*3/MM3 (ref 140–450)
PMV BLD AUTO: 10.2 FL (ref 6–12)
PO2 BLDV: 40.4 MM HG (ref 35–42)
POTASSIUM SERPL-SCNC: 3.6 MMOL/L (ref 3.5–5.2)
POTASSIUM SERPL-SCNC: 4.1 MMOL/L (ref 3.5–5.2)
PROT SERPL-MCNC: 7 G/DL (ref 6–8.5)
RBC # BLD AUTO: 5.72 10*6/MM3 (ref 3.77–5.28)
SAO2 % BLDCOV: 62 % (ref 45–75)
SODIUM SERPL-SCNC: 137 MMOL/L (ref 136–145)
SODIUM SERPL-SCNC: 139 MMOL/L (ref 136–145)
TROPONIN T % DELTA: 145
TROPONIN T NUMERIC DELTA: 42 NG/L
TROPONIN T SERPL HS-MCNC: 29 NG/L
WBC NRBC COR # BLD AUTO: 16.56 10*3/MM3 (ref 3.4–10.8)
WHOLE BLOOD HOLD COAG: NORMAL
WHOLE BLOOD HOLD SPECIMEN: NORMAL

## 2025-06-26 PROCEDURE — 94799 UNLISTED PULMONARY SVC/PX: CPT

## 2025-06-26 PROCEDURE — 83036 HEMOGLOBIN GLYCOSYLATED A1C: CPT | Performed by: EMERGENCY MEDICINE

## 2025-06-26 PROCEDURE — 36415 COLL VENOUS BLD VENIPUNCTURE: CPT | Performed by: EMERGENCY MEDICINE

## 2025-06-26 PROCEDURE — 83735 ASSAY OF MAGNESIUM: CPT | Performed by: EMERGENCY MEDICINE

## 2025-06-26 PROCEDURE — 85025 COMPLETE CBC W/AUTO DIFF WBC: CPT | Performed by: EMERGENCY MEDICINE

## 2025-06-26 PROCEDURE — 25810000003 LACTATED RINGERS SOLUTION: Performed by: HOSPITALIST

## 2025-06-26 PROCEDURE — 25810000003 LACTATED RINGERS PER 1000 ML: Performed by: HOSPITALIST

## 2025-06-26 PROCEDURE — 74177 CT ABD & PELVIS W/CONTRAST: CPT

## 2025-06-26 PROCEDURE — 84100 ASSAY OF PHOSPHORUS: CPT | Performed by: EMERGENCY MEDICINE

## 2025-06-26 PROCEDURE — 97110 THERAPEUTIC EXERCISES: CPT | Performed by: OCCUPATIONAL THERAPIST

## 2025-06-26 PROCEDURE — 82948 REAGENT STRIP/BLOOD GLUCOSE: CPT

## 2025-06-26 PROCEDURE — 99223 1ST HOSP IP/OBS HIGH 75: CPT | Performed by: HOSPITALIST

## 2025-06-26 PROCEDURE — 36415 COLL VENOUS BLD VENIPUNCTURE: CPT

## 2025-06-26 PROCEDURE — 25510000001 IOPAMIDOL PER 1 ML: Performed by: EMERGENCY MEDICINE

## 2025-06-26 PROCEDURE — 25810000003 LACTATED RINGERS SOLUTION: Performed by: EMERGENCY MEDICINE

## 2025-06-26 PROCEDURE — 80053 COMPREHEN METABOLIC PANEL: CPT | Performed by: EMERGENCY MEDICINE

## 2025-06-26 PROCEDURE — 82803 BLOOD GASES ANY COMBINATION: CPT

## 2025-06-26 PROCEDURE — 83930 ASSAY OF BLOOD OSMOLALITY: CPT | Performed by: EMERGENCY MEDICINE

## 2025-06-26 PROCEDURE — 99222 1ST HOSP IP/OBS MODERATE 55: CPT | Performed by: INTERNAL MEDICINE

## 2025-06-26 PROCEDURE — 94761 N-INVAS EAR/PLS OXIMETRY MLT: CPT

## 2025-06-26 PROCEDURE — 83690 ASSAY OF LIPASE: CPT | Performed by: EMERGENCY MEDICINE

## 2025-06-26 PROCEDURE — 82009 KETONE BODYS QUAL: CPT | Performed by: EMERGENCY MEDICINE

## 2025-06-26 PROCEDURE — 25010000002 POTASSIUM CHLORIDE PER 2 MEQ: Performed by: EMERGENCY MEDICINE

## 2025-06-26 PROCEDURE — 25010000002 PIPERACILLIN SOD-TAZOBACTAM PER 1 G: Performed by: EMERGENCY MEDICINE

## 2025-06-26 PROCEDURE — 25810000003 SODIUM CHLORIDE 0.9 % SOLUTION: Performed by: EMERGENCY MEDICINE

## 2025-06-26 PROCEDURE — 84484 ASSAY OF TROPONIN QUANT: CPT | Performed by: EMERGENCY MEDICINE

## 2025-06-26 PROCEDURE — 99291 CRITICAL CARE FIRST HOUR: CPT

## 2025-06-26 PROCEDURE — 83605 ASSAY OF LACTIC ACID: CPT | Performed by: EMERGENCY MEDICINE

## 2025-06-26 PROCEDURE — 71045 X-RAY EXAM CHEST 1 VIEW: CPT

## 2025-06-26 RX ORDER — DEXTROSE MONOHYDRATE, SODIUM CHLORIDE, AND POTASSIUM CHLORIDE 50; 1.49; 9 G/1000ML; G/1000ML; G/1000ML
125 INJECTION, SOLUTION INTRAVENOUS CONTINUOUS PRN
Status: DISCONTINUED | OUTPATIENT
Start: 2025-06-26 | End: 2025-06-26

## 2025-06-26 RX ORDER — NICOTINE POLACRILEX 4 MG
15 LOZENGE BUCCAL
Status: DISCONTINUED | OUTPATIENT
Start: 2025-06-26 | End: 2025-06-26

## 2025-06-26 RX ORDER — BISACODYL 10 MG
10 SUPPOSITORY, RECTAL RECTAL DAILY PRN
Status: DISCONTINUED | OUTPATIENT
Start: 2025-06-26 | End: 2025-06-28 | Stop reason: HOSPADM

## 2025-06-26 RX ORDER — VENLAFAXINE HYDROCHLORIDE 75 MG/1
225 CAPSULE, EXTENDED RELEASE ORAL DAILY
Status: DISCONTINUED | OUTPATIENT
Start: 2025-06-26 | End: 2025-06-27

## 2025-06-26 RX ORDER — DEXTROSE MONOHYDRATE AND SODIUM CHLORIDE 5; .45 G/100ML; G/100ML
125 INJECTION, SOLUTION INTRAVENOUS CONTINUOUS PRN
Status: DISCONTINUED | OUTPATIENT
Start: 2025-06-26 | End: 2025-06-26

## 2025-06-26 RX ORDER — SODIUM CHLORIDE 450 MG/100ML
200 INJECTION, SOLUTION INTRAVENOUS CONTINUOUS PRN
Status: DISCONTINUED | OUTPATIENT
Start: 2025-06-26 | End: 2025-06-26

## 2025-06-26 RX ORDER — SODIUM CHLORIDE 0.9 % (FLUSH) 0.9 %
10 SYRINGE (ML) INJECTION EVERY 12 HOURS SCHEDULED
Status: DISCONTINUED | OUTPATIENT
Start: 2025-06-26 | End: 2025-06-28 | Stop reason: HOSPADM

## 2025-06-26 RX ORDER — ACETAMINOPHEN 650 MG/1
650 SUPPOSITORY RECTAL EVERY 4 HOURS PRN
Status: DISCONTINUED | OUTPATIENT
Start: 2025-06-26 | End: 2025-06-28 | Stop reason: HOSPADM

## 2025-06-26 RX ORDER — ATORVASTATIN CALCIUM 20 MG/1
20 TABLET, FILM COATED ORAL DAILY
Status: DISCONTINUED | OUTPATIENT
Start: 2025-06-26 | End: 2025-06-27

## 2025-06-26 RX ORDER — ONDANSETRON 2 MG/ML
4 INJECTION INTRAMUSCULAR; INTRAVENOUS EVERY 6 HOURS PRN
Status: DISCONTINUED | OUTPATIENT
Start: 2025-06-26 | End: 2025-06-28 | Stop reason: HOSPADM

## 2025-06-26 RX ORDER — IOPAMIDOL 755 MG/ML
100 INJECTION, SOLUTION INTRAVASCULAR
Status: COMPLETED | OUTPATIENT
Start: 2025-06-26 | End: 2025-06-26

## 2025-06-26 RX ORDER — ONDANSETRON 4 MG/1
4 TABLET, ORALLY DISINTEGRATING ORAL EVERY 6 HOURS PRN
Status: DISCONTINUED | OUTPATIENT
Start: 2025-06-26 | End: 2025-06-28 | Stop reason: HOSPADM

## 2025-06-26 RX ORDER — SODIUM CHLORIDE AND POTASSIUM CHLORIDE 150; 450 MG/100ML; MG/100ML
200 INJECTION, SOLUTION INTRAVENOUS CONTINUOUS PRN
Status: DISCONTINUED | OUTPATIENT
Start: 2025-06-26 | End: 2025-06-26

## 2025-06-26 RX ORDER — SODIUM CHLORIDE AND POTASSIUM CHLORIDE 300; 900 MG/100ML; MG/100ML
200 INJECTION, SOLUTION INTRAVENOUS CONTINUOUS PRN
Status: DISCONTINUED | OUTPATIENT
Start: 2025-06-26 | End: 2025-06-26

## 2025-06-26 RX ORDER — AMOXICILLIN 250 MG
2 CAPSULE ORAL 2 TIMES DAILY
Status: DISCONTINUED | OUTPATIENT
Start: 2025-06-26 | End: 2025-06-28 | Stop reason: HOSPADM

## 2025-06-26 RX ORDER — SODIUM CHLORIDE 9 MG/ML
40 INJECTION, SOLUTION INTRAVENOUS AS NEEDED
Status: DISCONTINUED | OUTPATIENT
Start: 2025-06-26 | End: 2025-06-26

## 2025-06-26 RX ORDER — ACETAMINOPHEN 325 MG/1
650 TABLET ORAL EVERY 4 HOURS PRN
Status: DISCONTINUED | OUTPATIENT
Start: 2025-06-26 | End: 2025-06-28 | Stop reason: HOSPADM

## 2025-06-26 RX ORDER — IBUPROFEN 600 MG/1
1 TABLET ORAL
Status: DISCONTINUED | OUTPATIENT
Start: 2025-06-26 | End: 2025-06-26

## 2025-06-26 RX ORDER — SODIUM CHLORIDE 0.9 % (FLUSH) 0.9 %
10 SYRINGE (ML) INJECTION AS NEEDED
Status: DISCONTINUED | OUTPATIENT
Start: 2025-06-26 | End: 2025-06-28 | Stop reason: HOSPADM

## 2025-06-26 RX ORDER — SODIUM CHLORIDE 9 MG/ML
200 INJECTION, SOLUTION INTRAVENOUS CONTINUOUS PRN
Status: DISCONTINUED | OUTPATIENT
Start: 2025-06-26 | End: 2025-06-26

## 2025-06-26 RX ORDER — PANTOPRAZOLE SODIUM 40 MG/1
40 TABLET, DELAYED RELEASE ORAL
Status: CANCELLED | OUTPATIENT
Start: 2025-06-27

## 2025-06-26 RX ORDER — SODIUM CHLORIDE AND POTASSIUM CHLORIDE 150; 900 MG/100ML; MG/100ML
200 INJECTION, SOLUTION INTRAVENOUS CONTINUOUS PRN
Status: DISCONTINUED | OUTPATIENT
Start: 2025-06-26 | End: 2025-06-26

## 2025-06-26 RX ORDER — DEXTROSE MONOHYDRATE, SODIUM CHLORIDE, AND POTASSIUM CHLORIDE 50; 2.98; 9 G/1000ML; G/1000ML; G/1000ML
125 INJECTION, SOLUTION INTRAVENOUS CONTINUOUS PRN
Status: DISCONTINUED | OUTPATIENT
Start: 2025-06-26 | End: 2025-06-26

## 2025-06-26 RX ORDER — DEXTROSE MONOHYDRATE, SODIUM CHLORIDE, AND POTASSIUM CHLORIDE 50; 1.49; 4.5 G/1000ML; G/1000ML; G/1000ML
125 INJECTION, SOLUTION INTRAVENOUS CONTINUOUS PRN
Status: DISCONTINUED | OUTPATIENT
Start: 2025-06-26 | End: 2025-06-26

## 2025-06-26 RX ORDER — SODIUM CHLORIDE, SODIUM LACTATE, POTASSIUM CHLORIDE, CALCIUM CHLORIDE 600; 310; 30; 20 MG/100ML; MG/100ML; MG/100ML; MG/100ML
125 INJECTION, SOLUTION INTRAVENOUS CONTINUOUS
Status: ACTIVE | OUTPATIENT
Start: 2025-06-26 | End: 2025-06-27

## 2025-06-26 RX ORDER — SODIUM CHLORIDE 0.9 % (FLUSH) 0.9 %
10 SYRINGE (ML) INJECTION AS NEEDED
Status: DISCONTINUED | OUTPATIENT
Start: 2025-06-26 | End: 2025-06-26

## 2025-06-26 RX ORDER — PRAZOSIN HYDROCHLORIDE 1 MG/1
2 CAPSULE ORAL NIGHTLY
Status: DISCONTINUED | OUTPATIENT
Start: 2025-06-26 | End: 2025-06-28 | Stop reason: HOSPADM

## 2025-06-26 RX ORDER — IBUPROFEN 600 MG/1
1 TABLET ORAL
Status: DISCONTINUED | OUTPATIENT
Start: 2025-06-26 | End: 2025-06-28 | Stop reason: HOSPADM

## 2025-06-26 RX ORDER — FAMOTIDINE 20 MG/1
20 TABLET, FILM COATED ORAL
Status: DISCONTINUED | OUTPATIENT
Start: 2025-06-27 | End: 2025-06-28 | Stop reason: HOSPADM

## 2025-06-26 RX ORDER — BISACODYL 5 MG/1
5 TABLET, DELAYED RELEASE ORAL DAILY PRN
Status: DISCONTINUED | OUTPATIENT
Start: 2025-06-26 | End: 2025-06-28 | Stop reason: HOSPADM

## 2025-06-26 RX ORDER — DEXTROSE MONOHYDRATE, SODIUM CHLORIDE, AND POTASSIUM CHLORIDE 50; 2.98; 4.5 G/1000ML; G/1000ML; G/1000ML
125 INJECTION, SOLUTION INTRAVENOUS CONTINUOUS PRN
Status: DISCONTINUED | OUTPATIENT
Start: 2025-06-26 | End: 2025-06-26

## 2025-06-26 RX ORDER — INSULIN LISPRO 100 [IU]/ML
2-7 INJECTION, SOLUTION INTRAVENOUS; SUBCUTANEOUS
Status: DISCONTINUED | OUTPATIENT
Start: 2025-06-26 | End: 2025-06-28 | Stop reason: HOSPADM

## 2025-06-26 RX ORDER — ENOXAPARIN SODIUM 100 MG/ML
40 INJECTION SUBCUTANEOUS DAILY
Status: DISCONTINUED | OUTPATIENT
Start: 2025-06-26 | End: 2025-06-28 | Stop reason: HOSPADM

## 2025-06-26 RX ORDER — SODIUM CHLORIDE 9 MG/ML
40 INJECTION, SOLUTION INTRAVENOUS AS NEEDED
Status: DISCONTINUED | OUTPATIENT
Start: 2025-06-26 | End: 2025-06-28 | Stop reason: HOSPADM

## 2025-06-26 RX ORDER — DEXTROSE MONOHYDRATE 25 G/50ML
25 INJECTION, SOLUTION INTRAVENOUS
Status: DISCONTINUED | OUTPATIENT
Start: 2025-06-26 | End: 2025-06-28 | Stop reason: HOSPADM

## 2025-06-26 RX ORDER — POLYETHYLENE GLYCOL 3350 17 G/17G
17 POWDER, FOR SOLUTION ORAL DAILY PRN
Status: DISCONTINUED | OUTPATIENT
Start: 2025-06-26 | End: 2025-06-28 | Stop reason: HOSPADM

## 2025-06-26 RX ORDER — NICOTINE POLACRILEX 4 MG
15 LOZENGE BUCCAL
Status: DISCONTINUED | OUTPATIENT
Start: 2025-06-26 | End: 2025-06-28 | Stop reason: HOSPADM

## 2025-06-26 RX ORDER — DEXTROSE MONOHYDRATE 25 G/50ML
10-50 INJECTION, SOLUTION INTRAVENOUS
Status: DISCONTINUED | OUTPATIENT
Start: 2025-06-26 | End: 2025-06-26

## 2025-06-26 RX ORDER — DEXTROSE MONOHYDRATE AND SODIUM CHLORIDE 5; .9 G/100ML; G/100ML
125 INJECTION, SOLUTION INTRAVENOUS CONTINUOUS PRN
Status: DISCONTINUED | OUTPATIENT
Start: 2025-06-26 | End: 2025-06-26

## 2025-06-26 RX ORDER — NITROGLYCERIN 0.4 MG/1
0.4 TABLET SUBLINGUAL
Status: DISCONTINUED | OUTPATIENT
Start: 2025-06-26 | End: 2025-06-28 | Stop reason: HOSPADM

## 2025-06-26 RX ADMIN — Medication 10 ML: at 20:45

## 2025-06-26 RX ADMIN — INSULIN HUMAN 2.5 UNITS/HR: 1 INJECTION, SOLUTION INTRAVENOUS at 16:03

## 2025-06-26 RX ADMIN — VENLAFAXINE HYDROCHLORIDE 225 MG: 150 CAPSULE, EXTENDED RELEASE ORAL at 20:44

## 2025-06-26 RX ADMIN — SODIUM CHLORIDE, POTASSIUM CHLORIDE, SODIUM LACTATE AND CALCIUM CHLORIDE 125 ML/HR: 600; 310; 30; 20 INJECTION, SOLUTION INTRAVENOUS at 17:45

## 2025-06-26 RX ADMIN — SODIUM CHLORIDE AND POTASSIUM CHLORIDE 200 ML/HR: 150; 450 INJECTION, SOLUTION INTRAVENOUS at 15:59

## 2025-06-26 RX ADMIN — SODIUM CHLORIDE, POTASSIUM CHLORIDE, SODIUM LACTATE AND CALCIUM CHLORIDE 500 ML: 600; 310; 30; 20 INJECTION, SOLUTION INTRAVENOUS at 17:45

## 2025-06-26 RX ADMIN — PRAZOSIN HYDROCHLORIDE 2 MG: 1 CAPSULE ORAL at 20:44

## 2025-06-26 RX ADMIN — IOPAMIDOL 100 ML: 755 INJECTION, SOLUTION INTRAVENOUS at 14:30

## 2025-06-26 RX ADMIN — MUPIROCIN 1 APPLICATION: 20 OINTMENT TOPICAL at 20:45

## 2025-06-26 RX ADMIN — PIPERACILLIN AND TAZOBACTAM 4.5 G: 4; .5 INJECTION, POWDER, FOR SOLUTION INTRAVENOUS; PARENTERAL at 15:58

## 2025-06-26 RX ADMIN — ATORVASTATIN CALCIUM 20 MG: 20 TABLET, FILM COATED ORAL at 20:45

## 2025-06-26 RX ADMIN — SODIUM CHLORIDE, POTASSIUM CHLORIDE, SODIUM LACTATE AND CALCIUM CHLORIDE 1000 ML: 600; 310; 30; 20 INJECTION, SOLUTION INTRAVENOUS at 14:43

## 2025-06-26 RX ADMIN — SODIUM CHLORIDE 1000 ML: 9 INJECTION, SOLUTION INTRAVENOUS at 13:51

## 2025-06-26 NOTE — H&P
[facility] ERIST HISTORY AND PHYSICAL  Date: 2025   Patient Name: Suyapa Barrera  : 1955  MRN: [id number]  Primary Care Physician:  Robin Cavazos DO  Date of admission: 2025    Subjective abdominal pain, diarrhea, weakness  Subjective   Chief Complaint: Abdominal pain, diarrhea, weakness    HPI:  Suyapa Barrera is a 70 y.o. female with a past medical history of depression, hypertension, hyperlipidemia who presents to the emergency room with diffuse abdominal pain and diarrhea.  Prior to today patient was asymptomatic.  She has had multiple bouts of diarrhea was brought to the ED.  Patient is taking Mounjaro for 1.5 years.  She had a similar episode 1 year ago. Patient had an ultrasound guided biopsy of her thyroid on 2025.    On arrival to the ED, patient temperature 98.1, pulse of 87, respiratory rate of 18, blood pressure 96/57, and she was saturating 96% on room air.  Chest x-ray is clear.    CT abdomen and pelvis with contrast: Suggestion of mild colonic wall thickening and mucosal hyperenhancement with mild colonic fluid.  Infectious or inflammatory colitis may have this appearance.  Portosystemic venous collateral noted within the left abdomen draining to the left renal vein.  Probable 2.6 cm uterine fibroid.    AB.1/39.9/40.4/14.4.  Initial troponin was 29, second troponin was 71.  Delta T of 42.  Patient denies any chest pain.  Patient sodium is 139, CO2 is 16.7, anion gap is 15.3, creatinine is 1.37, glucose is 167.  Calcium is 8.3.  Initial lactic was 9.3, follow-up lactic was 5.8.  White blood cell count is 16.56, hemoglobin is 17.5.      Patient has had carpal tunnel surgery.  Personal History     Past Medical History:  Past Medical History:   Diagnosis Date    Arthritis 2023    Callus     Depression 2019    Diverticulosis 2016    Fracture, foot     Lisfranc injury     Full thickness rotator cuff tear 2022    Hyperlipidemia 2011     Hypertension 2011    Low back pain 2024    Low back strain     Plantar fasciitis 2017    Rotator cuff syndrome     Surgery     Sleep apnea 2012    New sleep study to be performed on 3/20/2023    Tear of meniscus of knee     Surgery in ,  and     Type 2 diabetes mellitus with hyperglycemia, without long-term current use of insulin 2022       Past Surgical History:  Past Surgical History:   Procedure Laterality Date    ABDOMINOPLASTY      tummy tuck    BREAST SURGERY  1998    Bilateral reduction    COLONOSCOPY  Dec 2016    COLONOSCOPY N/A 2024    Procedure: COLONOSCOPY WITH COLD AND HOT SNARE POLYPECTOMIES, ELEVIEW INJECTION, CLIP APPLICATION X 1;  Surgeon: Fer Dale MD;  Location: [facility] ENDOSCOPY;  Service: Gastroenterology;  Laterality: N/A;  COLON POLYPS    EYE SURGERY  Aug 2019    Lasik    HAND SURGERY Left     thumb joint removal    KNEE SURGERY  ,  and     SHOULDER SURGERY      TUBAL ABDOMINAL LIGATION  Sep 1992       Family History:   Family History   Problem Relation Age of Onset    Alcohol abuse Mother             Alcohol abuse Sister     Drug abuse Sister     Alcohol abuse Brother     Drug abuse Brother     Early death Maternal Grandmother          at 42 of heart attack    Alcohol abuse Maternal Grandfather             Early death Maternal Grandfather          at 49 of Heart attack    Colon cancer Neg Hx        Social History:   Social History     Socioeconomic History    Marital status: Single   Tobacco Use    Smoking status: Never     Passive exposure: Never    Smokeless tobacco: Never   Vaping Use    Vaping status: Never Used   Substance and Sexual Activity    Alcohol use: Yes     Alcohol/week: 1.0 standard drink of alcohol     Types: 1 Glasses of wine per week     Comment: Maybe one drink a month    Drug use: Never    Sexual activity: Not Currently     Partners: Male     Birth control/protection: Tubal  ligation       Home Medications:  Tirzepatide, lisinopril, omeprazole, ondansetron ODT, simvastatin, and venlafaxine XR    Allergies:  Allergies   Allergen Reactions    Ozempic (0.25 Or 0.5 Mg-Dose) [Semaglutide(0.25 Or 0.5mg-Dos)] Shortness Of Breath and GI Intolerance    Azithromycin GI Intolerance       Review of Systems   All systems were reviewed and negative except for: diarrhea and abdominal pain     Objective   Objective     Vitals:   Temp:  [98.1 °F (36.7 °C)] 98.1 °F (36.7 °C)  Heart Rate:  [] 77  Resp:  [18] 18  BP: ()/(57-62) 105/62    Physical Exam    Constitutional: Awake, alert, no acute distress   Eyes: Pupils equal, sclerae anicteric, no conjunctival injection   HENT: NCAT, mucous membranes moist   Neck: Supple, no thyromegaly, no lymphadenopathy, trachea midline   Respiratory: Clear to auscultation bilaterally, nonlabored respirations    Cardiovascular: RRR, no murmurs, rubs, or gallops, palpable pedal pulses bilaterally   Gastrointestinal: Positive bowel sounds, soft, nontender, nondistended   Musculoskeletal: No bilateral ankle edema, no clubbing or cyanosis to extremities   Psychiatric: Appropriate affect, cooperative   Neurologic: Oriented x 3, strength symmetric in all extremities, Cranial Nerves grossly intact to confrontation, speech clear   Skin: No rashes     Result Review    Result Review:  I have personally reviewed the results from the time of this admission to 6/26/2025 17:24 EDT and agree with these findings:  [x]  Laboratory  [x]  Microbiology  [x]  Radiology  [x]  EKG/Telemetry   [x]  Cardiology/Vascular   [x]  Pathology  [x]  Old records  [x]  Other:      Assessment & Plan   Assessment / Plan   #1 Colitis   - Started on Zosyn in the ED.  Will continue for now.  Seen by surgery.  Symptoms felt to be secondary to GLP-1 toxicity.  Aggressive hydration.  - Will also check C. difficile, enteric stool panel.  - Supportive care  -CLD.     #2 high anion gap metabolic acidosis  could be secondary to diarrhea, hypotension.  - Aggressive fluid resuscitation.  Lactic acid is improving.    #3 depression, PTSD  - Continue Effexor.  Patient is on prazosin at home.    #4 GERD  -Will hold Protonix cover with Pepcid.    #5 well-controlled diabetes  -Patient was prescribed Mounjaro for weight management.  -low intensity ISS ordered.     #6 hyperlipidemia continue Zocor    #7 hypertension hold lisinopril.  Patient's blood pressure is on the lower side.    #8 LEONELA likely prerenal.  - Should improve with fluids.    #9 polycythemia and leukocytosis likely hemoconcentration component.      VTE Prophylaxis:  Pharmacologic VTE prophylaxis orders are signed & held.          CODE STATUS:    Code Status (Patient has no pulse and is not breathing): CPR (Attempt to Resuscitate)  Medical Interventions (Patient has pulse or is breathing): Full Support  Level Of Support Discussed With: Patient      Admission Status:  I believe this patient meets inpatient status.    Electronically signed by Cesia Olvera DO, 06/26/25, 5:24 PM EDT.

## 2025-06-26 NOTE — CONSULTS
Intensive Care Admission Note     DKA (diabetic ketoacidosis)    History of Present Illness     70-year-old female with past medical history of colitis, hyperlipidemia, hypertension, type 2 diabetes, constipation, and obstructive sleep apnea presented to the ED with abdominal pain, nausea and vomiting.  She reports she was at physical therapy and began having severe abdominal pain as well as nausea vomiting and diarrhea.  She states she passed out several times in the bathroom.  Of note patient is taking Mounjaro.  Patient labs were remarkable for elevated anion gap metabolic acidosis, LEONELA with creatinine of 1.37, lactic acid of 9.3 and elevated WBC of 16.5 and hemoconcentration with hemoglobin of 17.5.  CT scan of the abdomen pelvis showed mild colonic wall thickening and mucosal hyperenhancement.  General surgery was consulted in the ED given significantly elevated lactate and colitis like picture.    Problem List, Surgical History, Family, Social History, and ROS     Past Medical History:   Diagnosis Date    Arthritis 4/2023    Callus     Depression 09/2019    Diverticulosis 12/2016    Fracture, foot     Lisfranc injury 5/21    Full thickness rotator cuff tear 08/04/2022    Hyperlipidemia October 2011    Hypertension October 2011    Low back pain 01/2024    Low back strain 5/16    Plantar fasciitis 03/2017    Rotator cuff syndrome     Surgery 4/14    Sleep apnea July 2012    New sleep study to be performed on 3/20/2023    Tear of meniscus of knee     Surgery in 91, 92 and 2013    Type 2 diabetes mellitus with hyperglycemia, without long-term current use of insulin 08/03/2022      Past Surgical History:   Procedure Laterality Date    ABDOMINOPLASTY      tummy tuck    BREAST SURGERY  Nov 1998    Bilateral reduction    COLONOSCOPY  Dec 2016    COLONOSCOPY N/A 09/03/2024    Procedure: COLONOSCOPY WITH COLD AND HOT SNARE POLYPECTOMIES, ELEVIEW INJECTION, CLIP APPLICATION X 1;  Surgeon: Fer Dale MD;   Location: [facility] ENDOSCOPY;  Service: Gastroenterology;  Laterality: N/A;  COLON POLYPS    EYE SURGERY  Aug 2019    Lasik    HAND SURGERY Left     thumb joint removal    KNEE SURGERY  ,  and     SHOULDER SURGERY      TUBAL ABDOMINAL LIGATION  Sep 1992       Allergies   Allergen Reactions    Ozempic (0.25 Or 0.5 Mg-Dose) [Semaglutide(0.25 Or 0.5mg-Dos)] Shortness Of Breath and GI Intolerance    Azithromycin GI Intolerance     No current facility-administered medications on file prior to encounter.     Current Outpatient Medications on File Prior to Encounter   Medication Sig    lisinopril (PRINIVIL,ZESTRIL) 30 MG tablet Take 1 tablet by mouth Every Other Day.    Mounjaro 2.5 MG/0.5ML solution auto-injector Inject 2.5 mg under the skin into the appropriate area as directed 1 (One) Time Per Week. WED    omeprazole (priLOSEC) 40 MG capsule Take 1 capsule by mouth Daily.    ondansetron ODT (ZOFRAN-ODT) 8 MG disintegrating tablet Place 1 tablet on the tongue Every 8 (Eight) Hours As Needed for Nausea or Vomiting.    simvastatin (ZOCOR) 40 MG tablet Take 1 tablet by mouth Every Night.    venlafaxine XR (EFFEXOR-XR) 150 MG 24 hr capsule Take 2 capsules by mouth Daily.     MEDICATION LIST AND ALLERGIES REVIEWED.    Family History   Problem Relation Age of Onset    Alcohol abuse Mother             Alcohol abuse Sister     Drug abuse Sister     Alcohol abuse Brother     Drug abuse Brother     Early death Maternal Grandmother          at 42 of heart attack    Alcohol abuse Maternal Grandfather             Early death Maternal Grandfather          at 49 of Heart attack    Colon cancer Neg Hx      Social History     Tobacco Use    Smoking status: Never     Passive exposure: Never    Smokeless tobacco: Never   Vaping Use    Vaping status: Never Used   Substance Use Topics    Alcohol use: Yes     Alcohol/week: 1.0 standard drink of alcohol     Types: 1 Glasses of wine per week     Comment: Maybe  "one drink a month    Drug use: Never     Social History     Social History Narrative    Not on file     FAMILY AND SOCIAL HISTORY REVIEWED.    ALL OTHER SYSTEMS REVIEWED AND ARE NEGATIVE.    Physical Exam and Clinical Information   /62 Comment: Simultaneous filing. User may not have seen previous data.  Pulse 104   Temp 98.1 °F (36.7 °C) (Oral)   Resp 18   Ht 162.6 cm (64\")   Wt 80 kg (176 lb 5.9 oz)   SpO2 98%   BMI 30.27 kg/m²     Physical exam  General : Aox3. In no acute distress. Well developed  Neuro: CN 2-12 grossly intact, moves bilateral upper and lower extremities.  HEENT : AT,NC,PERRLA,No pallor, No Icterus,No thyromegaly. No JVD.  CVS : RRR, No M/R/G. B/l UE and LE palpable pulses +  Resp/Chest: B/l ant VBS+. No wheeze/rales  Abd: Soft, Non distended, No tenderness, No organomegaly. Bowel sounds +  EXT: NO edema.   SKIN : NO palpable skin lesions/rashes noted.    Results from last 7 days   Lab Units 06/26/25  1325   WBC 10*3/mm3 16.56*   HEMOGLOBIN g/dL 17.5*   PLATELETS 10*3/mm3 283     Results from last 7 days   Lab Units 06/26/25  1556 06/26/25  1350   SODIUM mmol/L 139 137   POTASSIUM mmol/L 4.1 3.6   CO2 mmol/L 16.7* 11.9*   BUN mg/dL 25.1* 23.1*   CREATININE mg/dL 1.37* 1.52*   MAGNESIUM mg/dL 1.8 2.3   PHOSPHORUS mg/dL 2.8 4.3   GLUCOSE mg/dL 167* 310*     Estimated Creatinine Clearance: 40.2 mL/min (A) (by C-G formula based on SCr of 1.37 mg/dL (H)).          Lab Results   Component Value Date    LACTATE 5.8 (C) 06/26/2025        Images: CT abdomen and pelvis personally reviewed.  Mild colitis noted.  No perforation or any signs of fat stranding noted    I reviewed the patient's results and images.     Impression   Acute colitis 2/2 GLP 1 toxicity  Elevated anion gap metabolic acidosis 2/2 lactic acidosis and LEONELA  Acute kidney injury  Hypovolemia and dehydration 2/2 emesis and diarrhea  History of GERD  History of type 2 diabetes mellitus  Essential " hypertension      Plan/Recommendations     -Sudden onset of symptoms with multiple bouts of diarrhea and emesis and mild colitis picture on the scan with labs suggestive of hemoconcentration and elevated lactic acid is classic and consistent with very well-known side effect/toxicity of GLP-1 analog medications.  Treatment is conservative with holding the medication and IV hydration.  Lactate is also improving.  Continue to trend.  Continue IV crystalloids and monitor urine output.  LEONELA is prerenal and likely improved with hydration.  Hold antihypertensive medications.  Once lactic acid improves patient can move out of ICU.        Yaakov Perez MD   Critical Care Medicine  06/26/25 16:56 EDT

## 2025-06-26 NOTE — PROGRESS NOTES
Occupational Therapy Daily Treatment Note   [address] [place] [id number]    Patient: Suyapa Barrera   : 1955  Referring practitioner: Maadn Sandhu MD  Date of Initial Visit: Type: THERAPY  Noted: 2025  Today's Date: 2025.   Patient seen for 9 sessions    ICD-10-CM ICD-9-CM   1. Carpal tunnel syndrome of left wrist  G56.02 354.0   2. Pain of left hand  M79.642 729.5   3. Arthritis of carpometacarpal (CMC) joint of left thumb  M18.12 716.94          Suyapa Barrera reports I am feeling good with my hand, but not feeling well.  My stomach has been upset.        Functional status progressing with functional use of L hand.     Objective   See Exercise, Manual, and Modality Logs for complete treatment.   Pt tolerated strengthening exercises this date.  Pt left treatment early this date due to stomach issues.       Assessment/Plan  Reassess at next visit     Cont per POC           Timed:  Manual Therapy:    0     mins  78692;  Therapeutic Exercise:    10     mins  29000;  Therapeutic Activity:    0     mins  64167;     Neuromuscular Liu:    10    mins  87790;    Ultrasound:     0     mins  00511;    Electrical Stimulation:    0     mins  84643;    Untimed:  Electrical Stimulation:    0     mins  83209 ( );  Fluidotherapy:        0    mins  28863  Dry Needlin    mins  21095    Timed Treatment:   20   mins   Total Treatment:     20   mins    OT SIGNATURE: JAYLON Bennett, OTR/L, CHT     Electronically signed    [place] LICENSE: [id number]

## 2025-06-26 NOTE — CONSULTS
[facility]   Consult    Patient Name: Suyapa Barrera  : 1955  MRN: [id number]  Primary Care Physician:  Robin Cavazos DO  Date of admission: 2025    Subjective   Subjective     Chief Complaint: Abdominal pain, diarrhea, weakness    Consult Reason: Possible ischemic colitis    HPI: Suyapa Barrera is a 70 y.o. female who presented to the ED with acute onset diffuse abdominal pain and diarrhea when she was at physical therapy.  Prior to today patient was completely asymptomatic.  She states when her abdominal pain started she went to the bathroom and began to not feel like herself.  She had multiple bouts of diarrhea and was brought in to the ED for further evaluation by EMS.  Over the past few hours her abdominal pain has resolved.  She states she has had an episode like this once prior approximately 1 year ago, but not as severe.  Her abdominal surgical history includes a tubal ligation and an abdominoplasty.  She has no recent sick contacts and no recent travel.  She has taken Mounjaro for the past 1.5 years.    Review of Systems   Constitutional:  Positive for chills and diaphoresis.   HENT:  Negative for sore throat.    Respiratory:  Negative for shortness of breath.    Cardiovascular:  Negative for chest pain.   Gastrointestinal:  Positive for abdominal pain and diarrhea.   Genitourinary:  Negative for difficulty urinating.   Neurological:  Negative for dizziness.   Psychiatric/Behavioral:  Negative for confusion.        Personal History     Past Medical History:   Diagnosis Date    Arthritis 2023    Callus     Depression 2019    Diverticulosis 2016    Fracture, foot     Lisfranc injury     Full thickness rotator cuff tear 2022    Hyperlipidemia 2011    Hypertension 2011    Low back pain 2024    Low back strain     Plantar fasciitis 2017    Rotator cuff syndrome     Surgery     Sleep apnea 2012    New sleep study to be performed on 3/20/2023     Tear of meniscus of knee     Surgery in 91, 92 and 2013    Type 2 diabetes mellitus with hyperglycemia, without long-term current use of insulin 08/03/2022       Past Surgical History:   Procedure Laterality Date    ABDOMINOPLASTY      tummy tuck    BREAST SURGERY  Nov 1998    Bilateral reduction    COLONOSCOPY  Dec 2016    COLONOSCOPY N/A 09/03/2024    Procedure: COLONOSCOPY WITH COLD AND HOT SNARE POLYPECTOMIES, ELEVIEW INJECTION, CLIP APPLICATION X 1;  Surgeon: Fer Dale MD;  Location: [facility] ENDOSCOPY;  Service: Gastroenterology;  Laterality: N/A;  COLON POLYPS    EYE SURGERY  Aug 2019    Lasik    HAND SURGERY Left     thumb joint removal    KNEE SURGERY  91, 92 and 2014    SHOULDER SURGERY  4/14    TUBAL ABDOMINAL LIGATION  Sep 1992       Family History: family history includes Alcohol abuse in her brother, maternal grandfather, mother, and sister; Drug abuse in her brother and sister; Early death in her maternal grandfather and maternal grandmother. Otherwise pertinent FHx was reviewed and not pertinent to current issue.    Social History:  reports that she has never smoked. She has never been exposed to tobacco smoke. She has never used smokeless tobacco. She reports current alcohol use of about 1.0 standard drink of alcohol per week. She reports that she does not use drugs.    Home Medications:  Tirzepatide, lisinopril, omeprazole, ondansetron ODT, simvastatin, and venlafaxine XR    Allergies:  Allergies   Allergen Reactions    Ozempic (0.25 Or 0.5 Mg-Dose) [Semaglutide(0.25 Or 0.5mg-Dos)] Shortness Of Breath and GI Intolerance    Azithromycin GI Intolerance       Objective    Objective     Vitals:   Temp:  [98.1 °F (36.7 °C)] 98.1 °F (36.7 °C)  Heart Rate:  [83-96] 83  Resp:  [18] 18  BP: ()/(57-62) 105/62    Physical Exam  Constitutional:       Appearance: Normal appearance.   HENT:      Head: Normocephalic and atraumatic.      Mouth/Throat:      Mouth: Mucous membranes are moist.       Pharynx: Oropharynx is clear.   Cardiovascular:      Rate and Rhythm: Normal rate and regular rhythm.   Pulmonary:      Effort: Pulmonary effort is normal. No respiratory distress.   Abdominal:      Comments: Soft, nondistended, grossly nontender   Musculoskeletal:         General: No swelling. Normal range of motion.      Cervical back: Normal range of motion and neck supple.   Skin:     General: Skin is warm and dry.   Neurological:      General: No focal deficit present.      Mental Status: She is alert and oriented to person, place, and time.   Psychiatric:         Mood and Affect: Mood normal.         Behavior: Behavior normal.         Result Review    Result Review:  I have personally reviewed the results from the time of this admission to 6/26/2025 16:30 EDT and agree with these findings:  [x]  Laboratory  []  Microbiology  [x]  Radiology  []  EKG/Telemetry   []  Cardiology/Vascular   []  Pathology  []  Old records  []  Other:  Most notable findings include: Liquid stool in colon    Assessment & Plan   Assessment / Plan     Brief Patient Summary:  Suyapa Barrera is a 70 y.o. female who presents with abdominal pain, diarrhea    Active Hospital Problems:  Active Hospital Problems    Diagnosis     **DKA (diabetic ketoacidosis)        Plan:   GLP-1 toxicity  DKA  - Afebrile, vitals stable during exam, leukocytosis 16,000 but appears hemoconcentrated  - Lactic acidosis decreased from 9.3 to 5.8  - Anion gap metabolic acidosis  - Patient has benign abdomen on exam and no concerning findings on CT abdomen pelvis  - Symptoms likely secondary to her Mounjaro; recommend further evaluation by medicine team for admission  - No plans for any acute surgical intervention, please call with any questions or concerns    Electronically signed by Kris Mazariegos MD, 06/26/25, 4:30 PM EDT.

## 2025-06-26 NOTE — Clinical Note
Level of Care: Critical Care [6]   Diagnosis: DKA (diabetic ketoacidosis) [044564]   Admitting Physician: LORNE SIMMONS [[id number]]   Attending Physician: LORNE SIMMONS [[id number]]   Certification: I Certify That Inpatient Hospital Services Are Medically Necessary For Greater Than 2 Midnights

## 2025-06-26 NOTE — ED PROVIDER NOTES
5/5/2022    Patient: Jesus Bennett   YOB: 2002   Date of Visit: 5/5/2022     Tiki May MD  [address] [facility] [address]  Via Fax: [phone]    Dear Tiki May MD,      Thank you for referring Ms. Esa Arcos to [facility] for evaluation. My notes for this consultation are attached. Chief Complaint   Patient presents with    New Patient    Thyroid Problem     [facility]- [phone]. Subjective:   CC: Abnormal thyroid labs, elevated prolactin level. Reason for visit: Jesus Bennett is a 18 y.o. 5 m.o. female referred by Abida Delvalle MD   for consultation for evaluation of CC. She was present today with her mother. History of present illness:  Started on OCP since 10/2021. She went for routine follow up at [facility] and had some labs done. Labs done on 3/11/2022 came back of normal TSH of 0.889 [0.454.5], slight elevated free T4 1.68 [0.931.6], mild elevated prolactin of 26.3 [4.823.3]. Admits to nipple discharge [clear], in February 2022. None since then. Also admits to occasional constipation and heat intolerance. 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 Mother is 5'5 tall. DM: yes  Thyroid dx:MA  Celiac dx: mum     Social History:  She lives with mum  She is in 11th grade. Review of Systems:    A comprehensive review of systems was negative except for that written in the HPI. Medications:  Current Outpatient Medications   Medication Sig    etonogestreL (Nexplanon) 68 mg impl by SubDERmal route.  ALBUTEROL IN Take  by inhalation.     BUDESONIDE (PULMICORT IN) Take  by Time: 3:53 PM EDT  Date of encounter:  6/26/2025  Independent Historian/Clinical History and Information was obtained by:   Patient and Family    History is limited by: N/A    Chief Complaint: Abdominal pain diarrhea      History of Present Illness:  Patient is a 70 y.o. year old female who presents to the emergency department for evaluation of abdominal pain and diarrhea.  Patient was at physical therapy for her hand when she had sudden onset of severe abdominal pain with diarrhea.      Patient Care Team  Primary Care Provider: Robin Cavazos DO    Past Medical History:     Allergies   Allergen Reactions    Ozempic (0.25 Or 0.5 Mg-Dose) [Semaglutide(0.25 Or 0.5mg-Dos)] Shortness Of Breath and GI Intolerance    Azithromycin GI Intolerance     Past Medical History:   Diagnosis Date    Arthritis 4/2023    Callus     Depression 09/2019    Diverticulosis 12/2016    Fracture, foot     Lisfranc injury 5/21    Full thickness rotator cuff tear 08/04/2022    Hyperlipidemia October 2011    Hypertension October 2011    Low back pain 01/2024    Low back strain 5/16    Plantar fasciitis 03/2017    Rotator cuff syndrome     Surgery 4/14    Sleep apnea July 2012    New sleep study to be performed on 3/20/2023    Tear of meniscus of knee     Surgery in 91, 92 and 2013    Type 2 diabetes mellitus with hyperglycemia, without long-term current use of insulin 08/03/2022     Past Surgical History:   Procedure Laterality Date    ABDOMINOPLASTY      tummy tuck    BREAST SURGERY  Nov 1998    Bilateral reduction    COLONOSCOPY  Dec 2016    COLONOSCOPY N/A 09/03/2024    Procedure: COLONOSCOPY WITH COLD AND HOT SNARE POLYPECTOMIES, ELEVIEW INJECTION, CLIP APPLICATION X 1;  Surgeon: Fer Dale MD;  Location: [facility];  Service: Gastroenterology;  Laterality: N/A;  COLON POLYPS    EYE SURGERY  Aug 2019    Lasik    HAND SURGERY Left     thumb joint removal    KNEE SURGERY  91, 92 and 2014    SHOULDER SURGERY  4/14    TUBAL  ABDOMINAL LIGATION  Sep 1992     Family History   Problem Relation Age of Onset    Alcohol abuse Mother             Alcohol abuse Sister     Drug abuse Sister     Alcohol abuse Brother     Drug abuse Brother     Early death Maternal Grandmother          at 42 of heart attack    Alcohol abuse Maternal Grandfather             Early death Maternal Grandfather          at 49 of Heart attack    Colon cancer Neg Hx        Home Medications:  Prior to Admission medications    Medication Sig Start Date End Date Taking? Authorizing Provider   lisinopril (PRINIVIL,ZESTRIL) 30 MG tablet Take 1 tablet by mouth Every Other Day. 24  Yes Aurora Simmons DO   Mounjaro 2.5 MG/0.5ML solution auto-injector Inject 2.5 mg under the skin into the appropriate area as directed 1 (One) Time Per Week. WED 25  Yes Provider, MD Casey   omeprazole (priLOSEC) 40 MG capsule Take 1 capsule by mouth Daily. 25  Yes Robin Cavazos DO   ondansetron ODT (ZOFRAN-ODT) 8 MG disintegrating tablet Place 1 tablet on the tongue Every 8 (Eight) Hours As Needed for Nausea or Vomiting. 25  Yes Robin Cavazos DO   simvastatin (ZOCOR) 40 MG tablet Take 1 tablet by mouth Every Night. 25  Yes Robin Cavazos DO   venlafaxine XR (EFFEXOR-XR) 150 MG 24 hr capsule Take 2 capsules by mouth Daily. 25  Yes Robin Cavazos DO        Social History:   Social History     Tobacco Use    Smoking status: Never     Passive exposure: Never    Smokeless tobacco: Never   Vaping Use    Vaping status: Never Used   Substance Use Topics    Alcohol use: Yes     Alcohol/week: 1.0 standard drink of alcohol     Types: 1 Glasses of wine per week     Comment: Maybe one drink a month    Drug use: Never         Review of Systems:  Review of Systems   Constitutional:  Negative for chills and fever.   HENT:  Negative for congestion, rhinorrhea and sore throat.    Eyes:  Negative for pain and visual disturbance.   Respiratory:  Negative for  inhalation. (Patient not taking: Reported on 5/5/2022)    HYDROcodone-acetaminophen (HYCET) 0.5-21.7 mg/mL oral solution Take 5 mL by mouth four (4) times daily as needed for Pain. Max Daily Amount: 10 mg. (Patient not taking: Reported on 5/5/2022)     No current facility-administered medications for this visit. Allergies: Allergies   Allergen Reactions    Peanut Shortness of Breath    Shellfish Derived Nausea and Vomiting    Tree Nut Shortness of Breath           Objective:       Visit Vitals  /72 (BP 1 Location: Right arm, BP Patient Position: Sitting)   Pulse 82   Temp 98 °F (36.7 °C) (Temporal)   Resp 18   Ht 5' 2.28\" (1.582 m)   Wt 102 lb 6.4 oz (46.4 kg)   SpO2 100%   BMI 18.56 kg/m²       Height: 23 %ile (Z= -0.75) based on CDC (Girls, 2-20 Years) Stature-for-age data based on Stature recorded on 5/5/2022. Weight: 9 %ile (Z= -1.34) based on CDC (Girls, 2-20 Years) weight-for-age data using vitals from 5/5/2022. BMI: Body mass index is 18.56 kg/m². Percentile: 16 %ile (Z= -1.00) based on CDC (Girls, 2-20 Years) BMI-for-age based on BMI available as of 5/5/2022. In general, Quinton Rojas is alert, well-appearing and in no acute distress. HEENT: Extraocular movements are intact. Dentition appropriate for age. Oropharynx is clear, mucous membranes moist. Neck is supple without lymphadenopathy. Thyroid is smooth and not enlarged. Abdomen is soft, nontender, nondistended, no hepatosplenomegaly. Skin is warm, without rash or macules. Neuro demonstrates 2+ patellar reflexes bilaterally. Extremities are within normal. Sexual development: stage post menarchal.  Family reports she had an implant placed at the last clinic visit. Laboratory data:  No results found for this or any previous visit. Assessment:       Jesus Bennett is a 18 y.o. 5 m.o. female presenting for evaluation for abnormal thyroid labs, elevated prolactin level.   Thyroid labs done by GYN on 3/11/2022 came back of normal "apnea, cough, chest tightness and shortness of breath.    Cardiovascular:  Negative for chest pain and palpitations.   Gastrointestinal:  Positive for abdominal pain and diarrhea. Negative for nausea and vomiting.   Genitourinary:  Negative for difficulty urinating and dysuria.   Musculoskeletal:  Negative for joint swelling and myalgias.   Skin:  Negative for color change.   Neurological:  Negative for seizures and headaches.   Psychiatric/Behavioral: Negative.     All other systems reviewed and are negative.       Physical Exam:  /62 Comment: Simultaneous filing. User may not have seen previous data.  Pulse 72   Temp 98.1 °F (36.7 °C) (Oral)   Resp 18   Ht 162.6 cm (64\")   Wt 80 kg (176 lb 5.9 oz)   SpO2 95%   BMI 30.27 kg/m²     Physical Exam  Vitals and nursing note reviewed.   Constitutional:       General: She is not in acute distress.     Appearance: Normal appearance. She is not toxic-appearing.   HENT:      Head: Normocephalic and atraumatic.      Jaw: There is normal jaw occlusion.      Mouth/Throat:      Mouth: Mucous membranes are dry.   Eyes:      General: Lids are normal.      Extraocular Movements: Extraocular movements intact.      Conjunctiva/sclera: Conjunctivae normal.      Pupils: Pupils are equal, round, and reactive to light.   Cardiovascular:      Rate and Rhythm: Normal rate and regular rhythm.      Pulses: Normal pulses.      Heart sounds: Normal heart sounds.   Pulmonary:      Effort: Pulmonary effort is normal. No respiratory distress.      Breath sounds: Normal breath sounds. No wheezing or rhonchi.   Abdominal:      General: Abdomen is flat.      Palpations: Abdomen is soft.      Tenderness: There is abdominal tenderness. There is no guarding or rebound.   Musculoskeletal:         General: Normal range of motion.      Cervical back: Normal range of motion and neck supple.      Right lower leg: No edema.      Left lower leg: No edema.   Skin:     General: Skin is warm and " TSH and mildly elevated free T4. Free T4 elevation will be suggestive of hyperthyroidism but we will expect suppressed TSH in hyperthyroidism. Furthermore we will expect symptom more of diarrhea than constipation with hyperthyroidism. Likely etiology of mild free T4 elevation is stress/illness. We will send repeat free T4 together with antibodies to evaluate further. We will give family a call to discuss the results as well as further management plan. Mild prolactin elevation: Mild prolactin elevation could be as a result of stress/illness. We usually get concerned the prolactin levels are greater than 50 with symptoms or >100 with or without symptoms. Prolactin level >100 can be associated with pituitary microadenoma. Mild elevation of prolactin can also be the result of stress. However we would like to repeat prolactin level by serial dilution to rule out a falsely lower level especially considering her hx of nipple discharge. We would also screen for macro prolactins which can be associated with falsely elevated prolactin levels. Macro prolactins large particles which can make normal prolactin levels appear elevated on assay. These particles are usually benign. Would call family with results and further management plan. We will likely see her back in clinic in 3 months or sooner if any concerns    Diagnostic considerations include: Abnormal thyroid labs, mildly elevated prolactin level         Plan:     Plan as above.   Reviewed charts and labs from the pediatrician  Diagnosis, etiology, pathophysiology, risk/ benefits of rx, proposed eval, and expected follow up discussed with family and all questions answered  Follow up in 3 months or sooner if any concerns    Orders Placed This Encounter    T4, FREE     Standing Status:   Future     Number of Occurrences:   1     Standing Expiration Date:   5/5/2023    T3 TOTAL     Standing Status:   Future     Number of Occurrences:   1     Standing Expiration Date:   5/5/2023    TSH 3RD GENERATION     Standing Status:   Future     Number of Occurrences:   1     Standing Expiration Date:   5/5/2023    THYROID STIMULATING IMMUNOGLOBULIN     Standing Status:   Future     Number of Occurrences:   1     Standing Expiration Date:   5/5/2023    THYROID PEROXIDASE (TPO) AB     Standing Status:   Future     Number of Occurrences:   1     Standing Expiration Date:   5/5/2023    THYROGLOBULIN AB     Standing Status:   Future     Number of Occurrences:   1     Standing Expiration Date:   5/5/2023    PROLACTIN     Please repeat by serial dilution and also check for macro prolactins     Standing Status:   Future     Number of Occurrences:   1     Standing Expiration Date:   5/5/2023    etonogestreL (Nexplanon) 68 mg impl     Sig: by SubDERmal route. Total time: 45minutes  Time spent counseling patient/family: 50%    Parts of these notes were done by Dragon dictation and may be subject to inadvertent grammatical errors due to issues of voice recognition. Inga Maradiaga MD            If you have questions, please do not hesitate to call me. I look forward to following your patient along with you.       Sincerely,    Inga Maradiaga MD dry.   Neurological:      Mental Status: She is alert and oriented to person, place, and time. Mental status is at baseline.   Psychiatric:         Mood and Affect: Mood normal.                    Medical Decision Making:      Comorbidities that affect care:    Diabetes    External Notes reviewed:    None      The following orders were placed and all results were independently analyzed by me:  Orders Placed This Encounter   Procedures    Enteric Bacterial Panel - Stool, Per Rectum    Enteric Parasite Panel - Stool, Per Rectum    Clostridioides difficile Toxin - Stool, Per Rectum    Clostridioides difficile Toxin, PCR - Stool, Per Rectum    CT Abdomen Pelvis With Contrast    XR Chest 1 View    [place] Draw    Comprehensive Metabolic Panel    Lipase    Urinalysis With Microscopic If Indicated (No Culture) - Urine, Clean Catch    Lactic Acid, Plasma    CBC Auto Differential    STAT Lactic Acid, Reflex    Blood Gas, Venous -    Blood Gas, Venous -    Acetone    Phosphorus    Magnesium    Osmolality, Serum    Hemoglobin A1c    High Sensitivity Troponin T    High Sensitivity Troponin T 1Hr    STAT Lactic Acid, Reflex    NPO Diet NPO Type: Strict NPO    Undress & Gown    Continuous Pulse Oximetry    Saline Lock & Maintain IV Access    Code Status and Medical Interventions: CPR (Attempt to Resuscitate); Full Support    Surgery (on-call MD unless specified)    Inpatient Hospitalist Consult    Oxygen Therapy- Nasal Cannula; Titrate 1-6 LPM Per SpO2; 90 - 95%    ECG 12 Lead Electrolyte Imbalance    Insert Peripheral IV    Insert Peripheral IV x2    Inpatient Admission    CBC & Differential    Green Top (Gel)    Lavender Top    Gold Top - SST    Light Blue Top       Medications Given in the Emergency Department:  Medications   sodium chloride 0.9 % flush 10 mL (has no administration in time range)   lactated ringers infusion (has no administration in time range)   lactated ringers bolus 500 mL (has no administration in time  range)   piperacillin-tazobactam (ZOSYN) IVPB 3.375 g IVPB in 100 mL NS (VTB) (has no administration in time range)   sodium chloride 0.9 % bolus 1,000 mL (0 mL Intravenous Stopped 6/26/25 1415)   lactated ringers bolus 1,000 mL (0 mL Intravenous Stopped 6/26/25 1558)   iopamidol (ISOVUE-370) 76 % injection 100 mL (100 mL Intravenous Given 6/26/25 1430)   piperacillin-tazobactam (ZOSYN) IVPB 4.5 g IVPB in 100 mL NS (VTB) (4.5 g Intravenous New Bag 6/26/25 1558)        ED Course:         Labs:    Lab Results (last 24 hours)       Procedure Component Value Units Date/Time    CBC & Differential [[id number]]  (Abnormal) Collected: 06/26/25 1325    Specimen: Blood Updated: 06/26/25 1332    Narrative:      The following orders were created for panel order CBC & Differential.  Procedure                               Abnormality         Status                     ---------                               -----------         ------                     CBC Auto Differential[[id number]]        Abnormal            Final result                 Please view results for these tests on the individual orders.    Lactic Acid, Plasma [[id number]]  (Abnormal) Collected: 06/26/25 1325    Specimen: Blood Updated: 06/26/25 1359     Lactate 9.3 mmol/L     CBC Auto Differential [[id number]]  (Abnormal) Collected: 06/26/25 1325    Specimen: Blood Updated: 06/26/25 1332     WBC 16.56 10*3/mm3      RBC 5.72 10*6/mm3      Hemoglobin 17.5 g/dL      Hematocrit 53.8 %      MCV 94.1 fL      MCH 30.6 pg      MCHC 32.5 g/dL      RDW 13.1 %      RDW-SD 45.1 fl      MPV 10.2 fL      Platelets 283 10*3/mm3      Neutrophil % 70.7 %      Lymphocyte % 24.7 %      Monocyte % 2.6 %      Eosinophil % 0.6 %      Basophil % 0.6 %      Immature Grans % 0.8 %      Neutrophils, Absolute 11.71 10*3/mm3      Lymphocytes, Absolute 4.09 10*3/mm3      Monocytes, Absolute 0.43 10*3/mm3      Eosinophils, Absolute 0.10 10*3/mm3      Basophils, Absolute 0.10 10*3/mm3       Immature Grans, Absolute 0.13 10*3/mm3      nRBC 0.0 /100 WBC     Acetone [[id number]]  (Normal) Collected: 06/26/25 1325    Specimen: Blood Updated: 06/26/25 1503     Acetone Negative    Osmolality, Serum [[id number]]  (Abnormal) Collected: 06/26/25 1325    Specimen: Blood Updated: 06/26/25 1532     Osmolality 306 mOsm/kg     Hemoglobin A1c [[id number]] Collected: 06/26/25 1325    Specimen: Blood Updated: 06/26/25 1529    Comprehensive Metabolic Panel [[id number]]  (Abnormal) Collected: 06/26/25 1350    Specimen: Blood Updated: 06/26/25 1415     Glucose 310 mg/dL      BUN 23.1 mg/dL      Creatinine 1.52 mg/dL      Sodium 137 mmol/L      Potassium 3.6 mmol/L      Comment: Slight hemolysis detected by analyzer. Result may be falsely elevated.        Chloride 101 mmol/L      CO2 11.9 mmol/L      Calcium 9.0 mg/dL      Total Protein 7.0 g/dL      Albumin 4.0 g/dL      ALT (SGPT) 14 U/L      AST (SGOT) 32 U/L      Comment: Slight hemolysis detected by analyzer. Result may be falsely elevated.        Alkaline Phosphatase 95 U/L      Total Bilirubin 1.4 mg/dL      Globulin 3.0 gm/dL      A/G Ratio 1.3 g/dL      BUN/Creatinine Ratio 15.2     Anion Gap 24.1 mmol/L      eGFR 37.2 mL/min/1.73     Narrative:      GFR Categories in Chronic Kidney Disease (CKD)              GFR Category          GFR (mL/min/1.73)    Interpretation  G1                    90 or greater        Normal or high (1)  G2                    60-89                Mild decrease (1)  G3a                   45-59                Mild to moderate decrease  G3b                   30-44                Moderate to severe decrease  G4                    15-29                Severe decrease  G5                    14 or less           Kidney failure    (1)In the absence of evidence of kidney disease, neither GFR category G1 or G2 fulfill the criteria for CKD.    eGFR calculation 2021 CKD-EPI creatinine equation, which does not include race as a factor    Lipase [[id number]]   (Abnormal) Collected: 06/26/25 1350    Specimen: Blood Updated: 06/26/25 1414     Lipase 65 U/L     Phosphorus [[id number]]  (Normal) Collected: 06/26/25 1350    Specimen: Blood Updated: 06/26/25 1542     Phosphorus 4.3 mg/dL     Magnesium [[id number]]  (Normal) Collected: 06/26/25 1350    Specimen: Blood Updated: 06/26/25 1542     Magnesium 2.3 mg/dL     High Sensitivity Troponin T [[id number]]  (Abnormal) Collected: 06/26/25 1350    Specimen: Blood Updated: 06/26/25 1542     HS Troponin T 29 ng/L     Narrative:      High Sensitive Troponin T Reference Range:  <14.0 ng/L- Negative Female for AMI  <22.0 ng/L- Negative Male for AMI  >=14 - Abnormal Female indicating possible myocardial injury.  >=22 - Abnormal Male indicating possible myocardial injury.   Clinicians would have to utilize clinical acumen, EKG, Troponin, and serial changes to determine if it is an Acute Myocardial Infarction or myocardial injury due to an underlying chronic condition.         Blood Gas, Venous - [[id number]]  (Abnormal) Collected: 06/26/25 1447    Specimen: Venous Blood Updated: 06/26/25 1450     pH, Venous 7.165 pH Units      pCO2, Venous 39.9 mm Hg      pO2, Venous 40.4 mm Hg      HCO3, Venous 14.4 mmol/L      Base Excess, Venous -13.6 mmol/L      Comment: Serial Number: [id number]:  [id number]        O2 Saturation, Venous 62.0 %      Hemoglobin, Blood Gas 15.1 g/dL      Barometric Pressure for Blood Gas 743.5000 mmHg      Modality Room Air     Notified Who zarina    STAT Lactic Acid, Reflex [[id number]]  (Abnormal) Collected: 06/26/25 1556    Specimen: Blood Updated: 06/26/25 1637     Lactate 5.8 mmol/L     Basic Metabolic Panel [[id number]]  (Abnormal) Collected: 06/26/25 1556    Specimen: Blood Updated: 06/26/25 1624     Glucose 167 mg/dL      BUN 25.1 mg/dL      Creatinine 1.37 mg/dL      Sodium 139 mmol/L      Potassium 4.1 mmol/L      Comment: Slight hemolysis detected by analyzer. Result may be falsely elevated.        Chloride 107  mmol/L      CO2 16.7 mmol/L      Calcium 8.3 mg/dL      BUN/Creatinine Ratio 18.3     Anion Gap 15.3 mmol/L      eGFR 42.1 mL/min/1.73     Narrative:      GFR Categories in Chronic Kidney Disease (CKD)              GFR Category          GFR (mL/min/1.73)    Interpretation  G1                    90 or greater        Normal or high (1)  G2                    60-89                Mild decrease (1)  G3a                   45-59                Mild to moderate decrease  G3b                   30-44                Moderate to severe decrease  G4                    15-29                Severe decrease  G5                    14 or less           Kidney failure    (1)In the absence of evidence of kidney disease, neither GFR category G1 or G2 fulfill the criteria for CKD.    eGFR calculation 2021 CKD-EPI creatinine equation, which does not include race as a factor    Magnesium [[id number]]  (Normal) Collected: 06/26/25 1556    Specimen: Blood Updated: 06/26/25 1625     Magnesium 1.8 mg/dL     Phosphorus [[id number]]  (Normal) Collected: 06/26/25 1556    Specimen: Blood Updated: 06/26/25 1629     Phosphorus 2.8 mg/dL     High Sensitivity Troponin T 1Hr [[id number]]  (Abnormal) Collected: 06/26/25 1556    Specimen: Blood Updated: 06/26/25 1637     HS Troponin T 71 ng/L      Troponin T Numeric Delta 42 ng/L      Troponin T % Delta 145    Narrative:      High Sensitive Troponin T Reference Range:  <14.0 ng/L- Negative Female for AMI  <22.0 ng/L- Negative Male for AMI  >=14 - Abnormal Female indicating possible myocardial injury.  >=22 - Abnormal Male indicating possible myocardial injury.   Clinicians would have to utilize clinical acumen, EKG, Troponin, and serial changes to determine if it is an Acute Myocardial Infarction or myocardial injury due to an underlying chronic condition.                  Imaging:    XR Chest 1 View  Result Date: 6/26/2025  XR CHEST 1 VW Date of Exam: 6/26/2025 4:04 PM EDT Indication: Assess for Fluid  Overload Assess for Fluid Overload Comparison: 5/1/2025 Findings: Cardiomediastinal silhouette is unremarkable.  No airspace disease, pneumothorax, nor pleural effusion. No acute osseous abnormality identified.     Impression: No evidence of fluid overload. Electronically Signed: Margarito Crockett MD  6/26/2025 4:09 PM EDT  Workstation ID: [id number]    CT Abdomen Pelvis With Contrast  Result Date: 6/26/2025  CT ABDOMEN PELVIS W CONTRAST Date of Exam: 6/26/2025 2:13 PM EDT Indication: ABD PAIN. Comparison: None available. Technique: Axial CT images were obtained of the abdomen and pelvis after the uneventful intravenous administration of iodinated contrast. Reconstructed coronal and sagittal images were also obtained. Automated exposure control and iterative construction methods were used. Findings: Liver: The liver is unremarkable in morphology. No focal liver lesion is seen. No biliary dilation is seen. Gallbladder: Unremarkable. Pancreas: Unremarkable. Spleen: Unremarkable. Adrenal glands: Unremarkable. Genitourinary tract: Kidneys are unremarkable. No hydronephrosis is seen. The visualized portions of the ureters are unremarkable. The urinary bladder is decompressed which limits its evaluation. Pelvic organs demonstrate no acute abnormality. 2.6 cm left uterine mass likely represents a fibroid. Gastrointestinal tract: Suggestion of mild colonic wall thickening and mucosal hyperenhancement with moderate colonic fluid. Infectious or inflammatory colitis may have this appearance. Please correlate clinically. Remaining hollow viscera appear unremarkable. No findings to suggest bowel obstruction. Appendix: No findings to suggest acute appendicitis. Other findings: No free air or free fluid is identified. No pathologically enlarged lymph nodes are seen. Vascular calcifications are present. The IVC is unremarkable. Portosystemic venous collateral noted within the left abdomen draining to the left renal vein. Bones and soft  tissues: No acute or suspicious osseous or soft tissue lesion is identified. Lung bases: The visualized lung bases are clear.     Impression: 1.Suggestion of mild colonic wall thickening and mucosal hyperenhancement with moderate colonic fluid. Infectious or inflammatory colitis may have this appearance. 2.Portosystemic venous collateral noted within the left abdomen draining to the left renal vein. 3.Probable 2.6 cm uterine fibroid. 4.Additional findings as detailed above. Electronically Signed: Pietro Galvan MD  6/26/2025 2:41 PM EDT  Workstation ID: [id number]        Differential Diagnosis and Discussion:    Abdominal Pain: Based on the patient's signs and symptoms, I considered abdominal aortic aneurysm, small bowel obstruction, pancreatitis, acute cholecystitis, acute appendecitis, peptic ulcer disease, gastritis, colitis, endocrine disorders, irritable bowel syndrome and other differential diagnosis an etiology of the patient's abdominal pain.  Diarrhea: Differential diagnosis includes but is not limited to malabsorption syndrome, bacterial infection, carcinoid syndrome, pancreatic hypersecretion, viral infection, celiac sprue, Crohn's disease, ulcerative colitis, ischemic colitis, colitis, hypermotility, and irritable bowel syndrome.  Metabolic: Differential diagnosis includes but is not limited to hypertension, hyperglycemia, hyperkalemia, hypocalcemia, metabolic acidosis, hypokalemia, hypoglycemia, malnutrition, hypothyroidism, hyperthyroidism, and adrenal insufficiency.     PROCEDURES:    Labs were collected in the emergency department and all labs were reviewed and interpreted by me.  CT scan was performed in the emergency department and the CT scan radiology impression was interpreted by me.    ECG 12 Lead Electrolyte Imbalance    (Results Pending)       Procedures    MDM  Number of Diagnoses or Management Options  Colitis  Diabetic ketoacidosis without coma associated with type 2 diabetes  mellitus  Uncontrolled type 2 diabetes mellitus with hyperglycemia  Diagnosis management comments: In summary this is a 70-year-old female who presents for evaluation of abdominal pain and diarrhea.  On arrival she is having numerous episodes of very watery diarrhea.  CBC independently reviewed and interpreted by me and shows no critical abnormalities except leukocytosis.  CMP independently reviewed interpreted by me reveals critical findings of hyperglycemia, decreased serum bicarb and elevated anion gap consistent with DKA.  Venous blood gas independently interpreted by me shows acidosis with pH of 7.1.  Initial lactic acid level critically elevated at 9.3 based on my independent interpretation.  CT scan abdomen pelvis reviewed by me shows concern for possible colitis, no definite indication of ischemic bowel.  Discussed all of these findings with general surgery who has seen and evaluate the patient midline and agrees no significant signs of ischemic bowel.  Patient was started on IV fluid resuscitation and insulin drip.  Patient case has been discussed with the hospitalist team who will admit to the hospital for further evaluation and continuation of treatment.             Patient was placed on the cardiac monitor after being given insulin drip.  They were monitored for ventricular ectopy, arrhythmia, tachycardia, hypoxia, and changes in blood pressure.  Patient was rechecked several times throughout their stay for mental status decline and for reassessment of worsening changes in vital signs.     Total Critical Care time of 35 minutes. Total critical care time documented does not include time spent on separately billed procedures for services of nurses or physician assistants. I personally saw and examined the patient. I have reviewed all diagnostic interpretations and treatment plans as written. I was present for the key portions of any procedures performed and the inclusive time noted in any critical care  statement. Critical care time includes patient management by me, time spent at the patients bedside,  time to review lab and imaging results, discussing patient care, documentation in the medical record, and time spent with family or caregiver.          Patient Care Considerations:    SEPSIS IS NOT PRESENT IN THE EMERGENCY DEPARTMENT: Patient meets SIRS criteria in the emergency department however the patient does not have a known source of bacterial infection to confirm the diagnosis of sepsis.        Consultants/Shared Management Plan:    Hospitalist: I have discussed the case with Dr. Simmons who agrees to accept the patient for admission.  Consultant: I have discussed the case with Dr. Mazariegos who agrees to consult on the patient.    Social Determinants of Health:    Patient is independent, reliable, and has access to care.       Disposition and Care Coordination:    Admit:   Through independent evaluation of the patient's history, physical, and imperical data, the patient meets criteria for inpatient admission to the hospital.        Final diagnoses:   Uncontrolled type 2 diabetes mellitus with hyperglycemia   Diabetic ketoacidosis without coma associated with type 2 diabetes mellitus   Colitis        ED Disposition       ED Disposition   Decision to Admit    Condition   --    Comment   Level of Care: Critical Care [6]   Diagnosis: Colitis [085385]   Admitting Physician: LORNE SIMMONS [[id number]]   Attending Physician: LORNE SIMMONS [[id number]]   Certification: I Certify That Inpatient Hospital Services Are Medically Necessary For Greater Than 2 Midnights                 This medical record created using voice recognition software.             Howard Forbes MD  06/26/25 2692

## 2025-06-27 LAB
027 TOXIN: NORMAL
ANION GAP SERPL CALCULATED.3IONS-SCNC: 12.4 MMOL/L (ref 5–15)
BASOPHILS # BLD AUTO: 0.12 10*3/MM3 (ref 0–0.2)
BASOPHILS NFR BLD AUTO: 0.5 % (ref 0–1.5)
BILIRUB UR QL STRIP: NEGATIVE
BUN SERPL-MCNC: 21.5 MG/DL (ref 8–23)
BUN/CREAT SERPL: 19.9 (ref 7–25)
C DIFF TOX GENS STL QL NAA+PROBE: NEGATIVE
CALCIUM SPEC-SCNC: 8 MG/DL (ref 8.6–10.5)
CHLORIDE SERPL-SCNC: 107 MMOL/L (ref 98–107)
CLARITY UR: CLEAR
CO2 SERPL-SCNC: 16.6 MMOL/L (ref 22–29)
COLOR UR: YELLOW
CREAT SERPL-MCNC: 1.08 MG/DL (ref 0.57–1)
DEPRECATED RDW RBC AUTO: 41.7 FL (ref 37–54)
EGFRCR SERPLBLD CKD-EPI 2021: 56.1 ML/MIN/1.73
EOSINOPHIL # BLD AUTO: 0.64 10*3/MM3 (ref 0–0.4)
EOSINOPHIL NFR BLD AUTO: 2.5 % (ref 0.3–6.2)
ERYTHROCYTE [DISTWIDTH] IN BLOOD BY AUTOMATED COUNT: 12.9 % (ref 12.3–15.4)
GLUCOSE BLDC GLUCOMTR-MCNC: 76 MG/DL (ref 70–99)
GLUCOSE BLDC GLUCOMTR-MCNC: 84 MG/DL (ref 70–99)
GLUCOSE BLDC GLUCOMTR-MCNC: 97 MG/DL (ref 70–99)
GLUCOSE BLDC GLUCOMTR-MCNC: 97 MG/DL (ref 70–99)
GLUCOSE SERPL-MCNC: 151 MG/DL (ref 65–99)
GLUCOSE UR STRIP-MCNC: NEGATIVE MG/DL
HCT VFR BLD AUTO: 40 % (ref 34–46.6)
HGB BLD-MCNC: 14.1 G/DL (ref 12–15.9)
HGB UR QL STRIP.AUTO: NEGATIVE
IMM GRANULOCYTES # BLD AUTO: 0.51 10*3/MM3 (ref 0–0.05)
IMM GRANULOCYTES NFR BLD AUTO: 2 % (ref 0–0.5)
KETONES UR QL STRIP: NEGATIVE
LEUKOCYTE ESTERASE UR QL STRIP.AUTO: NEGATIVE
LYMPHOCYTES # BLD AUTO: 0.94 10*3/MM3 (ref 0.7–3.1)
LYMPHOCYTES NFR BLD AUTO: 3.7 % (ref 19.6–45.3)
MAGNESIUM SERPL-MCNC: 1.7 MG/DL (ref 1.6–2.4)
MCH RBC QN AUTO: 31.1 PG (ref 26.6–33)
MCHC RBC AUTO-ENTMCNC: 35.3 G/DL (ref 31.5–35.7)
MCV RBC AUTO: 88.3 FL (ref 79–97)
MONOCYTES # BLD AUTO: 0.88 10*3/MM3 (ref 0.1–0.9)
MONOCYTES NFR BLD AUTO: 3.5 % (ref 5–12)
NEUTROPHILS NFR BLD AUTO: 22.25 10*3/MM3 (ref 1.7–7)
NEUTROPHILS NFR BLD AUTO: 87.8 % (ref 42.7–76)
NITRITE UR QL STRIP: NEGATIVE
NRBC BLD AUTO-RTO: 0 /100 WBC (ref 0–0.2)
PH UR STRIP.AUTO: 6 [PH] (ref 5–8)
PHOSPHATE SERPL-MCNC: 3.3 MG/DL (ref 2.5–4.5)
PLATELET # BLD AUTO: 178 10*3/MM3 (ref 140–450)
PMV BLD AUTO: 9.9 FL (ref 6–12)
POTASSIUM SERPL-SCNC: 4.2 MMOL/L (ref 3.5–5.2)
PROT UR QL STRIP: NEGATIVE
RBC # BLD AUTO: 4.53 10*6/MM3 (ref 3.77–5.28)
RBC MORPH BLD: NORMAL
SMALL PLATELETS BLD QL SMEAR: ADEQUATE
SODIUM SERPL-SCNC: 136 MMOL/L (ref 136–145)
SP GR UR STRIP: 1.01 (ref 1–1.03)
UROBILINOGEN UR QL STRIP: NORMAL
WBC MORPH BLD: NORMAL
WBC NRBC COR # BLD AUTO: 25.34 10*3/MM3 (ref 3.4–10.8)

## 2025-06-27 PROCEDURE — 25010000002 PIPERACILLIN SOD-TAZOBACTAM PER 1 G: Performed by: HOSPITALIST

## 2025-06-27 PROCEDURE — 84100 ASSAY OF PHOSPHORUS: CPT | Performed by: INTERNAL MEDICINE

## 2025-06-27 PROCEDURE — 97161 PT EVAL LOW COMPLEX 20 MIN: CPT

## 2025-06-27 PROCEDURE — 80048 BASIC METABOLIC PNL TOTAL CA: CPT | Performed by: HOSPITALIST

## 2025-06-27 PROCEDURE — 82948 REAGENT STRIP/BLOOD GLUCOSE: CPT

## 2025-06-27 PROCEDURE — 87493 C DIFF AMPLIFIED PROBE: CPT | Performed by: HOSPITALIST

## 2025-06-27 PROCEDURE — 81003 URINALYSIS AUTO W/O SCOPE: CPT | Performed by: EMERGENCY MEDICINE

## 2025-06-27 PROCEDURE — 82948 REAGENT STRIP/BLOOD GLUCOSE: CPT | Performed by: HOSPITALIST

## 2025-06-27 PROCEDURE — 83735 ASSAY OF MAGNESIUM: CPT | Performed by: INTERNAL MEDICINE

## 2025-06-27 PROCEDURE — 85025 COMPLETE CBC W/AUTO DIFF WBC: CPT | Performed by: HOSPITALIST

## 2025-06-27 PROCEDURE — 85007 BL SMEAR W/DIFF WBC COUNT: CPT | Performed by: HOSPITALIST

## 2025-06-27 PROCEDURE — 99232 SBSQ HOSP IP/OBS MODERATE 35: CPT | Performed by: INTERNAL MEDICINE

## 2025-06-27 PROCEDURE — 87506 IADNA-DNA/RNA PROBE TQ 6-11: CPT | Performed by: HOSPITALIST

## 2025-06-27 RX ORDER — ATORVASTATIN CALCIUM 20 MG/1
20 TABLET, FILM COATED ORAL NIGHTLY
Status: DISCONTINUED | OUTPATIENT
Start: 2025-06-27 | End: 2025-06-28 | Stop reason: HOSPADM

## 2025-06-27 RX ORDER — VENLAFAXINE HYDROCHLORIDE 75 MG/1
150 CAPSULE, EXTENDED RELEASE ORAL NIGHTLY
Status: DISCONTINUED | OUTPATIENT
Start: 2025-06-27 | End: 2025-06-28 | Stop reason: HOSPADM

## 2025-06-27 RX ADMIN — ATORVASTATIN CALCIUM 20 MG: 20 TABLET, FILM COATED ORAL at 22:21

## 2025-06-27 RX ADMIN — Medication 10 ML: at 09:16

## 2025-06-27 RX ADMIN — Medication 10 ML: at 20:59

## 2025-06-27 RX ADMIN — PRAZOSIN HYDROCHLORIDE 2 MG: 1 CAPSULE ORAL at 20:58

## 2025-06-27 RX ADMIN — PIPERACILLIN AND TAZOBACTAM 3.38 G: 3; .375 INJECTION, POWDER, FOR SOLUTION INTRAVENOUS at 07:40

## 2025-06-27 RX ADMIN — PIPERACILLIN AND TAZOBACTAM 3.38 G: 3; .375 INJECTION, POWDER, FOR SOLUTION INTRAVENOUS at 16:01

## 2025-06-27 RX ADMIN — FAMOTIDINE 20 MG: 20 TABLET, FILM COATED ORAL at 07:37

## 2025-06-27 RX ADMIN — VENLAFAXINE HYDROCHLORIDE 150 MG: 75 CAPSULE, EXTENDED RELEASE ORAL at 22:20

## 2025-06-27 RX ADMIN — FAMOTIDINE 20 MG: 20 TABLET, FILM COATED ORAL at 16:55

## 2025-06-27 RX ADMIN — PIPERACILLIN AND TAZOBACTAM 3.38 G: 3; .375 INJECTION, POWDER, FOR SOLUTION INTRAVENOUS at 00:03

## 2025-06-27 NOTE — PLAN OF CARE
Goal Outcome Evaluation:  Plan of Care Reviewed With: (P) patient           Outcome Evaluation: (P) Pt did not present with significant impairments requiring skilled PT. Pt reported recent surgery on L thumb. PT recommends discharge to home with outpatient services for previously mentioned surgical procedure.    Anticipated Discharge Disposition (PT): (P) home with outpatient therapy services

## 2025-06-27 NOTE — PLAN OF CARE
Goal Outcome Evaluation:         See emar for medication admin and titrations. See flowsheets for assessments. VSS

## 2025-06-27 NOTE — PROGRESS NOTES
[facility]   Hospitalist Progress Note  Date: 2025  Patient Name: Suyapa Barrera  : 1955  MRN: [id number]  Date of admission: 2025  Room/Bed: [facility]      Subjective   Subjective     Chief Complaint: Nausea vomiting abdominal pain    Summary:Suyapa Barrera is a 70 y.o. female  with a past medical history of depression, hypertension, hyperlipidemia who presents to the emergency room with diffuse abdominal pain and diarrhea.  Prior to today patient was asymptomatic.  She has had multiple bouts of diarrhea was brought to the ED.  Patient is taking Mounjaro for 1.5 years.  She had a similar episode 1 year ago. Patient had an ultrasound guided biopsy of her thyroid on 2025.On arrival to the ED, patient temperature 98.1, pulse of 87, respiratory rate of 18, blood pressure 96/57, and she was saturating 96% on room air.Chest x-ray is clear.CT abdomen and pelvis with contrast: Suggestion of mild colonic wall thickening and mucosal hyperenhancement with mild colonic fluid.  Infectious or inflammatory colitis may have this appearance.  Portosystemic venous collateral noted within the left abdomen draining to the left renal vein.  Probable 2.6 cm uterine fibroid. Initial troponin was 29, second troponin was 71.  Delta T of 42.  Patient denies any chest pain.  Patient sodium is 139, CO2 is 16.7, anion gap is 15.3, creatinine is 1.37, glucose is 167.  Calcium is 8.3.  Initial lactic was 9.3, follow-up lactic was 5.8.  White blood cell count is 16.56, hemoglobin is 17.5.     Interval Followup: 2025  Patient states she is doing much better today  Patient denies any nausea vomiting or abdominal pain she has not even had any diarrhea  Vital signs remained stable      Review of Systems    All systems reviewed and negative except for what is outlined above.      Objective   Objective     Vitals:   Temp:  [98.1 °F (36.7 °C)-98.9 °F (37.2 °C)] 98.9 °F (37.2 °C)  Heart Rate:  [] 98  Resp:  [16-21]  19  BP: ()/(44-95) 104/62    Physical Exam   General: Awake, alert, NAD  HENT: NCAT, MMM  Eyes: pupils equal, no scleral icterus  Cardiovascular: RRR, no murmurs   Pulmonary: CTA bilaterally; no wheezes; no conversational dyspnea  Gastrointestinal: S/ND/NT, +BS  Musculoskeletal: No gross deformities  Skin: No jaundice, no rash on exposed skin appreciated  Neuro: CN II through XII grossly intact; speech clear; no tremor  Psych: Mood and affect appropriate  : No Hu catheter; no suprapubic tenderness    Result Review    Result Review:  I have personally reviewed these results:  [x]  Laboratory      Lab 06/27/25  0200 06/26/25  2232 06/26/25  1947 06/26/25  1556 06/26/25  1325   WBC 25.34*  --   --   --  16.56*   HEMOGLOBIN 14.1  --   --   --  17.5*   HEMATOCRIT 40.0  --   --   --  53.8*   PLATELETS 178  --   --   --  283   NEUTROS ABS 22.25*  --   --   --  11.71*   IMMATURE GRANS (ABS) 0.51*  --   --   --  0.13*   LYMPHS ABS 0.94  --   --   --  4.09*   MONOS ABS 0.88  --   --   --  0.43   EOS ABS 0.64*  --   --   --  0.10   MCV 88.3  --   --   --  94.1   LACTATE  --  3.1* 3.4* 5.8* 9.3*         Lab 06/27/25  0241 06/26/25  1556 06/26/25  1350 06/26/25  1325   SODIUM 136 139 137  --    POTASSIUM 4.2 4.1 3.6  --    CHLORIDE 107 107 101  --    CO2 16.6* 16.7* 11.9*  --    ANION GAP 12.4 15.3* 24.1*  --    BUN 21.5 25.1* 23.1*  --    CREATININE 1.08* 1.37* 1.52*  --    EGFR 56.1* 42.1* 37.2*  --    GLUCOSE 151* 167* 310*  --    CALCIUM 8.0* 8.3* 9.0  --    MAGNESIUM 1.7 1.8 2.3  --    PHOSPHORUS 3.3 2.8 4.3  --    HEMOGLOBIN A1C  --   --   --  5.60         Lab 06/26/25  1350   TOTAL PROTEIN 7.0   ALBUMIN 4.0   GLOBULIN 3.0   ALT (SGPT) 14   AST (SGOT) 32   BILIRUBIN 1.4*   ALK PHOS 95   LIPASE 65*         Lab 06/26/25  1556 06/26/25  1350   HSTROP T 71* 29*                 Brief Urine Lab Results  (Last result in the past 365 days)        Color   Clarity   Blood   Leuk Est   Nitrite   Protein   CREAT   Urine HCG         06/05/25 0748             127.0               [x]  Microbiology   Microbiology Results (last 10 days)       ** No results found for the last 240 hours. **          [x]  Radiology  XR Chest 1 View  Result Date: 6/26/2025  Impression: No evidence of fluid overload. Electronically Signed: Margarito Crockett MD  6/26/2025 4:09 PM EDT  Workstation ID: [id number]    CT Abdomen Pelvis With Contrast  Result Date: 6/26/2025  Impression: 1.Suggestion of mild colonic wall thickening and mucosal hyperenhancement with moderate colonic fluid. Infectious or inflammatory colitis may have this appearance. 2.Portosystemic venous collateral noted within the left abdomen draining to the left renal vein. 3.Probable 2.6 cm uterine fibroid. 4.Additional findings as detailed above. Electronically Signed: Pietro Galvan MD  6/26/2025 2:41 PM EDT  Workstation ID: [id number]    US Guided Thyroid Biopsy  Result Date: 6/20/2025  Impression: 1.Successful ultrasound-guided biopsy of dominant left thyroid nodule without evidence of complication. Electronically Signed: Isaiah Serrano MD  6/20/2025 11:05 AM EDT  Workstation ID: [id number]    []  EKG/Telemetry   []  Cardiology/Vascular   []  Pathology  []  Old records  []  Other:    Assessment & Plan   Assessment / Plan     Assessment:  Acute colitis  Metabolic acidosis  GERD  Diabetes mellitus type 2  Hyperlipidemia  Hypertension  Acute kidney injury    Plan:  Patient remains admitted to the medicine service  Patient symptoms have resolved.  Symptoms thought secondary to GLP-1-toxicity  Continue hydration  Stool studies pending however patient has had no further diarrhea  Continue patient on Protonix for GERD  Continue patient on sliding scale insulin for diabetes  Continue patient on Zocor for hyperlipidemia  Discussed plan with nursing with patient  Repeat CBC and BMP in the morning to monitor electrolytes and hemoglobin       Discussed with RN.    VTE Prophylaxis:  Pharmacologic VTE prophylaxis  orders are present.        CODE STATUS:   Code Status (Patient has no pulse and is not breathing): CPR (Attempt to Resuscitate)  Medical Interventions (Patient has pulse or is breathing): Full Support  Level Of Support Discussed With: Patient      Electronically signed by Juan Valenzuela DO, 6/27/2025, 09:42 EDT.

## 2025-06-27 NOTE — PROGRESS NOTES
"Intensive Care Admission Note     DKA (diabetic ketoacidosis)    History of Present Illness     70-year-old female with past medical history of colitis, hyperlipidemia, hypertension, type 2 diabetes, constipation, and obstructive sleep apnea presented to the ED with abdominal pain, nausea and vomiting.  She reports she was at physical therapy and began having severe abdominal pain as well as nausea vomiting and diarrhea.  She states she passed out several times in the bathroom.  Of note patient is taking Mounjaro.  Patient labs were remarkable for elevated anion gap metabolic acidosis, LEONELA with creatinine of 1.37, lactic acid of 9.3 and elevated WBC of 16.5 and hemoconcentration with hemoglobin of 17.5.  CT scan of the abdomen pelvis showed mild colonic wall thickening and mucosal hyperenhancement.  General surgery was consulted in the ED given significantly elevated lactate and colitis like picture.    Sub:  Diarrhea is now resolved.  No nausea or emesis.  Tolerating diet well.  Lactic acid is clearing.  Denies any fevers or chills.      Physical Exam and Clinical Information   /62 (BP Location: Left arm, Patient Position: Sitting)   Pulse 98   Temp 98.9 °F (37.2 °C) (Oral)   Resp 19   Ht 162.6 cm (64\")   Wt 78.4 kg (172 lb 13.5 oz)   SpO2 95%   BMI 29.67 kg/m²     Physical exam  General : Aox3. In no acute distress. Well developed  Neuro: CN 2-12 grossly intact, moves bilateral upper and lower extremities.  HEENT : AT,NC,PERRLA,No pallor, No Icterus,No thyromegaly. No JVD.  CVS : RRR, No M/R/G. B/l UE and LE palpable pulses +  Resp/Chest: B/l ant VBS+. No wheeze/rales  Abd: Soft, Non distended, No tenderness, No organomegaly. Bowel sounds +  EXT: NO edema.   SKIN : NO palpable skin lesions/rashes noted.    Results from last 7 days   Lab Units 06/27/25  0200 06/26/25  1325   WBC 10*3/mm3 25.34* 16.56*   HEMOGLOBIN g/dL 14.1 17.5*   PLATELETS 10*3/mm3 178 283     Results from last 7 days   Lab Units " 06/27/25  0241 06/26/25  1556 06/26/25  1350   SODIUM mmol/L 136 139 137   POTASSIUM mmol/L 4.2 4.1 3.6   CO2 mmol/L 16.6* 16.7* 11.9*   BUN mg/dL 21.5 25.1* 23.1*   CREATININE mg/dL 1.08* 1.37* 1.52*   MAGNESIUM mg/dL 1.7 1.8 2.3   PHOSPHORUS mg/dL 3.3 2.8 4.3   GLUCOSE mg/dL 151* 167* 310*     Estimated Creatinine Clearance: 50.5 mL/min (A) (by C-G formula based on SCr of 1.08 mg/dL (H)).  Results from last 7 days   Lab Units 06/26/25  1325   HEMOGLOBIN A1C % 5.60         Lab Results   Component Value Date    LACTATE 3.1 (C) 06/26/2025        Images: CT abdomen and pelvis personally reviewed.  Mild colitis noted.  No perforation or any signs of fat stranding noted    I reviewed the patient's results and images.     Impression   Acute colitis 2/2 GLP 1 toxicity  Elevated anion gap metabolic acidosis 2/2 lactic acidosis and LEONELA  Acute kidney injury  Hypovolemia and dehydration 2/2 emesis and diarrhea  History of GERD  History of type 2 diabetes mellitus  Essential hypertension      Plan/Recommendations     -Sudden onset of symptoms with multiple bouts of diarrhea and emesis and mild colitis picture on the scan with labs suggestive of hemoconcentration and elevated lactic acid is classic and consistent with very well-known side effect/toxicity of GLP-1 analog medications.  Treatment is conservative with holding the medication and IV hydration.  Lactate is also improving.  Can finish the current bag of LR and stop.  Encouraged p.o. diet and plenty of fluids.  Discussed with patient about discussing with her PCP about alternatives for weight loss.    -Currently on Zosyn for for colitis.  Does not look like any infectious etiology.  Elevated WBCs likely reactive.  Can stop antibiotics.    -Stable to transfer out of ICU.        Yaakov Perez MD   Critical Care Medicine  06/27/25 09:41 EDT

## 2025-06-27 NOTE — PROGRESS NOTES
"POST OP PROGRESS NOTE     Patient Name:  Suyapa Barrera  YOB: 1955  [id number]   LOS: 1 day   * No surgery found *            Subjective     Interval History:   VSS, tolerating clears, no abdominal pain, reports still having some diarrhea    Review of Systems:    A complete review of systems was performed and all are negative except what is documented in the HPI.       Objective     Constitutional:  well nourished, no acute distress, appears stated age /60 (BP Location: Left arm, Patient Position: Lying)   Pulse 82   Temp 97.8 °F (36.6 °C) (Oral)   Resp 18   Ht 162.6 cm (64\")   Wt 78.4 kg (172 lb 13.5 oz)   SpO2 95%   BMI 29.67 kg/m²    Eyes:  anicteric sclerae, moist conjunctivae, no lid lag, PERRLA  ENMT:  oropharynx clear, moist mucous membranes  Neck:   full ROM, trachea midline  Cardiovascular:  heart rate 82, no pedal edema  Respiratory:  respirations even and unlabored  GI:  Abdomen soft, nontender, nondistended     Skin:  warm and dry, normal turgor, no rashes  Psychiatric:  alert and oriented x 4,  cooperative          Results Review:       I reviewed the patient's new clinical results including  CBC, BMP.     WBC   Date Value Ref Range Status   06/27/2025 25.34 (H) 3.40 - 10.80 10*3/mm3 Final     RBC   Date Value Ref Range Status   06/27/2025 4.53 3.77 - 5.28 10*6/mm3 Final     Hemoglobin   Date Value Ref Range Status   06/27/2025 14.1 12.0 - 15.9 g/dL Final     Hematocrit   Date Value Ref Range Status   06/27/2025 40.0 34.0 - 46.6 % Final     MCV   Date Value Ref Range Status   06/27/2025 88.3 79.0 - 97.0 fL Final     MCH   Date Value Ref Range Status   06/27/2025 31.1 26.6 - 33.0 pg Final     MCHC   Date Value Ref Range Status   06/27/2025 35.3 31.5 - 35.7 g/dL Final     RDW   Date Value Ref Range Status   06/27/2025 12.9 12.3 - 15.4 % Final     RDW-SD   Date Value Ref Range Status   06/27/2025 41.7 37.0 - 54.0 fl Final     MPV   Date Value Ref Range Status   06/27/2025 9.9 " 6.0 - 12.0 fL Final     Platelets   Date Value Ref Range Status   06/27/2025 178 140 - 450 10*3/mm3 Final     Neutrophil %   Date Value Ref Range Status   06/27/2025 87.8 (H) 42.7 - 76.0 % Final     Lymphocyte %   Date Value Ref Range Status   06/27/2025 3.7 (L) 19.6 - 45.3 % Final     Monocyte %   Date Value Ref Range Status   06/27/2025 3.5 (L) 5.0 - 12.0 % Final     Eosinophil %   Date Value Ref Range Status   06/27/2025 2.5 0.3 - 6.2 % Final     Basophil %   Date Value Ref Range Status   06/27/2025 0.5 0.0 - 1.5 % Final     Immature Grans %   Date Value Ref Range Status   06/27/2025 2.0 (H) 0.0 - 0.5 % Final     Neutrophils, Absolute   Date Value Ref Range Status   06/27/2025 22.25 (H) 1.70 - 7.00 10*3/mm3 Final     Lymphocytes, Absolute   Date Value Ref Range Status   06/27/2025 0.94 0.70 - 3.10 10*3/mm3 Final     Monocytes, Absolute   Date Value Ref Range Status   06/27/2025 0.88 0.10 - 0.90 10*3/mm3 Final     Eosinophils, Absolute   Date Value Ref Range Status   06/27/2025 0.64 (H) 0.00 - 0.40 10*3/mm3 Final     Basophils, Absolute   Date Value Ref Range Status   06/27/2025 0.12 0.00 - 0.20 10*3/mm3 Final     Immature Grans, Absolute   Date Value Ref Range Status   06/27/2025 0.51 (H) 0.00 - 0.05 10*3/mm3 Final     nRBC   Date Value Ref Range Status   06/27/2025 0.0 0.0 - 0.2 /100 WBC Final         Basic Metabolic Panel    Sodium Sodium   Date Value Ref Range Status   06/27/2025 136 136 - 145 mmol/L Final   06/26/2025 139 136 - 145 mmol/L Final   06/26/2025 137 136 - 145 mmol/L Final      Potassium Potassium   Date Value Ref Range Status   06/27/2025 4.2 3.5 - 5.2 mmol/L Final   06/26/2025 4.1 3.5 - 5.2 mmol/L Final     Comment:     Slight hemolysis detected by analyzer. Result may be falsely elevated.   06/26/2025 3.6 3.5 - 5.2 mmol/L Final     Comment:     Slight hemolysis detected by analyzer. Result may be falsely elevated.      Chloride Chloride   Date Value Ref Range Status   06/27/2025 107 98 - 107  "mmol/L Final   06/26/2025 107 98 - 107 mmol/L Final   06/26/2025 101 98 - 107 mmol/L Final      Bicarbonate No results found for: \"PLASMABICARB\"   BUN BUN   Date Value Ref Range Status   06/27/2025 21.5 8.0 - 23.0 mg/dL Final   06/26/2025 25.1 (H) 8.0 - 23.0 mg/dL Final   06/26/2025 23.1 (H) 8.0 - 23.0 mg/dL Final      Creatinine Creatinine   Date Value Ref Range Status   06/27/2025 1.08 (H) 0.57 - 1.00 mg/dL Final   06/26/2025 1.37 (H) 0.57 - 1.00 mg/dL Final   06/26/2025 1.52 (H) 0.57 - 1.00 mg/dL Final      Calcium Calcium   Date Value Ref Range Status   06/27/2025 8.0 (L) 8.6 - 10.5 mg/dL Final   06/26/2025 8.3 (L) 8.6 - 10.5 mg/dL Final   06/26/2025 9.0 8.6 - 10.5 mg/dL Final      Glucose      No components found for: \"GLUCOSE.*\"       Lab Results   Component Value Date    GLUCOSE 151 (H) 06/27/2025    BUN 21.5 06/27/2025    CREATININE 1.08 (H) 06/27/2025     06/27/2025    K 4.2 06/27/2025     06/27/2025    CALCIUM 8.0 (L) 06/27/2025    PROTEINTOT 7.0 06/26/2025    ALBUMIN 4.0 06/26/2025    ALT 14 06/26/2025    AST 32 06/26/2025    ALKPHOS 95 06/26/2025    BILITOT 1.4 (H) 06/26/2025    GLOB 3.0 06/26/2025    AGRATIO 1.3 06/26/2025    BCR 19.9 06/27/2025    ANIONGAP 12.4 06/27/2025    EGFR 56.1 (L) 06/27/2025       IMAGING:  Imaging Results (Last 72 Hours)       Procedure Component Value Units Date/Time    XR Chest 1 View [[id number]] Collected: 06/26/25 1609     Updated: 06/26/25 1611    Narrative:      XR CHEST 1 VW    Date of Exam: 6/26/2025 4:04 PM EDT    Indication: Assess for Fluid Overload  Assess for Fluid Overload    Comparison: 5/1/2025    Findings:  Cardiomediastinal silhouette is unremarkable.  No airspace disease, pneumothorax, nor pleural effusion. No acute osseous abnormality identified.      Impression:      Impression:  No evidence of fluid overload.        Electronically Signed: Margarito Crockett MD    6/26/2025 4:09 PM EDT    Workstation ID: [id number]    CT Abdomen Pelvis With Contrast " [[id number]] Collected: 06/26/25 1433     Updated: 06/26/25 1443    Narrative:      CT ABDOMEN PELVIS W CONTRAST    Date of Exam: 6/26/2025 2:13 PM EDT    Indication: ABD PAIN.    Comparison: None available.    Technique: Axial CT images were obtained of the abdomen and pelvis after the uneventful intravenous administration of iodinated contrast. Reconstructed coronal and sagittal images were also obtained. Automated exposure control and iterative construction   methods were used.    Findings:    Liver: The liver is unremarkable in morphology. No focal liver lesion is seen. No biliary dilation is seen.    Gallbladder: Unremarkable.    Pancreas: Unremarkable.    Spleen: Unremarkable.    Adrenal glands: Unremarkable.    Genitourinary tract: Kidneys are unremarkable. No hydronephrosis is seen. The visualized portions of the ureters are unremarkable. The urinary bladder is decompressed which limits its evaluation. Pelvic organs demonstrate no acute abnormality. 2.6 cm   left uterine mass likely represents a fibroid.    Gastrointestinal tract: Suggestion of mild colonic wall thickening and mucosal hyperenhancement with moderate colonic fluid. Infectious or inflammatory colitis may have this appearance. Please correlate clinically. Remaining hollow viscera appear   unremarkable. No findings to suggest bowel obstruction.    Appendix: No findings to suggest acute appendicitis.    Other findings: No free air or free fluid is identified. No pathologically enlarged lymph nodes are seen. Vascular calcifications are present. The IVC is unremarkable. Portosystemic venous collateral noted within the left abdomen draining to the left   renal vein.    Bones and soft tissues: No acute or suspicious osseous or soft tissue lesion is identified.    Lung bases: The visualized lung bases are clear.      Impression:      Impression:  1.Suggestion of mild colonic wall thickening and mucosal hyperenhancement with moderate colonic fluid.  Infectious or inflammatory colitis may have this appearance.  2.Portosystemic venous collateral noted within the left abdomen draining to the left renal vein.  3.Probable 2.6 cm uterine fibroid.  4.Additional findings as detailed above.        Electronically Signed: Pietro Galvan MD    6/26/2025 2:41 PM EDT    Workstation ID: [id number]            Medications:    Current Facility-Administered Medications:     acetaminophen (TYLENOL) tablet 650 mg, 650 mg, Oral, Q4H PRN **OR** acetaminophen (TYLENOL) suppository 650 mg, 650 mg, Rectal, Q4H PRN, Olvera, Dimpi, DO    atorvastatin (LIPITOR) tablet 20 mg, 20 mg, Oral, Nightly, Olvera, Dimpi, DO    sennosides-docusate (PERICOLACE) 8.6-50 MG per tablet 2 tablet, 2 tablet, Oral, BID **AND** polyethylene glycol (MIRALAX) packet 17 g, 17 g, Oral, Daily PRN **AND** bisacodyl (DULCOLAX) EC tablet 5 mg, 5 mg, Oral, Daily PRN **AND** bisacodyl (DULCOLAX) suppository 10 mg, 10 mg, Rectal, Daily PRN, Olvera, Dimpi, DO    dextrose (D50W) (25 g/50 mL) IV injection 25 g, 25 g, Intravenous, Q15 Min PRN, Olvera, Dimpi, DO    dextrose (GLUTOSE) oral gel 15 g, 15 g, Oral, Q15 Min PRN, Olvera, Dimpi, DO    enoxaparin sodium (LOVENOX) syringe 40 mg, 40 mg, Subcutaneous, Daily, Olvera, Dimpi, DO    famotidine (PEPCID) tablet 20 mg, 20 mg, Oral, BID AC, Olvera, Dimpi, DO, 20 mg at 06/27/25 0737    glucagon (GLUCAGEN) injection 1 mg, 1 mg, Intramuscular, Q15 Min PRN, Olvera, Dimpi, DO    Insulin Lispro (humaLOG) injection 2-7 Units, 2-7 Units, Subcutaneous, 4x Daily AC & at Bedtime, Olvera, Dimpi, DO    lactated ringers bolus 500 mL, 500 mL, Intravenous, Once, Yaakov Perez MD, Last Rate: 1,000 mL/hr at 06/27/25 0410, Currently Infusing at 06/27/25 0410    lactated ringers bolus 500 mL, 500 mL, Intravenous, Once, Yaakov Perez MD, Last Rate: 1,000 mL/hr at 06/27/25 0442, Currently Infusing at 06/27/25 0442    lactated ringers infusion, 125 mL/hr, Intravenous, Continuous, Wade  Dimpi, DO, Last Rate: 125 mL/hr at 06/26/25 1745, 125 mL/hr at 06/26/25 1745    mupirocin (BACTROBAN) 2 % nasal ointment 1 Application, 1 Application, Each Nare, BID, Olvera, Dimpi, DO, 1 Application at 06/26/25 2045    nitroglycerin (NITROSTAT) SL tablet 0.4 mg, 0.4 mg, Sublingual, Q5 Min PRN, Olvera, Dimpi, DO    ondansetron ODT (ZOFRAN-ODT) disintegrating tablet 4 mg, 4 mg, Oral, Q6H PRN **OR** ondansetron (ZOFRAN) injection 4 mg, 4 mg, Intravenous, Q6H PRN, Olvera, Dimpi, DO    piperacillin-tazobactam (ZOSYN) IVPB 3.375 g IVPB in 100 mL NS (VTB), 3.375 g, Intravenous, Q8H, Olvera, Dimpi, DO, 3.375 g at 06/27/25 0740    prazosin (MINIPRESS) capsule 2 mg, 2 mg, Oral, Nightly, Olvera, Dimpi, DO, 2 mg at 06/26/25 2044    sodium chloride 0.9 % flush 10 mL, 10 mL, Intravenous, Q12H, Howard Forbes MD, 10 mL at 06/27/25 0916    sodium chloride 0.9 % flush 10 mL, 10 mL, Intravenous, Q12H, Olvera, Dimpi, DO, 10 mL at 06/27/25 0916    sodium chloride 0.9 % flush 10 mL, 10 mL, Intravenous, PRN, Olvera, Dimpi, DO, 10 mL at 06/26/25 2045    sodium chloride 0.9 % infusion 40 mL, 40 mL, Intravenous, PRN, Olvera, Dimpi, DO    venlafaxine XR (EFFEXOR-XR) 24 hr capsule 225 mg, 225 mg, Oral, Daily, Olvera, Dimpi, DO, 225 mg at 06/26/25 2044    Assessment & Plan       Colitis    No surgery indicated at this time  Ok for regular diet          Electronically signed by KIMBERLY Vale, 06/27/25, 2:42 PM EDT.

## 2025-06-27 NOTE — THERAPY EVALUATION
Acute Care - Physical Therapy Initial Evaluation   [facility]     Patient Name: Suyapa Barrera  : 1955  MRN: [id number]  Today's Date: 2025      Visit Dx:     ICD-10-CM ICD-9-CM   1. Uncontrolled type 2 diabetes mellitus with hyperglycemia  E11.65 250.02   2. Diabetic ketoacidosis without coma associated with type 2 diabetes mellitus  E11.10 250.12   3. Colitis  K52.9 558.9   4. Difficulty in walking  R26.2 719.7     Patient Active Problem List   Diagnosis    Depression with anxiety    Chronic pain of left wrist    Degenerative disc disease, lumbar    Esophageal reflux    HLD (hyperlipidemia)    HTN (hypertension)    Type 2 diabetes mellitus with hyperglycemia, without long-term current use of insulin    Obstructive sleep apnea, adult    Panic attacks    Vitamin D deficiency    Bicipital tenosynovitis    Full thickness rotator cuff tear    Overweight (BMI 25.0-29.9)    Positive colorectal cancer screening using Cologuard test    PTSD (post-traumatic stress disorder)    Cervicalgia    Acute cystitis without hematuria    Colitis     Past Medical History:   Diagnosis Date    Arthritis 2023    Callus     Depression 2019    Diverticulosis 2016    Fracture, foot     Lisfranc injury     Full thickness rotator cuff tear 2022    Hyperlipidemia 2011    Hypertension 2011    Low back pain 2024    Low back strain     Plantar fasciitis 2017    Rotator cuff syndrome     Surgery     Sleep apnea 2012    New sleep study to be performed on 3/20/2023    Tear of meniscus of knee     Surgery in ,  and     Type 2 diabetes mellitus with hyperglycemia, without long-term current use of insulin 2022     Past Surgical History:   Procedure Laterality Date    ABDOMINOPLASTY      tummy tuck    BREAST SURGERY  1998    Bilateral reduction    COLONOSCOPY  Dec 2016    COLONOSCOPY N/A 2024    Procedure: COLONOSCOPY WITH COLD AND HOT SNARE POLYPECTOMIES, BOB  INJECTION, CLIP APPLICATION X 1;  Surgeon: Fer Dale MD;  Location: [facility] ENDOSCOPY;  Service: Gastroenterology;  Laterality: N/A;  COLON POLYPS    EYE SURGERY  Aug 2019    Lasik    HAND SURGERY Left     thumb joint removal    KNEE SURGERY  91, 92 and 2014    SHOULDER SURGERY  4/14    TUBAL ABDOMINAL LIGATION  Sep 1992     PT Assessment (Last 12 Hours)       PT Evaluation and Treatment       Row Name 06/27/25 0900          Physical Therapy Time and Intention    Subjective Information no complaints (P)   -DF     Document Type evaluation (P)   -DF     Mode of Treatment individual therapy;physical therapy (P)   -DF     Patient Effort good (P)   -DF     Symptoms Noted During/After Treatment none (P)   -DF       Row Name 06/27/25 0900          General Information    Patient Profile Reviewed yes (P)   -DF     Patient Observations alert;cooperative;agree to therapy (P)   -DF     Prior Level of Function independent:;gait;transfer;ADL's (P)   -DF     Equipment Currently Used at Home none (P)   -DF     Existing Precautions/Restrictions no known precautions/restrictions (P)   -DF     Barriers to Rehab none identified (P)   -DF       Row Name 06/27/25 0900          Living Environment    Current Living Arrangements home (P)   -DF     People in Home alone (P)   -DF     Primary Care Provided by self (P)   -DF       Row Name 06/27/25 0900          Home Use of Assistive/Adaptive Equipment    Equipment Currently Used at Home none (P)   -DF       Row Name 06/27/25 0900          Range of Motion (ROM)    Range of Motion ROM is WNL;bilateral lower extremities (P)   -DF       Row Name 06/27/25 0900          Strength (Manual Muscle Testing)    Strength (Manual Muscle Testing) strength is WNL;bilateral lower extremities (P)   -DF       Row Name 06/27/25 0900          Bed Mobility    Bed Mobility supine-sit;sit-supine (P)   -DF     Supine-Sit [place] (Bed Mobility) independent (P)   -DF     Sit-Supine [place] (Bed  Mobility) independent (P)   -DF       Row Name 06/27/25 0900          Transfers    Transfers sit-stand transfer;stand-sit transfer (P)   -DF       Row Name 06/27/25 0900          Sit-Stand Transfer    Sit-Stand [place] (Transfers) independent (P)   -DF       Row Name 06/27/25 0900          Stand-Sit Transfer    Stand-Sit [place] (Transfers) independent (P)   -DF       Row Name 06/27/25 0900          Gait/Stairs (Locomotion)    Gait/Stairs Locomotion gait/ambulation independence (P)   -DF     [place] Level (Gait) standby assist (P)   -DF     Patient was able to Ambulate yes (P)   -DF     Distance in Feet (Gait) 100 (P)   -DF     Pattern (Gait) step-through (P)   -DF       Row Name 06/27/25 0900          Balance    Balance Assessment standing dynamic balance (P)   -DF     Dynamic Standing Balance standby assist (P)   -DF       Row Name 06/27/25 0900          Plan of Care Review    Plan of Care Reviewed With patient (P)   -DF     Outcome Evaluation Pt did not present with significant impairments requiring skilled PT. Pt reported recent surgery on L thumb. PT recommends discharge to home with outpatient services for previously mentioned surgical procedure. (P)   -DF       [facility] Name 06/27/25 0900          Therapy Assessment/Plan (PT)    Rehab Potential (PT) good (P)   -DF     Criteria for Skilled Interventions Met (PT) no (P)   -DF     Therapy Frequency (PT) evaluation only (P)   -DF       Row Name 06/27/25 0900          PT Evaluation Complexity    History, PT Evaluation Complexity no personal factors and/or comorbidities (P)   -DF     Examination of Body Systems (PT Eval Complexity) total of 4 or more elements (P)   -DF     Clinical Presentation (PT Evaluation Complexity) stable (P)   -DF     Clinical Decision Making (PT Evaluation Complexity) low complexity (P)   -DF     Overall Complexity (PT Evaluation Complexity) low complexity (P)   -DF       Row Name 06/27/25 0900          Therapy Plan  Review/Discharge Plan (PT)    Therapy Plan Review (PT) evaluation/treatment results reviewed (P)   -DF       Row Name 06/27/25 0900          Physical Therapy Goals    Problem Specific Goal Selection (PT) problem specific goal 1, PT (P)   -DF       Row Name 06/27/25 0900          Problem Specific Goal 1 (PT)    Problem Specific Goal 1 (PT) Get back home (P)   -DF     Time Frame (Problem Specific Goal 1, PT) long-term goal (LTG);2 weeks (P)   -DF               User Key  (r) = Recorded By, (t) = Taken By, (c) = Cosigned By      Initials Name Provider Type    DF Benjamin Benjamin, PT Student PT Student                    Physical Therapy Education       Title: PT OT SLP Therapies (Done)       Topic: Physical Therapy (Done)       Point: Mobility training (Done)       Learning Progress Summary            Patient Acceptance, E,TB, VU by DF at 6/27/2025 0930                                      User Key       Initials Effective Dates Name Provider Type Discipline    DF 06/04/25 -  Benjamin Benjamin, PT Student PT Student PT                  PT Recommendation and Plan  Anticipated Discharge Disposition (PT): (P) home with outpatient therapy services  Therapy Frequency (PT): (P) evaluation only  Plan of Care Reviewed With: (P) patient  Outcome Evaluation: (P) Pt did not present with significant impairments requiring skilled PT. Pt reported recent surgery on L thumb. PT recommends discharge to home with outpatient services for previously mentioned surgical procedure.   Outcome Measures       Row Name 06/27/25 0900             How much help from another person do you currently need...    Turning from your back to your side while in flat bed without using bedrails? 4 (P)   -DF      Moving from lying on back to sitting on the side of a flat bed without bedrails? 4 (P)   -DF      Moving to and from a bed to a chair (including a wheelchair)? 4 (P)   -DF      Standing up from a chair using your arms (e.g., wheelchair, bedside  chair)? 4 (P)   -DF      Climbing 3-5 steps with a railing? 4 (P)   -DF      To walk in hospital room? 4 (P)   -DF      AM-PAC 6 Clicks Score (PT) 24 (P)   -DF         Functional Assessment    Outcome Measure Options AM-PAC 6 Clicks Basic Mobility (PT) (P)   -DF                User Key  (r) = Recorded By, (t) = Taken By, (c) = Cosigned By      Initials Name Provider Type    Benjamin Gonzáles, PT Student PT Student                     Time Calculation:    PT Charges       Row Name 06/27/25 0926             Time Calculation    PT Received On 06/27/25 (P)   -DF         Untimed Charges    PT Eval/Re-eval Minutes 15 (P)   -DF         Total Minutes    Untimed Charges Total Minutes 15 (P)   -DF       Total Minutes 15 (P)   -DF                User Key  (r) = Recorded By, (t) = Taken By, (c) = Cosigned By      Initials Name Provider Type    Benjamin Gonzáles, PT Student PT Student                      PT G-Codes  Outcome Measure Options: (P) AM-PAC 6 Clicks Basic Mobility (PT)  AM-PAC 6 Clicks Score (PT): (P) 24    TRAVIS Moore  6/27/2025

## 2025-06-27 NOTE — PLAN OF CARE
Goal Outcome Evaluation:  Plan of Care Reviewed With: patient        Progress: improving  Outcome Evaluation: a&ox4, on room air, up ad-neymar. transferred to 4NT this shift. c-diff came back negative, IV abx given per orders, BG checked per order. two sets of eyes completed with KHANH Curiel. no issues or concerns at this time.

## 2025-06-28 ENCOUNTER — READMISSION MANAGEMENT (OUTPATIENT)
Dept: CALL CENTER | Facility: HOSPITAL | Age: 70
End: 2025-06-28
Payer: MEDICARE

## 2025-06-28 VITALS
SYSTOLIC BLOOD PRESSURE: 137 MMHG | HEART RATE: 86 BPM | BODY MASS INDEX: 30.37 KG/M2 | DIASTOLIC BLOOD PRESSURE: 68 MMHG | HEIGHT: 64 IN | TEMPERATURE: 98.4 F | WEIGHT: 177.91 LBS | RESPIRATION RATE: 18 BRPM | OXYGEN SATURATION: 95 %

## 2025-06-28 PROBLEM — Z78.9 MEDICATION INTOLERANCE: Status: ACTIVE | Noted: 2025-06-28

## 2025-06-28 PROBLEM — R11.2 NAUSEA AND VOMITING: Status: ACTIVE | Noted: 2025-06-28

## 2025-06-28 LAB
ANION GAP SERPL CALCULATED.3IONS-SCNC: 10.5 MMOL/L (ref 5–15)
BASOPHILS # BLD AUTO: 0.05 10*3/MM3 (ref 0–0.2)
BASOPHILS NFR BLD AUTO: 0.4 % (ref 0–1.5)
BUN SERPL-MCNC: 13.1 MG/DL (ref 8–23)
BUN/CREAT SERPL: 14.4 (ref 7–25)
C COLI+JEJ+UPSA DNA STL QL NAA+NON-PROBE: NOT DETECTED
CALCIUM SPEC-SCNC: 8.5 MG/DL (ref 8.6–10.5)
CHLORIDE SERPL-SCNC: 109 MMOL/L (ref 98–107)
CO2 SERPL-SCNC: 21.5 MMOL/L (ref 22–29)
CREAT SERPL-MCNC: 0.91 MG/DL (ref 0.57–1)
CRYPTOSP DNA STL QL NAA+NON-PROBE: NOT DETECTED
DEPRECATED RDW RBC AUTO: 44.8 FL (ref 37–54)
E HISTOLYT DNA STL QL NAA+NON-PROBE: NOT DETECTED
EC STX1+STX2 GENES STL QL NAA+NON-PROBE: NOT DETECTED
EGFRCR SERPLBLD CKD-EPI 2021: 68.9 ML/MIN/1.73
EOSINOPHIL # BLD AUTO: 0.07 10*3/MM3 (ref 0–0.4)
EOSINOPHIL NFR BLD AUTO: 0.6 % (ref 0.3–6.2)
ERYTHROCYTE [DISTWIDTH] IN BLOOD BY AUTOMATED COUNT: 13.2 % (ref 12.3–15.4)
G LAMBLIA DNA STL QL NAA+NON-PROBE: NOT DETECTED
GLUCOSE BLDC GLUCOMTR-MCNC: 118 MG/DL (ref 70–99)
GLUCOSE BLDC GLUCOMTR-MCNC: 84 MG/DL (ref 70–99)
GLUCOSE SERPL-MCNC: 84 MG/DL (ref 65–99)
HCT VFR BLD AUTO: 37.4 % (ref 34–46.6)
HGB BLD-MCNC: 12.3 G/DL (ref 12–15.9)
IMM GRANULOCYTES # BLD AUTO: 0.06 10*3/MM3 (ref 0–0.05)
IMM GRANULOCYTES NFR BLD AUTO: 0.5 % (ref 0–0.5)
LYMPHOCYTES # BLD AUTO: 1.94 10*3/MM3 (ref 0.7–3.1)
LYMPHOCYTES NFR BLD AUTO: 16.5 % (ref 19.6–45.3)
MCH RBC QN AUTO: 30.5 PG (ref 26.6–33)
MCHC RBC AUTO-ENTMCNC: 32.9 G/DL (ref 31.5–35.7)
MCV RBC AUTO: 92.8 FL (ref 79–97)
MONOCYTES # BLD AUTO: 0.56 10*3/MM3 (ref 0.1–0.9)
MONOCYTES NFR BLD AUTO: 4.8 % (ref 5–12)
NEUTROPHILS NFR BLD AUTO: 77.2 % (ref 42.7–76)
NEUTROPHILS NFR BLD AUTO: 9.07 10*3/MM3 (ref 1.7–7)
NRBC BLD AUTO-RTO: 0 /100 WBC (ref 0–0.2)
PLATELET # BLD AUTO: 141 10*3/MM3 (ref 140–450)
PMV BLD AUTO: 10.4 FL (ref 6–12)
POTASSIUM SERPL-SCNC: 4.1 MMOL/L (ref 3.5–5.2)
RBC # BLD AUTO: 4.03 10*6/MM3 (ref 3.77–5.28)
S ENT+BONG DNA STL QL NAA+NON-PROBE: NOT DETECTED
SHIGELLA SP+EIEC IPAH ST NAA+NON-PROBE: NOT DETECTED
SODIUM SERPL-SCNC: 141 MMOL/L (ref 136–145)
WBC NRBC COR # BLD AUTO: 11.75 10*3/MM3 (ref 3.4–10.8)

## 2025-06-28 PROCEDURE — 82948 REAGENT STRIP/BLOOD GLUCOSE: CPT | Performed by: HOSPITALIST

## 2025-06-28 PROCEDURE — 25010000002 PIPERACILLIN SOD-TAZOBACTAM PER 1 G: Performed by: HOSPITALIST

## 2025-06-28 PROCEDURE — 80048 BASIC METABOLIC PNL TOTAL CA: CPT | Performed by: HOSPITALIST

## 2025-06-28 PROCEDURE — 85025 COMPLETE CBC W/AUTO DIFF WBC: CPT | Performed by: HOSPITALIST

## 2025-06-28 PROCEDURE — 99239 HOSP IP/OBS DSCHRG MGMT >30: CPT | Performed by: INTERNAL MEDICINE

## 2025-06-28 RX ADMIN — PIPERACILLIN AND TAZOBACTAM 3.38 G: 3; .375 INJECTION, POWDER, FOR SOLUTION INTRAVENOUS at 00:51

## 2025-06-28 RX ADMIN — DOCUSATE SODIUM 50MG AND SENNOSIDES 8.6MG 2 TABLET: 8.6; 5 TABLET, FILM COATED ORAL at 08:11

## 2025-06-28 RX ADMIN — FAMOTIDINE 20 MG: 20 TABLET, FILM COATED ORAL at 08:11

## 2025-06-28 RX ADMIN — Medication 10 ML: at 08:12

## 2025-06-28 RX ADMIN — PIPERACILLIN AND TAZOBACTAM 3.38 G: 3; .375 INJECTION, POWDER, FOR SOLUTION INTRAVENOUS at 08:11

## 2025-06-28 RX ADMIN — Medication 10 ML: at 08:11

## 2025-06-28 NOTE — OUTREACH NOTE
Prep Survey      Flowsheet Row Responses   [facility] patient discharged from? [facility]   Is LACE score < 7 ? Yes   Eligibility [facility]   Date of Admission 06/26/25   Date of Discharge 06/28/25   Discharge Disposition Home or Self Care   Discharge diagnosis DKA (diabetic ketoacidosis)   Does the patient have one of the following disease processes/diagnoses(primary or secondary)? Other   Does the patient have Home health ordered? No   Is there a DME ordered? No   Prep survey completed? Yes            Gladis WADE - Registered Nurse

## 2025-06-28 NOTE — DISCHARGE SUMMARY
[facility]         HOSPITALIST  DISCHARGE SUMMARY    Patient Name: Suyapa Barrera  : 1955  MRN: [id number]    Date of Admission: 2025  Date of Discharge:  2025    Primary Care Physician: Robin Cavazos DO    Consults       No orders found from 2025 to 2025.            Active and Resolved Hospital Problems:  Active Hospital Problems    Diagnosis POA    Medication intolerance [Z78.9] Unknown    Nausea and vomiting [R11.2] Unknown    Colitis [K52.9] Yes      Resolved Hospital Problems    Diagnosis POA    **DKA (diabetic ketoacidosis) [E11.10] Yes       Hospital Course     Hospital Course:  Suyapa Barrera is a 70 y.o. female with a past medical history of depression, hypertension, hyperlipidemia who presents to the emergency room with diffuse abdominal pain and diarrhea.  Prior to today patient was asymptomatic.  She has had multiple bouts of diarrhea was brought to the ED.  Patient is taking Mounjaro for 1.5 years.  She had a similar episode 1 year ago. Patient had an ultrasound guided biopsy of her thyroid on 2025. On arrival to the ED, patient temperature 98.1, pulse of 87, respiratory rate of 18, blood pressure 96/57, and she was saturating 96% on room air. Chest x-ray is clear. CT abdomen and pelvis with contrast: Suggestion of mild colonic wall thickening and mucosal hyperenhancement with mild colonic fluid.  Infectious or inflammatory colitis may have this appearance.  Portosystemic venous collateral noted within the left abdomen draining to the left renal vein.  Probable 2.6 cm uterine fibroid. AB.1/39.9/40.4/14.4.  Initial troponin was 29, second troponin was 71.  Delta T of 42.  Patient denies any chest pain.  Patient sodium is 139, CO2 is 16.7, anion gap is 15.3, creatinine is 1.37, glucose is 167.  Calcium is 8.3.  Initial lactic was 9.3, follow-up lactic was 5.8.  White blood cell count is 16.56, hemoglobin is 17.5.  Patient was admitted to the  hospital she was started on IV fluid resuscitation, she was also started on antibiotic Zosyn.  Patient was initially admitted to the intensive care was seen by the intensivist.  It was felt that patient's symptoms were consistent with GLP-1 toxicity or side effect.  Within 24 hours patient improved significantly.  Patient's white count has almost normalized, she denies any nausea vomiting or abdominal pain.  She does have cramping when she eats and is having some loose stool however overall feeling significantly better.  Patient was also seen by general surgery.  No surgical intervention at this time.  Patient will be discharged home today in stable condition with close outpatient follow-up           DISCHARGE Follow Up Recommendations for labs and diagnostics:   Patient should follow-up with her primary care in 1 week  At this time would avoid any further GLP-1 medication      Day of Discharge     Vital Signs:  Temp:  [98 °F (36.7 °C)-98.7 °F (37.1 °C)] 98.4 °F (36.9 °C)  Heart Rate:  [81-90] 86  Resp:  [18] 18  BP: (107-140)/(53-68) 137/68  Physical Exam:   General: Awake, alert, NAD  HENT: NCAT, MMM  Eyes: pupils equal, no scleral icterus  Cardiovascular: RRR, no murmurs   Pulmonary: CTA bilaterally; no wheezes; no conversational dyspnea  Gastrointestinal: S/ND/NT, +BS  Musculoskeletal: No gross deformities  Skin: No jaundice, no rash on exposed skin appreciated  Neuro: CN II through XII grossly intact; speech clear; no tremor  Psych: Mood and affect appropriate  : No Hu catheter; no suprapubic tenderness      Discharge Details        Discharge Medications        Continue These Medications        Instructions Start Date   lisinopril 30 MG tablet  Commonly known as: PRINIVIL,ZESTRIL   30 mg, Oral, Every Other Day      omeprazole 40 MG capsule  Commonly known as: priLOSEC   40 mg, Oral, Daily      ondansetron ODT 8 MG disintegrating tablet  Commonly known as: ZOFRAN-ODT   8 mg, Translingual, Every 8 Hours  PRN      simvastatin 40 MG tablet  Commonly known as: ZOCOR   40 mg, Oral, Nightly      venlafaxine  MG 24 hr capsule  Commonly known as: EFFEXOR-XR   300 mg, Oral, Daily             Stop These Medications      Mounjaro 2.5 MG/0.5ML solution auto-injector  Generic drug: Tirzepatide              Allergies   Allergen Reactions    Ozempic (0.25 Or 0.5 Mg-Dose) [Semaglutide(0.25 Or 0.5mg-Dos)] Shortness Of Breath and GI Intolerance    Azithromycin GI Intolerance       Discharge Disposition:  Home or Self Care    Diet:  Hospital:No active diet order      Discharge Activity: as tolerated       CODE STATUS:  Code Status and Medical Interventions: CPR (Attempt to Resuscitate); Full Support   Ordered at: 06/26/25 1712     Code Status (Patient has no pulse and is not breathing):    CPR (Attempt to Resuscitate)     Medical Interventions (Patient has pulse or is breathing):    Full Support     Level Of Support Discussed With:    Patient         Future Appointments   Date Time Provider Department Center   7/3/2025 11:00 AM Alannah De Santiago OT [facility] PT [facility]   7/14/2025 11:00 AM JUANA BRADEN 1  JUANA ETWDX [facility]   7/24/2025  7:45 AM Robin Cavazos DO [facility]   8/4/2025  1:00 PM NURSE/MA GASTRO [place] [facility]UA[facility]       Additional Instructions for the Follow-ups that You Need to Schedule       Discharge Follow-up with PCP   As directed       Currently Documented PCP:    Robin Cavazos DO    PCP Phone Number:    [phone]     Follow Up Details: in 1 week                Pertinent  and/or Most Recent Results     PROCEDURES:   None     LAB RESULTS:      Lab 06/28/25  0419 06/27/25  0200 06/26/25  2232 06/26/25  1947 06/26/25  1556 06/26/25  1325   WBC 11.75* 25.34*  --   --   --  16.56*   HEMOGLOBIN 12.3 14.1  --   --   --  17.5*   HEMATOCRIT 37.4 40.0  --   --   --  53.8*   PLATELETS 141 178  --   --   --  283   NEUTROS ABS 9.07* 22.25*  --   --   --  11.71*   IMMATURE GRANS (ABS) 0.06* 0.51*  --   --   --   0.13*   LYMPHS ABS 1.94 0.94  --   --   --  4.09*   MONOS ABS 0.56 0.88  --   --   --  0.43   EOS ABS 0.07 0.64*  --   --   --  0.10   MCV 92.8 88.3  --   --   --  94.1   LACTATE  --   --  3.1* 3.4* 5.8* 9.3*         Lab 06/28/25  0419 06/27/25  0241 06/26/25  1556 06/26/25  1350 06/26/25  1325   SODIUM 141 136 139 137  --    POTASSIUM 4.1 4.2 4.1 3.6  --    CHLORIDE 109* 107 107 101  --    CO2 21.5* 16.6* 16.7* 11.9*  --    ANION GAP 10.5 12.4 15.3* 24.1*  --    BUN 13.1 21.5 25.1* 23.1*  --    CREATININE 0.91 1.08* 1.37* 1.52*  --    EGFR 68.9 56.1* 42.1* 37.2*  --    GLUCOSE 84 151* 167* 310*  --    CALCIUM 8.5* 8.0* 8.3* 9.0  --    MAGNESIUM  --  1.7 1.8 2.3  --    PHOSPHORUS  --  3.3 2.8 4.3  --    HEMOGLOBIN A1C  --   --   --   --  5.60         Lab 06/26/25  1350   TOTAL PROTEIN 7.0   ALBUMIN 4.0   GLOBULIN 3.0   ALT (SGPT) 14   AST (SGOT) 32   BILIRUBIN 1.4*   ALK PHOS 95   LIPASE 65*         Lab 06/26/25  1556 06/26/25  1350   HSTROP T 71* 29*                 Brief Urine Lab Results  (Last result in the past 365 days)        Color   Clarity   Blood   Leuk Est   Nitrite   Protein   CREAT   Urine HCG        06/27/25 1455 Yellow   Clear   Negative   Negative   Negative   Negative                 Microbiology Results (last 10 days)       Procedure Component Value - Date/Time    Enteric Bacterial Panel - Stool, Per Rectum [[id number]]  (Normal) Collected: 06/27/25 1455    Lab Status: Final result Specimen: Stool from Per Rectum Updated: 06/28/25 1338     Salmonella Not Detected     Campylobacter Not Detected     Shigella/Enteroinvasive E. coli (EIEC) Not Detected     Shiga-like toxin-producing E. coli (STEC) stx1/stx2 Not Detected    Enteric Parasite Panel - Stool, Per Rectum [[id number]]  (Normal) Collected: 06/27/25 1455    Lab Status: Final result Specimen: Stool from Per Rectum Updated: 06/28/25 1343     Giardia lamblia Not Detected     Cryptosporidium Not Detected     Entamoeba histolytica Not Detected     Clostridioides difficile Toxin - Stool, Per Rectum [[id number]] Collected: 06/27/25 1455    Lab Status: Final result Specimen: Stool from Per Rectum Updated: 06/27/25 1544    Narrative:      The following orders were created for panel order Clostridioides difficile Toxin - Stool, Per Rectum.  Procedure                               Abnormality         Status                     ---------                               -----------         ------                     Clostridioides difficile...[[id number]]                      Final result                 Please view results for these tests on the individual orders.    Clostridioides difficile Toxin, PCR - Stool, Per Rectum [[id number]] Collected: 06/27/25 1455    Lab Status: Final result Specimen: Stool from Per Rectum Updated: 06/27/25 1544     Toxigenic C. difficile by PCR Negative     027 Toxin Presumptive Negative    Narrative:      The result indicates the absence of toxigenic C. difficile from stool specimen.             XR Chest 1 View  Result Date: 6/26/2025  Impression: No evidence of fluid overload. Electronically Signed: Margarito Crockett MD  6/26/2025 4:09 PM EDT  Workstation ID: [id number]    CT Abdomen Pelvis With Contrast  Result Date: 6/26/2025  Impression: 1.Suggestion of mild colonic wall thickening and mucosal hyperenhancement with moderate colonic fluid. Infectious or inflammatory colitis may have this appearance. 2.Portosystemic venous collateral noted within the left abdomen draining to the left renal vein. 3.Probable 2.6 cm uterine fibroid. 4.Additional findings as detailed above. Electronically Signed: Pietro Galvan MD  6/26/2025 2:41 PM EDT  Workstation ID: [id number]                   Labs Pending at Discharge:  Pending Labs       Order Current Status    ThyGenX(TM) With / ThyraMIR (TM) Rfx In process                Time spent on Discharge including face to face service:  more than 35 minutes    Electronically signed by Juan Valenzuela DO, 06/28/25, 4:52  PM EDT.

## 2025-06-28 NOTE — PLAN OF CARE
Goal Outcome Evaluation:  Plan of Care Reviewed With: patient        Progress: improving  Outcome Evaluation: a&ox4, on room air, up ad-neymar. discharging home via personal vehicle. medications sent to perferred pharmacy. IV abx given per order. BG monitored per order. no issues or concerns at this time.

## 2025-06-30 ENCOUNTER — TRANSITIONAL CARE MANAGEMENT TELEPHONE ENCOUNTER (OUTPATIENT)
Dept: CALL CENTER | Facility: HOSPITAL | Age: 70
End: 2025-06-30
Payer: MEDICARE

## 2025-06-30 NOTE — OUTREACH NOTE
Call Center TCM Note      Flowsheet Row Responses   [facility] patient discharged from? [facility]   Does the patient have one of the following disease processes/diagnoses(primary or secondary)? Other   TCM attempt successful? Yes   Call start time 1222   Call end time 1223   Discharge diagnosis DKA (diabetic ketoacidosis)   Person spoke with today (if not patient) and relationship patient   Meds reviewed with patient/caregiver? Yes   Does the patient have all medications ordered at discharge? Yes   Prescription comments Stop taking Mounjaro   Is the patient taking all medications as directed (includes completed medication regime)? Yes   Comments 7/2/2025  9:00 AM Arrive by 8:45 AM HOSPITAL FOLLOW UP 30 min [facility] Robin Cavazos, DO   Does the patient have an appointment with their PCP within 7-14 days of discharge? Yes   Has home health visited the patient within 72 hours of discharge? N/A   Psychosocial issues? No   Did the patient receive a copy of their discharge instructions? Yes   Nursing interventions Reviewed instructions with patient   What is the patient's perception of their health status since discharge? Improving   Is the patient/caregiver able to teach back signs and symptoms related to disease process for when to call PCP? Yes   Is the patient/caregiver able to teach back signs and symptoms related to disease process for when to call 911? Yes   Is the patient/caregiver able to teach back the hierarchy of who to call/visit for symptoms/problems? PCP, Specialist, Home health nurse, Urgent Care, ED, 911 Yes   TCM call completed? Yes   Wrap up additional comments DC from [facility]- Patient was advised to stop GLP-1 medication. Since being DC no abdominal pain or diarrhea noted. Patient to see pcp this week. Patient is unsure of Blood Glucose as she has nothing at home to monitor glucose. No other concerns or questions noted.   Call end time 1223   Would this  patient benefit from a Referral to [facility] Social Work? No   Is the patient interested in additional calls from an ambulatory ? No            Joann ANSARI - Registered Nurse    6/30/2025, 12:26 EDT

## 2025-07-02 ENCOUNTER — OFFICE VISIT (OUTPATIENT)
Dept: FAMILY MEDICINE CLINIC | Facility: CLINIC | Age: 70
End: 2025-07-02
Payer: MEDICARE

## 2025-07-02 VITALS
RESPIRATION RATE: 16 BRPM | TEMPERATURE: 97.7 F | SYSTOLIC BLOOD PRESSURE: 121 MMHG | DIASTOLIC BLOOD PRESSURE: 69 MMHG | OXYGEN SATURATION: 100 % | BODY MASS INDEX: 30.05 KG/M2 | WEIGHT: 176 LBS | HEART RATE: 99 BPM | HEIGHT: 64 IN

## 2025-07-02 DIAGNOSIS — E66.811 CLASS 1 OBESITY DUE TO EXCESS CALORIES WITH SERIOUS COMORBIDITY AND BODY MASS INDEX (BMI) OF 30.0 TO 30.9 IN ADULT: Chronic | ICD-10-CM

## 2025-07-02 DIAGNOSIS — F43.10 PTSD (POST-TRAUMATIC STRESS DISORDER): Chronic | ICD-10-CM

## 2025-07-02 DIAGNOSIS — F51.01 PRIMARY INSOMNIA: Chronic | ICD-10-CM

## 2025-07-02 DIAGNOSIS — K52.9 CHRONIC DIARRHEA: Chronic | ICD-10-CM

## 2025-07-02 DIAGNOSIS — E11.65 TYPE 2 DIABETES MELLITUS WITH HYPERGLYCEMIA, WITHOUT LONG-TERM CURRENT USE OF INSULIN: Chronic | ICD-10-CM

## 2025-07-02 DIAGNOSIS — E66.09 CLASS 1 OBESITY DUE TO EXCESS CALORIES WITH SERIOUS COMORBIDITY AND BODY MASS INDEX (BMI) OF 30.0 TO 30.9 IN ADULT: Chronic | ICD-10-CM

## 2025-07-02 DIAGNOSIS — I10 PRIMARY HYPERTENSION: Primary | Chronic | ICD-10-CM

## 2025-07-02 PROCEDURE — 3074F SYST BP LT 130 MM HG: CPT | Performed by: FAMILY MEDICINE

## 2025-07-02 PROCEDURE — 99214 OFFICE O/P EST MOD 30 MIN: CPT | Performed by: FAMILY MEDICINE

## 2025-07-02 PROCEDURE — 1126F AMNT PAIN NOTED NONE PRSNT: CPT | Performed by: FAMILY MEDICINE

## 2025-07-02 PROCEDURE — 1159F MED LIST DOCD IN RCRD: CPT | Performed by: FAMILY MEDICINE

## 2025-07-02 PROCEDURE — 3078F DIAST BP <80 MM HG: CPT | Performed by: FAMILY MEDICINE

## 2025-07-02 PROCEDURE — 3044F HG A1C LEVEL LT 7.0%: CPT | Performed by: FAMILY MEDICINE

## 2025-07-02 PROCEDURE — 1160F RVW MEDS BY RX/DR IN RCRD: CPT | Performed by: FAMILY MEDICINE

## 2025-07-02 RX ORDER — TIRZEPATIDE 2.5 MG/.5ML
INJECTION, SOLUTION SUBCUTANEOUS
COMMUNITY
End: 2025-07-02 | Stop reason: SDUPTHER

## 2025-07-02 RX ORDER — TRAZODONE HYDROCHLORIDE 100 MG/1
100 TABLET ORAL NIGHTLY
Qty: 30 TABLET | Refills: 2 | Status: SHIPPED | OUTPATIENT
Start: 2025-07-02

## 2025-07-02 RX ORDER — DIPHENOXYLATE HYDROCHLORIDE AND ATROPINE SULFATE 2.5; .025 MG/1; MG/1
1 TABLET ORAL 4 TIMES DAILY PRN
Qty: 20 TABLET | Refills: 2 | Status: SHIPPED | OUTPATIENT
Start: 2025-07-02

## 2025-07-02 RX ORDER — TIRZEPATIDE 2.5 MG/.5ML
2.5 INJECTION, SOLUTION SUBCUTANEOUS WEEKLY
Qty: 2 ML | Refills: 2 | Status: SHIPPED | OUTPATIENT
Start: 2025-07-02

## 2025-07-03 ENCOUNTER — TREATMENT (OUTPATIENT)
Dept: PHYSICAL THERAPY | Facility: CLINIC | Age: 70
End: 2025-07-03
Payer: MEDICARE

## 2025-07-03 DIAGNOSIS — M79.642 PAIN OF LEFT HAND: ICD-10-CM

## 2025-07-03 DIAGNOSIS — G56.02 CARPAL TUNNEL SYNDROME OF LEFT WRIST: Primary | ICD-10-CM

## 2025-07-03 DIAGNOSIS — M18.12 ARTHRITIS OF CARPOMETACARPAL (CMC) JOINT OF LEFT THUMB: ICD-10-CM

## 2025-07-03 PROCEDURE — 97110 THERAPEUTIC EXERCISES: CPT | Performed by: OCCUPATIONAL THERAPIST

## 2025-07-03 PROCEDURE — 97112 NEUROMUSCULAR REEDUCATION: CPT | Performed by: OCCUPATIONAL THERAPIST

## 2025-07-03 NOTE — PROGRESS NOTES
Occupational Therapy Daily Treatment Note   [address] [place] [id number]    Patient: Suyapa Barrera   : 1955  Referring practitioner: Madan Sandhu MD  Date of Initial Visit: Type: THERAPY  Noted: 2025  Today's Date: 7/3/2025.   Patient seen for 10 sessions    ICD-10-CM ICD-9-CM   1. Carpal tunnel syndrome of left wrist  G56.02 354.0   2. Pain of left hand  M79.642 729.5   3. Arthritis of carpometacarpal (CMC) joint of left thumb  M18.12 716.94          Suyapa Barrera reports I am feeling better. I am using my hand more      Functional status    Objective   See Exercise, Manual, and Modality Logs for complete treatment.     Right Wrist   Wrist flexion: 90 degrees   Wrist extension: 65 degrees   Radial deviation: 20 degrees   Ulnar deviation: 30 degrees     L thumb  0-35   0-45     ABD 55  Ext 60    Assessment/Plan  Pt needs to continue therapy for AROM, PROM, strengthening, pain mgmt, and functional re-ed.  Pt to continue therapy at another clinic d/c from this facility.  Pt has continued need for therapy.            Timed:  Manual Therapy:    0     mins  42843;  Therapeutic Exercise:    10     mins  37454;  Therapeutic Activity:    10     mins  26422;     Neuromuscular Liu:    10    mins  57805;    Ultrasound:     0     mins  80631;    Electrical Stimulation:    0     mins  33318;    Untimed:  Electrical Stimulation:    0     mins  24325 ( );  Fluidotherapy:        0    mins  51001  Dry Needlin    mins  08707    Timed Treatment:   30   mins   Total Treatment:     30   mins    OT SIGNATURE: JAYLON Bennett, OTR/L, CHT     Electronically signed    [place] LICENSE: [id number]

## 2025-07-08 PROBLEM — E66.811 CLASS 1 OBESITY DUE TO EXCESS CALORIES WITH SERIOUS COMORBIDITY AND BODY MASS INDEX (BMI) OF 30.0 TO 30.9 IN ADULT: Status: ACTIVE | Noted: 2022-08-03

## 2025-07-08 PROBLEM — F51.01 PRIMARY INSOMNIA: Status: ACTIVE | Noted: 2025-07-08

## 2025-07-08 PROBLEM — E66.09 CLASS 1 OBESITY DUE TO EXCESS CALORIES WITH SERIOUS COMORBIDITY AND BODY MASS INDEX (BMI) OF 30.0 TO 30.9 IN ADULT: Status: ACTIVE | Noted: 2022-08-03

## 2025-07-08 NOTE — PROGRESS NOTES
"Chief Complaint  Hospital Follow Up Visit (Dc'd 6/28/25/For unspecified infection. Pt reports feeling better today, some lingering diarrhea. )    Subjective          Suyapa Barrera presents to [facility]  History of Present Illness    History of Present Illness  The patient presents for evaluation of diarrhea and sleep issues.    She experienced a fainting episode during a hand therapy session, which was accompanied by vomiting and diarrhea. She was unable to recall her name or stand up, necessitating the intervention of EMS. She was subsequently admitted to the ICU where she received intravenous antibiotics every 4 hours. Her white blood cell count was elevated at 25,000, and her blood glucose level exceeded 300. Initially diagnosed with colitis, it was later suggested that her symptoms might be due to Mounjaro. This was her fourth such episode, with the first occurring in [place], resulting in a black eye. The other two episodes happened once a year while she was on Ozempic. This recent episode was the most severe, characterized by stomach gurgling, nausea, and sharp pains. She underwent 47 tests, including a CT scan. She continues to experience mild diarrhea and is seeking medication for it as she plans to travel to [place] tomorrow. She has been taking Pepto-Bismol without significant relief.    Her sleep has been disrupted due to nightmares, which have been particularly distressing, causing her to scream in her sleep. She has been prescribed Minipress and an increased dose of Effexor, but these have not improved her sleep quality.      Objective   Vital Signs:   /69 (BP Location: Left arm, Patient Position: Sitting, Cuff Size: Adult)   Pulse 99   Temp 97.7 °F (36.5 °C)   Resp 16   Ht 162.6 cm (64\")   Wt 79.8 kg (176 lb)   SpO2 100%   BMI 30.21 kg/m²       Physical Exam  Vitals reviewed.   Constitutional:       Appearance: Normal appearance. She is " well-developed.   HENT:      Head: Normocephalic and atraumatic.      Right Ear: External ear normal.      Left Ear: External ear normal.      Nose: Nose normal.   Eyes:      Conjunctiva/sclera: Conjunctivae normal.      Pupils: Pupils are equal, round, and reactive to light.   Cardiovascular:      Rate and Rhythm: Normal rate.   Pulmonary:      Effort: Pulmonary effort is normal.      Breath sounds: Normal breath sounds.   Abdominal:      General: There is no distension.   Skin:     General: Skin is warm and dry.   Neurological:      Mental Status: She is alert and oriented to person, place, and time. Mental status is at baseline.   Psychiatric:         Mood and Affect: Mood and affect normal.         Behavior: Behavior normal.         Thought Content: Thought content normal.         Judgment: Judgment normal.          Result Review :   The following data was reviewed by: Robin Cavazos DO on 07/02/2025:  Common labs          6/26/2025    13:25 6/26/2025    13:50 6/26/2025    15:56 6/27/2025    02:00 6/27/2025    02:41 6/28/2025    04:19   Common Labs   Glucose  310  167   151  84    BUN  23.1  25.1   21.5  13.1    Creatinine  1.52  1.37   1.08  0.91    Sodium  137  139   136  141    Potassium  3.6  4.1   4.2  4.1    Chloride  101  107   107  109    Calcium  9.0  8.3   8.0  8.5    Albumin  4.0        Total Bilirubin  1.4        Alkaline Phosphatase  95        AST (SGOT)  32        ALT (SGPT)  14        WBC 16.56    25.34   11.75    Hemoglobin 17.5    14.1   12.3    Hematocrit 53.8    40.0   37.4    Platelets 283    178   141    Hemoglobin A1C 5.60                Results  Labs   - White blood count: 25,000   - Blood sugar: Over 300                 Assessment and Plan    Diagnoses and all orders for this visit:    1. Primary hypertension (Primary)    2. Type 2 diabetes mellitus with hyperglycemia, without long-term current use of insulin  -     Tirzepatide (Mounjaro) 2.5 MG/0.5ML solution auto-injector; Inject 2.5 mg  under the skin into the appropriate area as directed 1 (One) Time Per Week.  Dispense: 2 mL; Refill: 2    3. PTSD (post-traumatic stress disorder)    4. Chronic diarrhea  -     diphenoxylate-atropine (Lomotil) 2.5-0.025 MG per tablet; Take 1 tablet by mouth 4 (Four) Times a Day As Needed for Diarrhea.  Dispense: 20 tablet; Refill: 2    5. Primary insomnia    6. Class 1 obesity due to excess calories with serious comorbidity and body mass index (BMI) of 30.0 to 30.9 in adult    Other orders  -     traZODone (DESYREL) 100 MG tablet; Take 1 tablet by mouth Every Night.  Dispense: 30 tablet; Refill: 2        Assessment & Plan  1. Diarrhea.  - Experienced a severe episode leading to hospitalization and ICU admission.  - Reports ongoing mild diarrhea.  - Requested a traveling diarrhea medication.  - Prescription for an antibiotic will be provided.    2. Sleep disturbances.  - Reports nightmares and poor sleep quality despite taking Minipress and an increased dose of Effexor.  - No improvement noted with current medications.  - Trazodone 100 mg will be prescribed to be taken one hour before bedtime.    3. Obesity  BMI 30, discussed restarting medicine as above and link to her presentation   Watch patient closely around June each year for flare ups or ties to condition and above    4. PTSD  Reviewed and discussed medicines, changed as above or refilled if appropriate  Otherwise stable        Follow Up   Return if symptoms worsen or fail to improve, for Next scheduled follow up, Recheck.    Patient was given instructions and counseling regarding her condition or for health maintenance advice. Please see specific information pulled into the AVS if appropriate.       Transcribed from ambient dictation for Robin Cavazos DO by Robin Cavazos DO.  07/08/25   06:32 EDT    Patient or patient representative verbalized consent for the use of Ambient Listening during the visit with  Robin Cavazos DO for chart documentation. 7/8/2025   06:34 EDT

## 2025-07-11 RX ORDER — LISINOPRIL 30 MG/1
30 TABLET ORAL EVERY OTHER DAY
Qty: 45 TABLET | Refills: 3 | Status: SHIPPED | OUTPATIENT
Start: 2025-07-11

## 2025-07-13 LAB
AFP INTERP SERPL-IMP: NORMAL
ALGORITHM: NORMAL
LOCATION: NORMAL
Lab: NORMAL
MUTATIONS ANALYZED: NORMAL
REGULATORY: NORMAL
RESULTS SUMMARY - SOURCE: NORMAL
RISK ASSESSMENT EXPLANATION: NORMAL
RISK CATEGORY*: NORMAL
SIGNATURE: NORMAL
THYGENEXT ONCOGENE PANEL: NORMAL
THYRAMIR(TM) MIRNA CLASSIFIER: NEGATIVE
THYRAMIR(TM) REFLEX STATUS: NORMAL

## 2025-07-14 ENCOUNTER — TELEPHONE (OUTPATIENT)
Dept: FAMILY MEDICINE CLINIC | Facility: CLINIC | Age: 70
End: 2025-07-14
Payer: MEDICARE

## 2025-07-14 ENCOUNTER — HOSPITAL ENCOUNTER (OUTPATIENT)
Dept: BONE DENSITY | Facility: HOSPITAL | Age: 70
Discharge: HOME | End: 2025-07-14
Admitting: FAMILY MEDICINE
Payer: MEDICARE

## 2025-07-14 DIAGNOSIS — R89.9 ABNORMAL THYROID BIOPSY: Primary | ICD-10-CM

## 2025-07-14 DIAGNOSIS — Z78.0 POSTMENOPAUSAL: ICD-10-CM

## 2025-07-14 PROCEDURE — 77080 DXA BONE DENSITY AXIAL: CPT

## 2025-07-14 NOTE — TELEPHONE ENCOUNTER
Called the patient with results of needle aspiration of thyroid, patient would like a referral to ENT for further evaluation.

## 2025-07-24 ENCOUNTER — OFFICE VISIT (OUTPATIENT)
Dept: FAMILY MEDICINE CLINIC | Facility: CLINIC | Age: 70
End: 2025-07-24
Payer: MEDICARE

## 2025-07-24 VITALS
TEMPERATURE: 98.4 F | HEIGHT: 64 IN | DIASTOLIC BLOOD PRESSURE: 77 MMHG | WEIGHT: 179 LBS | OXYGEN SATURATION: 97 % | RESPIRATION RATE: 17 BRPM | BODY MASS INDEX: 30.56 KG/M2 | SYSTOLIC BLOOD PRESSURE: 131 MMHG | HEART RATE: 82 BPM

## 2025-07-24 DIAGNOSIS — E11.65 TYPE 2 DIABETES MELLITUS WITH HYPERGLYCEMIA, WITHOUT LONG-TERM CURRENT USE OF INSULIN: ICD-10-CM

## 2025-07-24 DIAGNOSIS — K59.03 DRUG-INDUCED CONSTIPATION: ICD-10-CM

## 2025-07-24 DIAGNOSIS — F51.01 PRIMARY INSOMNIA: ICD-10-CM

## 2025-07-24 DIAGNOSIS — Z00.00 ENCOUNTER FOR SUBSEQUENT ANNUAL WELLNESS VISIT (AWV) IN MEDICARE PATIENT: Primary | ICD-10-CM

## 2025-07-24 DIAGNOSIS — I10 PRIMARY HYPERTENSION: ICD-10-CM

## 2025-07-24 PROCEDURE — 1170F FXNL STATUS ASSESSED: CPT | Performed by: FAMILY MEDICINE

## 2025-07-24 PROCEDURE — 3044F HG A1C LEVEL LT 7.0%: CPT | Performed by: FAMILY MEDICINE

## 2025-07-24 PROCEDURE — G0439 PPPS, SUBSEQ VISIT: HCPCS | Performed by: FAMILY MEDICINE

## 2025-07-24 PROCEDURE — 99213 OFFICE O/P EST LOW 20 MIN: CPT | Performed by: FAMILY MEDICINE

## 2025-07-24 PROCEDURE — 1159F MED LIST DOCD IN RCRD: CPT | Performed by: FAMILY MEDICINE

## 2025-07-24 PROCEDURE — 1160F RVW MEDS BY RX/DR IN RCRD: CPT | Performed by: FAMILY MEDICINE

## 2025-07-24 PROCEDURE — 3075F SYST BP GE 130 - 139MM HG: CPT | Performed by: FAMILY MEDICINE

## 2025-07-24 PROCEDURE — 1126F AMNT PAIN NOTED NONE PRSNT: CPT | Performed by: FAMILY MEDICINE

## 2025-07-24 PROCEDURE — 3078F DIAST BP <80 MM HG: CPT | Performed by: FAMILY MEDICINE

## 2025-07-24 NOTE — PROGRESS NOTES
Subjective   The ABCs of the Annual Wellness Visit  Medicare Wellness Visit      Suyapa Barrera is a 70 y.o. patient who presents for a Medicare Wellness Visit.    The following portions of the patient's history were reviewed and   updated as appropriate: allergies, current medications, past family history, past medical history, past social history, past surgical history, and problem list.    Compared to one year ago, the patient's physical   health is the same.  Compared to one year ago, the patient's mental   health is the same.    Recent Hospitalizations:  This patient has had a [facility] [place] by [facility] admission record on file within the last 365 days.  Current Medical Providers:  Patient Care Team:  Robin Cavazos DO as PCP - General (Family Medicine)  Amy Armstrong MD as Consulting Physician (Pulmonary Disease)    Outpatient Medications Prior to Visit   Medication Sig Dispense Refill    diphenoxylate-atropine (Lomotil) 2.5-0.025 MG per tablet Take 1 tablet by mouth 4 (Four) Times a Day As Needed for Diarrhea. 20 tablet 2    lisinopril (PRINIVIL,ZESTRIL) 30 MG tablet TAKE 1 TABLET EVERY OTHER DAY 45 tablet 3    omeprazole (priLOSEC) 40 MG capsule Take 1 capsule by mouth Daily. 90 capsule 3    ondansetron ODT (ZOFRAN-ODT) 8 MG disintegrating tablet Place 1 tablet on the tongue Every 8 (Eight) Hours As Needed for Nausea or Vomiting. 30 tablet 2    simvastatin (ZOCOR) 40 MG tablet Take 1 tablet by mouth Every Night. 90 tablet 3    Tirzepatide (Mounjaro) 2.5 MG/0.5ML solution auto-injector Inject 2.5 mg under the skin into the appropriate area as directed 1 (One) Time Per Week. 2 mL 2    traZODone (DESYREL) 100 MG tablet Take 1 tablet by mouth Every Night. 30 tablet 2    venlafaxine XR (EFFEXOR-XR) 150 MG 24 hr capsule Take 2 capsules by mouth Daily. 180 capsule 3     No facility-administered medications prior to visit.     No opioid medication identified on active medication list. I have reviewed chart for other  "potential  high risk medication/s and harmful drug interactions in the elderly.      Aspirin is not on active medication list.  Aspirin use is not indicated based on review of current medical condition/s. Risk of harm outweighs potential benefits.  .    Patient Active Problem List   Diagnosis    Depression with anxiety    Chronic pain of left wrist    Degenerative disc disease, lumbar    Esophageal reflux    HLD (hyperlipidemia)    HTN (hypertension)    Type 2 diabetes mellitus with hyperglycemia, without long-term current use of insulin    Class 1 obesity due to excess calories with serious comorbidity and body mass index (BMI) of 30.0 to 30.9 in adult    Obstructive sleep apnea, adult    Panic attacks    Vitamin D deficiency    Bicipital tenosynovitis    Full thickness rotator cuff tear    Overweight (BMI 25.0-29.9)    Positive colorectal cancer screening using Cologuard test    PTSD (post-traumatic stress disorder)    Cervicalgia    Acute cystitis without hematuria    Chronic diarrhea    Medication intolerance    Nausea and vomiting    Primary insomnia     Advance Care Planning Advance Directive is not on file.  ACP discussion was declined by the patient. Patient has an advance directive (not in EMR), copy requested.            Objective   Vitals:    07/24/25 0742   BP: 131/77   Pulse: 82   Resp: 17   Temp: 98.4 °F (36.9 °C)   TempSrc: Temporal   SpO2: 97%   Weight: 81.2 kg (179 lb)   Height: 162.6 cm (64.02\")   PainSc: 0-No pain       Estimated body mass index is 30.71 kg/m² as calculated from the following:    Height as of this encounter: 162.6 cm (64.02\").    Weight as of this encounter: 81.2 kg (179 lb).                Does the patient have evidence of cognitive impairment? No  Lab Results   Component Value Date    TRIG 56 06/05/2025    HDL 72 (H) 06/05/2025     (H) 06/05/2025    VLDL 11 06/05/2025    HGBA1C 5.60 06/26/2025                                                                                 "                Health  Risk Assessment    Smoking Status:  Social History     Tobacco Use   Smoking Status Never    Passive exposure: Never   Smokeless Tobacco Never     Alcohol Consumption:  Social History     Substance and Sexual Activity   Alcohol Use Yes    Alcohol/week: 1.0 standard drink of alcohol    Types: 1 Glasses of wine per week    Comment: Maybe one drink a month       Fall Risk Screen  MONTSERRAT Fall Risk Assessment was completed, and patient is at LOW risk for falls.Assessment completed on:2025    Depression Screening   Little interest or pleasure in doing things? Not at all   Feeling down, depressed, or hopeless? Several days   PHQ-2 Total Score 1      Health Habits and Functional and Cognitive Screenin/24/2025     7:00 AM   Functional & Cognitive Status   Do you have difficulty preparing food and eating? No   Do you have difficulty bathing yourself, getting dressed or grooming yourself? No   Do you have difficulty using the toilet? No   Do you have difficulty moving around from place to place? No   Do you have trouble with steps or getting out of a bed or a chair? No   Current Diet Well Balanced Diet   Dental Exam Up to date   Eye Exam Up to date   Exercise (times per week) 0 times per week   Current Exercises Include No Regular Exercise   Do you need help using the phone?  No   Are you deaf or do you have serious difficulty hearing?  No   Do you need help to go to places out of walking distance? No   Do you need help shopping? No   Do you need help preparing meals?  No   Do you need help with housework?  No   Do you need help with laundry? No   Do you need help taking your medications? No   Do you need help managing money? No   Do you ever drive or ride in a car without wearing a seat belt? No   Have you felt unusual fatigue (could be tiredness), stress, anger or loneliness in the last month? Yes   Who do you live with? Alone   If you need help, do you have trouble finding someone  available to you? No   Have you been bothered in the last four weeks by sexual problems? No   Do you have difficulty concentrating, remembering or making decisions? Yes           Age-appropriate Screening Schedule:  Refer to the list below for future screening recommendations based on patient's age, sex and/or medical conditions. Orders for these recommended tests are listed in the plan section. The patient has been provided with a written plan.    Health Maintenance List  Health Maintenance   Topic Date Due    TDAP/TD VACCINES (1 - Tdap) Never done    COVID-19 Vaccine (2 - 2024-25 season) 09/01/2024    COLORECTAL CANCER SCREENING  09/03/2025    INFLUENZA VACCINE  10/01/2025    HEMOGLOBIN A1C  12/26/2025    LIPID PANEL  06/05/2026    URINE MICROALBUMIN-CREATININE RATIO (uACR)  06/05/2026    ANNUAL WELLNESS VISIT  07/24/2026    MAMMOGRAM  12/24/2026    DXA SCAN  07/14/2027    HEPATITIS C SCREENING  Completed    Pneumococcal Vaccine 50+  Completed    ZOSTER VACCINE  Completed    DIABETIC FOOT EXAM  Discontinued    DIABETIC EYE EXAM  Discontinued                                                                                                                                                CMS Preventative Services Quick Reference  Risk Factors Identified During Encounter  reviewed    The above risks/problems have been discussed with the patient.  Pertinent information has been shared with the patient in the After Visit Summary.  An After Visit Summary and PPPS were made available to the patient.    Follow Up:   Next Medicare Wellness visit to be scheduled in 1 year.          Additional E&M Note during same encounter follows:  Patient has multiple medical problems which are significant and separately identifiable that require additional work above and beyond the Medicare Wellness Visit.      Chief Complaint  Insomnia (Pt is being seen for a follow up on medication she states that she is sleeping all through the night but  "is unsure if she is having nightmares. ) and Medicare Wellness-subsequent    Suyapa Barrera is a 70 y.o. female who presents to [facility]     History of Present Illness  The patient is here for a 6-week follow-up and subsequent Medicare wellness visit, following up on changes to her medication. She was having issues with nightmares and was previously on prazosin, which was changed to trazodone at the last appointment to see if this improves. This change seems to be working as she is now sleeping through the night. She also experienced some diarrhea during her last visit and requested medication for potential future episodes, especially while traveling. She is up to date on vaccinations and cancer screenings. However, she needs better control of her diabetes, with a goal A1c of less than 7. Her diabetes exam is current.    Objective   Vital Signs:   Vitals:    07/24/25 0742   BP: 131/77   Pulse: 82   Resp: 17   Temp: 98.4 °F (36.9 °C)   TempSrc: Temporal   SpO2: 97%   Weight: 81.2 kg (179 lb)   Height: 162.6 cm (64.02\")   PainSc: 0-No pain       Wt Readings from Last 3 Encounters:   07/24/25 81.2 kg (179 lb)   07/02/25 79.8 kg (176 lb)   06/28/25 80.7 kg (177 lb 14.6 oz)     BP Readings from Last 3 Encounters:   07/24/25 131/77   07/02/25 121/69   06/28/25 137/68       Physical Exam  Vitals reviewed.   Constitutional:       Appearance: Normal appearance. She is well-developed.   HENT:      Head: Normocephalic and atraumatic.      Right Ear: External ear normal.      Left Ear: External ear normal.      Nose: Nose normal.   Eyes:      Conjunctiva/sclera: Conjunctivae normal.      Pupils: Pupils are equal, round, and reactive to light.   Cardiovascular:      Rate and Rhythm: Normal rate.   Pulmonary:      Effort: Pulmonary effort is normal.      Breath sounds: Normal breath sounds.   Abdominal:      General: There is no distension.   Skin:     General: Skin is warm and dry. "   Neurological:      Mental Status: She is alert and oriented to person, place, and time. Mental status is at baseline.   Psychiatric:         Mood and Affect: Mood and affect normal.         Behavior: Behavior normal.         Thought Content: Thought content normal.         Judgment: Judgment normal.         Physical Exam      The following data was reviewed by Robin Cavazos DO on 07/24/2025  Common Labs   Common labs          6/26/2025    13:25 6/26/2025    13:50 6/26/2025    15:56 6/27/2025    02:00 6/27/2025    02:41 6/28/2025    04:19   Common Labs   Glucose  310  167   151  84    BUN  23.1  25.1   21.5  13.1    Creatinine  1.52  1.37   1.08  0.91    Sodium  137  139   136  141    Potassium  3.6  4.1   4.2  4.1    Chloride  101  107   107  109    Calcium  9.0  8.3   8.0  8.5    Albumin  4.0        Total Bilirubin  1.4        Alkaline Phosphatase  95        AST (SGOT)  32        ALT (SGPT)  14        WBC 16.56    25.34   11.75    Hemoglobin 17.5    14.1   12.3    Hematocrit 53.8    40.0   37.4    Platelets 283    178   141    Hemoglobin A1C 5.60             Results          Assessment & Plan   Diagnoses and all orders for this visit:    1. Encounter for subsequent annual wellness visit (AWV) in Medicare patient (Primary)    2. Primary hypertension    3. Type 2 diabetes mellitus with hyperglycemia, without long-term current use of insulin    4. Primary insomnia    5. Drug-induced constipation  -     linaclotide (Linzess) 72 MCG capsule capsule; Take 1 capsule by mouth Every Morning Before Breakfast.  Dispense: 30 capsule; Refill: 2        Assessment & Plan       AWV    Reviewed AWV recommendations and ordered as appropriate, follow up annually for review, sooner if concerns    No depression, falls, dementia symptoms or other  Risk and home safety assessment good    Followed up on chronic conditions and ordered refills, appropriate monitoring labs additionally as needed every 6 months or sooner if indicated,  controlled stable    Follow up at least every 3-6 months for chronic conditions and as scheduled with appropriate specialists as indicated otherwise     2. Insomnia  Improving with trazodone continue as prescribed    3.  Constipation  Prescribe Linzess if no improvement consider just taking combination with MiraLAX stool softeners and other bowel medication regimen medicines       FOLLOW UP  Return in about 6 months (around 1/24/2026), or if symptoms worsen or fail to improve, for Next scheduled follow up, Recheck.  Patient was given instructions and counseling regarding her condition or for health maintenance advice. Please see specific information pulled into the AVS if appropriate.     Patient or patient representative verbalized consent for the use of Ambient Listening during the visit with  Robin Cavazos DO for chart documentation. 7/24/2025  07:47 EDT    Robin Cavazos DO  07/24/25  07:59 EDT

## 2025-07-31 ENCOUNTER — TELEPHONE (OUTPATIENT)
Dept: FAMILY MEDICINE CLINIC | Facility: CLINIC | Age: 70
End: 2025-07-31

## 2025-08-04 ENCOUNTER — CLINICAL SUPPORT (OUTPATIENT)
Dept: GASTROENTEROLOGY | Facility: CLINIC | Age: 70
End: 2025-08-04
Payer: MEDICARE

## 2025-08-04 DIAGNOSIS — D12.6 TUBULAR ADENOMA OF COLON: Primary | ICD-10-CM

## 2025-08-04 RX ORDER — PEG-3350, SODIUM SULFATE, SODIUM CHLORIDE, POTASSIUM CHLORIDE, SODIUM ASCORBATE AND ASCORBIC ACID 7.5-2.691G
1000 KIT ORAL EVERY 12 HOURS
Qty: 2000 ML | Refills: 0 | Status: SHIPPED | OUTPATIENT
Start: 2025-08-04

## 2025-08-25 ENCOUNTER — OFFICE VISIT (OUTPATIENT)
Dept: FAMILY MEDICINE CLINIC | Facility: CLINIC | Age: 70
End: 2025-08-25
Payer: MEDICARE

## 2025-08-25 VITALS
OXYGEN SATURATION: 99 % | BODY MASS INDEX: 30.9 KG/M2 | HEART RATE: 82 BPM | DIASTOLIC BLOOD PRESSURE: 77 MMHG | SYSTOLIC BLOOD PRESSURE: 152 MMHG | TEMPERATURE: 98.6 F | HEIGHT: 64 IN | WEIGHT: 181 LBS

## 2025-08-25 DIAGNOSIS — N81.9 FEMALE GENITAL PROLAPSE, UNSPECIFIED TYPE: Primary | ICD-10-CM

## 2025-08-25 DIAGNOSIS — E66.09 CLASS 1 OBESITY DUE TO EXCESS CALORIES WITH SERIOUS COMORBIDITY AND BODY MASS INDEX (BMI) OF 30.0 TO 30.9 IN ADULT: ICD-10-CM

## 2025-08-25 DIAGNOSIS — E66.811 CLASS 1 OBESITY DUE TO EXCESS CALORIES WITH SERIOUS COMORBIDITY AND BODY MASS INDEX (BMI) OF 30.0 TO 30.9 IN ADULT: ICD-10-CM

## 2025-08-25 PROCEDURE — 1126F AMNT PAIN NOTED NONE PRSNT: CPT

## 2025-08-25 PROCEDURE — 3044F HG A1C LEVEL LT 7.0%: CPT

## 2025-08-25 PROCEDURE — 99213 OFFICE O/P EST LOW 20 MIN: CPT

## 2025-08-25 PROCEDURE — 3077F SYST BP >= 140 MM HG: CPT

## 2025-08-25 PROCEDURE — 3078F DIAST BP <80 MM HG: CPT

## (undated) DEVICE — SUREFIT, DUAL DISPERSIVE ELECTRODE, CONTACT QUALITY MONITOR: Brand: SUREFIT

## (undated) DEVICE — INJ SUB/MUCUS ELEVIEW FOR/GI/ENDO/PROC AMPL/10ML

## (undated) DEVICE — Device: Brand: DEFENDO AIR/WATER/SUCTION AND BIOPSY VALVE

## (undated) DEVICE — LINER SURG CANSTR SXN S/RIGD 1500CC

## (undated) DEVICE — Device: Brand: SINGLE USE INJECTOR NM600/610

## (undated) DEVICE — SOLIDIFIER LIQLOC PLS 1500CC BT

## (undated) DEVICE — CONN JET HYDRA H20 AUXILIARY DISP

## (undated) DEVICE — THE SINGLE USE ETRAP – POLYP TRAP IS USED FOR SUCTION RETRIEVAL OF ENDOSCOPICALLY REMOVED POLYPS.: Brand: ETRAP

## (undated) DEVICE — SOL IRRG H2O PL/BG 1000ML STRL

## (undated) DEVICE — Device

## (undated) DEVICE — SNAR POLYP CAPTIFLEX XS/OVL 11X2.4MM 240CM 1P/U